# Patient Record
Sex: FEMALE | Race: WHITE | Employment: OTHER | URBAN - METROPOLITAN AREA
[De-identification: names, ages, dates, MRNs, and addresses within clinical notes are randomized per-mention and may not be internally consistent; named-entity substitution may affect disease eponyms.]

---

## 2022-03-22 LAB
INTERNAL QC: NORMAL
SARS-COV-2, NAA: NEGATIVE

## 2022-04-18 ENCOUNTER — TELEPHONE (OUTPATIENT)
Dept: SURGERY | Age: 62
End: 2022-04-18

## 2022-04-18 DIAGNOSIS — R19.01 RUQ ABDOMINAL MASS: Primary | ICD-10-CM

## 2022-04-18 NOTE — TELEPHONE ENCOUNTER
Tell her GBUS looked ok overall. The ultrasound raised question of a lump next to her liver and a CT is needed to further evaluate. I placed this order in Epic. She can get this at [facility] and then follow up in clinic after it has been completed.

## 2022-04-19 DIAGNOSIS — R19.01 RUQ ABDOMINAL MASS: Primary | ICD-10-CM

## 2022-04-19 NOTE — TELEPHONE ENCOUNTER
Authorization is for CT abdomen only. The # is [id number]. It is good until May 18, 2022 at [facility].

## 2022-04-19 NOTE — TELEPHONE ENCOUNTER
I called patient and informed her. I called her insurance company to get prior auth, but they are  [place] time zone and I can not reach them until after 11:00 am, so I will call them back then.

## 2022-04-21 ENCOUNTER — TELEPHONE (OUTPATIENT)
Dept: SURGERY | Age: 62
End: 2022-04-21

## 2022-04-21 DIAGNOSIS — R19.01 RUQ ABDOMINAL MASS: ICD-10-CM

## 2022-04-21 DIAGNOSIS — K63.89 COLONIC MASS: ICD-10-CM

## 2022-04-21 DIAGNOSIS — R10.11 RUQ ABDOMINAL PAIN: Primary | ICD-10-CM

## 2022-04-21 LAB
CREAT SERPL-MCNC: 0.8 MG/DL (ref 0.52–1.04)
GFR CALCULATED: 72

## 2022-04-22 NOTE — TELEPHONE ENCOUNTER
I called patient to explain that CT abdomen shows abnormal colon in RUQ. It was incompletely visualized because her insurance would only approve CT abdomen despite peer to peer that I performed to try and get CT abdomen and pelvis. The patient now needs CT abdomen and pelvis for critical diagnosis that was incompletely visualized. Order placed. I explained the previous insurance denial, our attempt to avoid this very situation and her insurance refusal to approve the appropriate study. Please call her and set up stat CT abdomen and pelvis. Further recommendations once this CT is completed.

## 2022-04-25 ENCOUNTER — TELEPHONE (OUTPATIENT)
Dept: SURGERY | Age: 62
End: 2022-04-25

## 2022-04-25 NOTE — TELEPHONE ENCOUNTER
I spoke with patient. I sent all info to [facility], and still waiting to hear about the prior auth. I will call her as soon as I hear back from [facility].

## 2022-04-25 NOTE — TELEPHONE ENCOUNTER
Pt called in checking the status of her CT scan approval, her cell # [phone], or call her at home. Thanks.

## 2022-04-26 NOTE — TELEPHONE ENCOUNTER
I called [employer] and spoke to Jagruti Chua. She states they denied and closed this request for CT Abd and Pelvis. CPT code 59020. She thinks they might have closed it because they thought it was a duplicate, since she just had one done on 4/19/22. I explained to her that on 4/22/22, I called in to get this new test authorized, and spoke to Jenny Johnston, who told me to fax info to them. I also told her no one called me back or faxed a denial letter. She states it shows that they mailed it to us, so we have not received it yet. She said a Peer to Peer can be done by calling [phone], Ref# [id number]. Patient's [employer] ID # is [id number].

## 2022-04-26 NOTE — TELEPHONE ENCOUNTER
I spoke with peer reviewer. CT abdomen / pelvis is approved. She stated she needed to get new authorization number and would send that this afternoon.

## 2022-04-28 ENCOUNTER — TELEPHONE (OUTPATIENT)
Dept: SURGERY | Age: 62
End: 2022-04-28

## 2022-04-30 NOTE — TELEPHONE ENCOUNTER
I spoke with the patient 4/29/2022 and gave CT results and made referral to Dr. David Ariza. I spoke with him about the case, and his office will contact for her to get colonoscopy ASAP.

## 2022-05-04 ENCOUNTER — TELEPHONE (OUTPATIENT)
Dept: SURGERY | Age: 62
End: 2022-05-04

## 2022-05-04 NOTE — TELEPHONE ENCOUNTER
Ask her to come to [facility] clinic tomorrow to discuss colonoscopy and recent testing and formulate a plan.

## 2022-05-06 ENCOUNTER — ANESTHESIA EVENT (OUTPATIENT)
Dept: OPERATING ROOM | Age: 62
DRG: 326 | End: 2022-05-06
Payer: COMMERCIAL

## 2022-05-06 PROBLEM — C18.9 COLON CANCER (HCC): Status: ACTIVE | Noted: 2022-05-06

## 2022-05-06 RX ORDER — DILTIAZEM HYDROCHLORIDE 180 MG/1
180 CAPSULE, COATED, EXTENDED RELEASE ORAL NIGHTLY
COMMUNITY
End: 2022-09-01

## 2022-05-06 NOTE — PROGRESS NOTES
PRE OP INSTRUCTION SHEET   1. Do not eat or drink anything after 12 midnight  prior to surgery. This includes no water, chewing gum or mints. 2. Take the following pills will a small sip of water (see MAR)                                        3. Aspirin, Ibuprofen, Advil, Naproxen, Vitamin E, fish oil and other Anti-inflammatory products should be stopped for 5 days before surgery or as directed by your physician. 4. Check with your Doctor regarding stopping Plavix, Coumadin, Lovenox, Fragmin or other blood thinners   5. Do not smoke, and do not drink any alcoholic beverages 24 hours prior to surgery. This includes NA Beer. 6. You may brush your teeth and gargle the morning of surgery. DO NOT SWALLOW WATER   7. You MUST make arrangements for a responsible adult to take you home after your surgery. You will not be allowed to leave alone or drive yourself home. It is strongly suggested someone stay with you the first 24 hrs. Your surgery will be cancelled if you do not have a ride home. 8. A parent/legal guardian must accompany a child scheduled for surgery and plan to stay at the hospital until the child is discharged. Please do not bring other children with you. 9. Please wear simple, loose fitting clothing to the hospital.  Emily Yousifs not bring valuables (money, credit cards, checkbooks, etc.) Do not wear any makeup (including no eye makeup) or nail polish on your fingers or toes. 10. DO NOT wear any jewelry or piercings on day of surgery. All body piercing jewelry must be removed. 11. If you have dentures,glasses, or contacts they will be removed before going to the OR; we will provide you a container. 12. Please see your family doctor/and cardiologist for a history & physical and/or concerning medications. Bring any test results/reports from your physician's office. Have history and labs faxed to [phone].  Remember to bring Blood Bank bracelet on the day of surgery. 14. If you have a Living Will and Durable Power of  for Healthcare, please bring in a copy. 13. Notify your Surgeon if you develop any illness between now and surgery  time, cough, cold, fever, sore throat, nausea, vomiting, etc.  Please notify your surgeon if you experience dizziness, shortness of breath or blurred vision between now & the time of your surgery   16. DO NOT shave your operative site 96 hours prior to surgery. For face & neck surgery, men may use an electric razor 48 hours prior to surgery. 17. Shower with _x__Antibacterial soap (x_chlorhexidine for total joint  Pt's) shower two times before surgery.(the morning of and the night before. 18. To provide excellent care visitors will be limited to one in the room at any given time.   Please call pre admission testing if you any further questions [phone] or [phone]

## 2022-05-09 NOTE — ANESTHESIA PRE PROCEDURE
Department of Anesthesiology  Preprocedure Note       Name:  Patty Caraballo   Age:  59 y.o.  :  1960                                          MRN:  [id number]         Date:  2022      Surgeon: Harry Baird):  Veronique Aguirre MD    Procedure: Procedure(s):  RIGHT COLECTOMY    Medications prior to admission:   Prior to Admission medications    Medication Sig Start Date End Date Taking? Authorizing Provider   ESTRADIOL ACETATE PO Take 1 tablet by mouth at bedtime   Yes Historical Provider, MD   dilTIAZem (CARDIZEM CD) 180 MG extended release capsule Take 180 mg by mouth at bedtime   Yes Historical Provider, MD       Current medications:    No current facility-administered medications for this encounter. Current Outpatient Medications   Medication Sig Dispense Refill    ESTRADIOL ACETATE PO Take 1 tablet by mouth at bedtime      dilTIAZem (CARDIZEM CD) 180 MG extended release capsule Take 180 mg by mouth at bedtime         Allergies:     Allergies   Allergen Reactions    Codeine     Penicillins     Sulfa Antibiotics        Problem List:    Patient Active Problem List   Diagnosis Code    Colon cancer ([facility] C18.9       Past Medical History:        Diagnosis Date    [facility]     Hypertension        Past Surgical History:        Procedure Laterality Date    HERNIA REPAIR      TONSILLECTOMY         Social History:    Social History     Tobacco Use    Smoking status: Current Every Day Smoker    Smokeless tobacco: Never Used   Substance Use Topics    Alcohol use: Yes     Comment: seldom                                Ready to quit: Not Answered  Counseling given: Not Answered      Vital Signs (Current):   Vitals:    22 1333   Weight: 97 lb (44 kg)   Height: 5' 3\" (1.6 m)                                              BP Readings from Last 3 Encounters:   No data found for BP       NPO Status:                                                                                 BMI:   Wt Readings from Last 3 Encounters:   05/06/22 97 lb (44 kg)     Body mass index is 17.18 kg/m². CBC: No results found for: WBC, RBC, HGB, HCT, MCV, RDW, PLT    CMP:   Lab Results   Component Value Date    CREATININE 0.8 04/21/2022    LABGLOM 72 04/21/2022       POC Tests: No results for input(s): POCGLU, POCNA, POCK, POCCL, POCBUN, POCHEMO, POCHCT in the last 72 hours. Coags: No results found for: PROTIME, INR, APTT    HCG (If Applicable): No results found for: PREGTESTUR, PREGSERUM, HCG, HCGQUANT     ABGs: No results found for: PHART, PO2ART, RAM7OTJ, VRP0PYO, BEART, I5LPZWLH     Type & Screen (If Applicable):  No results found for: LABABO, LABRH    Drug/Infectious Status (If Applicable):  No results found for: HIV, HEPCAB    COVID-19 Screening (If Applicable):   Lab Results   Component Value Date    COVID19 NEGATIVE 03/22/2022           Anesthesia Evaluation  Patient summary reviewed and Nursing notes reviewed no history of anesthetic complications:   Airway: Mallampati: II     Neck ROM: full   Dental:          Pulmonary:Negative Pulmonary ROS and normal exam                               Cardiovascular:Negative CV ROS    (+) hypertension:,                   Neuro/Psych:   Negative Neuro/Psych ROS              GI/Hepatic/Renal: Neg GI/Hepatic/Renal ROS       (-) hiatal hernia and GERD       Endo/Other: Negative Endo/Other ROS                    Abdominal:             Vascular: Other Findings:             Anesthesia Plan      general     ASA 2     (I discussed with the patient the risks and benefits of PIV, general anesthesia, IV Narcotics, PACU. All questions were answered the patient agrees with the plan and wishes to proceed.  )  Induction: intravenous. Pre-Operative Diagnosis: COLON CANCER    59 y.o.   BMI:  Body mass index is 17.36 kg/m².      Vitals:    05/06/22 1333 05/10/22 0645   BP:  137/70   Pulse:  101   Resp:  16   Temp:  97.3 °F (36.3 °C)   TempSrc:  Temporal   SpO2:  95% Weight: 97 lb (44 kg) 98 lb (44.5 kg)   Height: 5' 3\" (1.6 m) 5' 3\" (1.6 m)       Allergies   Allergen Reactions    Codeine     Penicillins     Sulfa Antibiotics        Social History     Tobacco Use    Smoking status: Current Every Day Smoker     Packs/day: 1.00     Years: 47.00     Pack years: 47.00    Smokeless tobacco: Never Used   Substance Use Topics    Alcohol use: Yes     Comment: seldom       LABS:    CBC  No results found for: WBC, HGB, HCT, PLT  RENAL  Lab Results   Component Value Date/Time    CREATININE 0.8 04/21/2022 06:56 AM     COAGS  No results found for: PROTIME, INR, APTT      Radha Veliz MD   5/9/2022

## 2022-05-10 ENCOUNTER — ANESTHESIA (OUTPATIENT)
Dept: OPERATING ROOM | Age: 62
DRG: 326 | End: 2022-05-10
Payer: COMMERCIAL

## 2022-05-10 ENCOUNTER — HOSPITAL ENCOUNTER (INPATIENT)
Age: 62
LOS: 10 days | Discharge: HOME | DRG: 326 | End: 2022-05-20
Attending: SURGERY | Admitting: SURGERY
Payer: COMMERCIAL

## 2022-05-10 VITALS
SYSTOLIC BLOOD PRESSURE: 128 MMHG | DIASTOLIC BLOOD PRESSURE: 58 MMHG | TEMPERATURE: 97 F | RESPIRATION RATE: 13 BRPM | OXYGEN SATURATION: 100 %

## 2022-05-10 DIAGNOSIS — C18.2 MALIGNANT NEOPLASM OF ASCENDING COLON (HCC): Primary | ICD-10-CM

## 2022-05-10 LAB
ABO/RH: NORMAL
ANION GAP SERPL CALCULATED.3IONS-SCNC: 11 MMOL/L (ref 3–16)
ANTIBODY SCREEN: NORMAL
BUN BLDV-MCNC: 19 MG/DL (ref 7–20)
CALCIUM SERPL-MCNC: 8.7 MG/DL (ref 8.3–10.6)
CHLORIDE BLD-SCNC: 104 MMOL/L (ref 99–110)
CO2: 22 MMOL/L (ref 21–32)
CREAT SERPL-MCNC: 0.7 MG/DL (ref 0.6–1.2)
EKG ATRIAL RATE: 94 BPM
EKG DIAGNOSIS: NORMAL
EKG P AXIS: 85 DEGREES
EKG P-R INTERVAL: 136 MS
EKG Q-T INTERVAL: 370 MS
EKG QRS DURATION: 76 MS
EKG QTC CALCULATION (BAZETT): 462 MS
EKG R AXIS: -62 DEGREES
EKG T AXIS: 74 DEGREES
EKG VENTRICULAR RATE: 94 BPM
GFR AFRICAN AMERICAN: >60
GFR NON-AFRICAN AMERICAN: >60
GLUCOSE BLD-MCNC: 101 MG/DL (ref 70–99)
GLUCOSE BLD-MCNC: 112 MG/DL (ref 70–99)
GLUCOSE BLD-MCNC: 118 MG/DL (ref 70–99)
GLUCOSE BLD-MCNC: 89 MG/DL (ref 70–99)
HCT VFR BLD CALC: 30.3 % (ref 36–48)
HEMOGLOBIN: 9.9 G/DL (ref 12–16)
MCH RBC QN AUTO: 27.6 PG (ref 26–34)
MCHC RBC AUTO-ENTMCNC: 32.6 G/DL (ref 31–36)
MCV RBC AUTO: 84.8 FL (ref 80–100)
PDW BLD-RTO: 15.5 % (ref 12.4–15.4)
PERFORMED ON: ABNORMAL
PLATELET # BLD: 317 K/UL (ref 135–450)
PMV BLD AUTO: 6.9 FL (ref 5–10.5)
POTASSIUM REFLEX MAGNESIUM: 4 MMOL/L (ref 3.5–5.1)
RBC # BLD: 3.57 M/UL (ref 4–5.2)
SODIUM BLD-SCNC: 137 MMOL/L (ref 136–145)
WBC # BLD: 9.7 K/UL (ref 4–11)

## 2022-05-10 PROCEDURE — 6360000002 HC RX W HCPCS

## 2022-05-10 PROCEDURE — 94761 N-INVAS EAR/PLS OXIMETRY MLT: CPT

## 2022-05-10 PROCEDURE — 3700000000 HC ANESTHESIA ATTENDED CARE: Performed by: SURGERY

## 2022-05-10 PROCEDURE — 3600000004 HC SURGERY LEVEL 4 BASE: Performed by: SURGERY

## 2022-05-10 PROCEDURE — 86850 RBC ANTIBODY SCREEN: CPT

## 2022-05-10 PROCEDURE — 2500000003 HC RX 250 WO HCPCS

## 2022-05-10 PROCEDURE — 80048 BASIC METABOLIC PNL TOTAL CA: CPT

## 2022-05-10 PROCEDURE — 6360000002 HC RX W HCPCS: Performed by: SURGERY

## 2022-05-10 PROCEDURE — 3700000001 HC ADD 15 MINUTES (ANESTHESIA): Performed by: SURGERY

## 2022-05-10 PROCEDURE — 44160 REMOVAL OF COLON: CPT | Performed by: SURGERY

## 2022-05-10 PROCEDURE — 7100000000 HC PACU RECOVERY - FIRST 15 MIN: Performed by: SURGERY

## 2022-05-10 PROCEDURE — 88342 IMHCHEM/IMCYTCHM 1ST ANTB: CPT

## 2022-05-10 PROCEDURE — 44015 INSERT NEEDLE CATH BOWEL: CPT | Performed by: SURGERY

## 2022-05-10 PROCEDURE — 86900 BLOOD TYPING SEROLOGIC ABO: CPT

## 2022-05-10 PROCEDURE — 88313 SPECIAL STAINS GROUP 2: CPT

## 2022-05-10 PROCEDURE — 93010 ELECTROCARDIOGRAM REPORT: CPT | Performed by: INTERNAL MEDICINE

## 2022-05-10 PROCEDURE — 44120 REMOVAL OF SMALL INTESTINE: CPT | Performed by: SURGERY

## 2022-05-10 PROCEDURE — 2720000010 HC SURG SUPPLY STERILE: Performed by: SURGERY

## 2022-05-10 PROCEDURE — 7100000001 HC PACU RECOVERY - ADDTL 15 MIN: Performed by: SURGERY

## 2022-05-10 PROCEDURE — 86901 BLOOD TYPING SEROLOGIC RH(D): CPT

## 2022-05-10 PROCEDURE — 43762 RPLC GTUBE NO REVJ TRC: CPT

## 2022-05-10 PROCEDURE — 2500000003 HC RX 250 WO HCPCS: Performed by: SURGERY

## 2022-05-10 PROCEDURE — 47600 CHOLECYSTECTOMY: CPT | Performed by: SURGERY

## 2022-05-10 PROCEDURE — 2580000003 HC RX 258

## 2022-05-10 PROCEDURE — 88309 TISSUE EXAM BY PATHOLOGIST: CPT

## 2022-05-10 PROCEDURE — 94150 VITAL CAPACITY TEST: CPT

## 2022-05-10 PROCEDURE — 2500000003 HC RX 250 WO HCPCS: Performed by: ANESTHESIOLOGY

## 2022-05-10 PROCEDURE — 2709999900 HC NON-CHARGEABLE SUPPLY: Performed by: SURGERY

## 2022-05-10 PROCEDURE — C1892 INTRO/SHEATH,FIXED,PEEL-AWAY: HCPCS | Performed by: SURGERY

## 2022-05-10 PROCEDURE — 2580000003 HC RX 258: Performed by: ANESTHESIOLOGY

## 2022-05-10 PROCEDURE — 36415 COLL VENOUS BLD VENIPUNCTURE: CPT

## 2022-05-10 PROCEDURE — 85027 COMPLETE CBC AUTOMATED: CPT

## 2022-05-10 PROCEDURE — 93005 ELECTROCARDIOGRAM TRACING: CPT | Performed by: ANESTHESIOLOGY

## 2022-05-10 PROCEDURE — 2580000003 HC RX 258: Performed by: SURGERY

## 2022-05-10 PROCEDURE — 88304 TISSUE EXAM BY PATHOLOGIST: CPT

## 2022-05-10 PROCEDURE — 2700000000 HC OXYGEN THERAPY PER DAY

## 2022-05-10 PROCEDURE — 88341 IMHCHEM/IMCYTCHM EA ADD ANTB: CPT

## 2022-05-10 PROCEDURE — 3600000014 HC SURGERY LEVEL 4 ADDTL 15MIN: Performed by: SURGERY

## 2022-05-10 PROCEDURE — C2626 INFUSION PUMP, NON-PROG,TEMP: HCPCS | Performed by: SURGERY

## 2022-05-10 PROCEDURE — A4217 STERILE WATER/SALINE, 500 ML: HCPCS | Performed by: SURGERY

## 2022-05-10 PROCEDURE — 1200000000 HC SEMI PRIVATE

## 2022-05-10 PROCEDURE — 6360000002 HC RX W HCPCS: Performed by: ANESTHESIOLOGY

## 2022-05-10 RX ORDER — SODIUM CHLORIDE 9 MG/ML
INJECTION, SOLUTION INTRAVENOUS PRN
Status: DISCONTINUED | OUTPATIENT
Start: 2022-05-10 | End: 2022-05-10 | Stop reason: HOSPADM

## 2022-05-10 RX ORDER — KETOROLAC TROMETHAMINE 30 MG/ML
INJECTION, SOLUTION INTRAMUSCULAR; INTRAVENOUS PRN
Status: DISCONTINUED | OUTPATIENT
Start: 2022-05-10 | End: 2022-05-10 | Stop reason: SDUPTHER

## 2022-05-10 RX ORDER — ONDANSETRON 2 MG/ML
INJECTION INTRAMUSCULAR; INTRAVENOUS PRN
Status: DISCONTINUED | OUTPATIENT
Start: 2022-05-10 | End: 2022-05-10 | Stop reason: SDUPTHER

## 2022-05-10 RX ORDER — SODIUM CHLORIDE 0.9 % (FLUSH) 0.9 %
5-40 SYRINGE (ML) INJECTION EVERY 12 HOURS SCHEDULED
Status: DISCONTINUED | OUTPATIENT
Start: 2022-05-10 | End: 2022-05-10 | Stop reason: HOSPADM

## 2022-05-10 RX ORDER — FENTANYL CITRATE 50 UG/ML
INJECTION, SOLUTION INTRAMUSCULAR; INTRAVENOUS PRN
Status: DISCONTINUED | OUTPATIENT
Start: 2022-05-10 | End: 2022-05-10 | Stop reason: SDUPTHER

## 2022-05-10 RX ORDER — SODIUM CHLORIDE, SODIUM LACTATE, POTASSIUM CHLORIDE, CALCIUM CHLORIDE 600; 310; 30; 20 MG/100ML; MG/100ML; MG/100ML; MG/100ML
INJECTION, SOLUTION INTRAVENOUS CONTINUOUS PRN
Status: DISCONTINUED | OUTPATIENT
Start: 2022-05-10 | End: 2022-05-10 | Stop reason: SDUPTHER

## 2022-05-10 RX ORDER — LEVOFLOXACIN 5 MG/ML
500 INJECTION, SOLUTION INTRAVENOUS ONCE
Status: COMPLETED | OUTPATIENT
Start: 2022-05-10 | End: 2022-05-10

## 2022-05-10 RX ORDER — ACETAMINOPHEN 650 MG/1
650 SUPPOSITORY RECTAL EVERY 6 HOURS PRN
Status: DISCONTINUED | OUTPATIENT
Start: 2022-05-10 | End: 2022-05-20 | Stop reason: HOSPADM

## 2022-05-10 RX ORDER — SODIUM CHLORIDE, SODIUM LACTATE, POTASSIUM CHLORIDE, CALCIUM CHLORIDE 600; 310; 30; 20 MG/100ML; MG/100ML; MG/100ML; MG/100ML
INJECTION, SOLUTION INTRAVENOUS CONTINUOUS
Status: DISCONTINUED | OUTPATIENT
Start: 2022-05-10 | End: 2022-05-10 | Stop reason: HOSPADM

## 2022-05-10 RX ORDER — SODIUM CHLORIDE 0.9 % (FLUSH) 0.9 %
5-40 SYRINGE (ML) INJECTION PRN
Status: DISCONTINUED | OUTPATIENT
Start: 2022-05-10 | End: 2022-05-10 | Stop reason: HOSPADM

## 2022-05-10 RX ORDER — PROPOFOL 10 MG/ML
INJECTION, EMULSION INTRAVENOUS PRN
Status: DISCONTINUED | OUTPATIENT
Start: 2022-05-10 | End: 2022-05-10 | Stop reason: SDUPTHER

## 2022-05-10 RX ORDER — MAGNESIUM HYDROXIDE 1200 MG/15ML
LIQUID ORAL CONTINUOUS PRN
Status: COMPLETED | OUTPATIENT
Start: 2022-05-10 | End: 2022-05-10

## 2022-05-10 RX ORDER — FAMOTIDINE 10 MG/ML
20 INJECTION, SOLUTION INTRAVENOUS ONCE
Status: COMPLETED | OUTPATIENT
Start: 2022-05-10 | End: 2022-05-10

## 2022-05-10 RX ORDER — LIDOCAINE HYDROCHLORIDE 20 MG/ML
INJECTION, SOLUTION INFILTRATION; PERINEURAL PRN
Status: DISCONTINUED | OUTPATIENT
Start: 2022-05-10 | End: 2022-05-10 | Stop reason: SDUPTHER

## 2022-05-10 RX ORDER — DEXAMETHASONE SODIUM PHOSPHATE 4 MG/ML
INJECTION, SOLUTION INTRA-ARTICULAR; INTRALESIONAL; INTRAMUSCULAR; INTRAVENOUS; SOFT TISSUE PRN
Status: DISCONTINUED | OUTPATIENT
Start: 2022-05-10 | End: 2022-05-10 | Stop reason: SDUPTHER

## 2022-05-10 RX ORDER — ENOXAPARIN SODIUM 100 MG/ML
30 INJECTION SUBCUTANEOUS EVERY 24 HOURS
Status: DISCONTINUED | OUTPATIENT
Start: 2022-05-10 | End: 2022-05-20 | Stop reason: HOSPADM

## 2022-05-10 RX ORDER — ONDANSETRON 2 MG/ML
4 INJECTION INTRAMUSCULAR; INTRAVENOUS
Status: COMPLETED | OUTPATIENT
Start: 2022-05-10 | End: 2022-05-10

## 2022-05-10 RX ORDER — ONDANSETRON 2 MG/ML
INJECTION INTRAMUSCULAR; INTRAVENOUS
Status: COMPLETED
Start: 2022-05-10 | End: 2022-05-10

## 2022-05-10 RX ORDER — ROCURONIUM BROMIDE 10 MG/ML
INJECTION, SOLUTION INTRAVENOUS PRN
Status: DISCONTINUED | OUTPATIENT
Start: 2022-05-10 | End: 2022-05-10 | Stop reason: SDUPTHER

## 2022-05-10 RX ORDER — DIPHENHYDRAMINE HYDROCHLORIDE 50 MG/ML
12.5 INJECTION INTRAMUSCULAR; INTRAVENOUS
Status: DISCONTINUED | OUTPATIENT
Start: 2022-05-10 | End: 2022-05-10 | Stop reason: HOSPADM

## 2022-05-10 RX ORDER — LABETALOL HYDROCHLORIDE 5 MG/ML
5 INJECTION, SOLUTION INTRAVENOUS EVERY 10 MIN PRN
Status: DISCONTINUED | OUTPATIENT
Start: 2022-05-10 | End: 2022-05-10 | Stop reason: HOSPADM

## 2022-05-10 RX ORDER — SODIUM CHLORIDE 9 MG/ML
INJECTION, SOLUTION INTRAVENOUS CONTINUOUS
Status: DISCONTINUED | OUTPATIENT
Start: 2022-05-10 | End: 2022-05-11

## 2022-05-10 RX ORDER — MIDAZOLAM HYDROCHLORIDE 1 MG/ML
INJECTION INTRAMUSCULAR; INTRAVENOUS PRN
Status: DISCONTINUED | OUTPATIENT
Start: 2022-05-10 | End: 2022-05-10 | Stop reason: SDUPTHER

## 2022-05-10 RX ORDER — OXYCODONE HYDROCHLORIDE 5 MG/1
10 TABLET ORAL PRN
Status: DISCONTINUED | OUTPATIENT
Start: 2022-05-10 | End: 2022-05-10 | Stop reason: HOSPADM

## 2022-05-10 RX ORDER — HYDROMORPHONE HCL 110MG/55ML
PATIENT CONTROLLED ANALGESIA SYRINGE INTRAVENOUS PRN
Status: DISCONTINUED | OUTPATIENT
Start: 2022-05-10 | End: 2022-05-10 | Stop reason: SDUPTHER

## 2022-05-10 RX ORDER — BUPIVACAINE HYDROCHLORIDE 5 MG/ML
INJECTION, SOLUTION EPIDURAL; INTRACAUDAL PRN
Status: DISCONTINUED | OUTPATIENT
Start: 2022-05-10 | End: 2022-05-10 | Stop reason: ALTCHOICE

## 2022-05-10 RX ORDER — OXYCODONE HYDROCHLORIDE 5 MG/1
5 TABLET ORAL PRN
Status: DISCONTINUED | OUTPATIENT
Start: 2022-05-10 | End: 2022-05-10 | Stop reason: HOSPADM

## 2022-05-10 RX ADMIN — SUGAMMADEX 200 MG: 100 INJECTION, SOLUTION INTRAVENOUS at 10:44

## 2022-05-10 RX ADMIN — HYDROMORPHONE HYDROCHLORIDE 0.25 MG: 1 INJECTION, SOLUTION INTRAMUSCULAR; INTRAVENOUS; SUBCUTANEOUS at 20:41

## 2022-05-10 RX ADMIN — HYDROMORPHONE HYDROCHLORIDE 0.8 MG: 2 INJECTION, SOLUTION INTRAMUSCULAR; INTRAVENOUS; SUBCUTANEOUS at 10:50

## 2022-05-10 RX ADMIN — BUPIVACAINE HYDROCHLORIDE 270 ML: 5 INJECTION, SOLUTION EPIDURAL; INTRACAUDAL; PERINEURAL at 10:48

## 2022-05-10 RX ADMIN — ENOXAPARIN SODIUM 30 MG: 100 INJECTION SUBCUTANEOUS at 07:26

## 2022-05-10 RX ADMIN — PHENYLEPHRINE HYDROCHLORIDE 100 MCG: 10 INJECTION INTRAVENOUS at 10:23

## 2022-05-10 RX ADMIN — PHENYLEPHRINE HYDROCHLORIDE 50 MCG: 10 INJECTION INTRAVENOUS at 08:00

## 2022-05-10 RX ADMIN — ROCURONIUM BROMIDE 20 MG: 10 INJECTION, SOLUTION INTRAVENOUS at 10:18

## 2022-05-10 RX ADMIN — MIDAZOLAM 2 MG: 1 INJECTION INTRAMUSCULAR; INTRAVENOUS at 07:28

## 2022-05-10 RX ADMIN — HYDROMORPHONE HYDROCHLORIDE 0.5 MG: 1 INJECTION, SOLUTION INTRAMUSCULAR; INTRAVENOUS; SUBCUTANEOUS at 14:22

## 2022-05-10 RX ADMIN — PHENYLEPHRINE HYDROCHLORIDE 50 MCG: 10 INJECTION INTRAVENOUS at 09:23

## 2022-05-10 RX ADMIN — FENTANYL CITRATE 25 MCG: 50 INJECTION INTRAMUSCULAR; INTRAVENOUS at 07:53

## 2022-05-10 RX ADMIN — FENTANYL CITRATE 25 MCG: 50 INJECTION INTRAMUSCULAR; INTRAVENOUS at 08:17

## 2022-05-10 RX ADMIN — ROCURONIUM BROMIDE 30 MG: 10 INJECTION, SOLUTION INTRAVENOUS at 07:36

## 2022-05-10 RX ADMIN — PHENYLEPHRINE HYDROCHLORIDE 50 MCG: 10 INJECTION INTRAVENOUS at 09:42

## 2022-05-10 RX ADMIN — ROCURONIUM BROMIDE 10 MG: 10 INJECTION, SOLUTION INTRAVENOUS at 08:44

## 2022-05-10 RX ADMIN — FENTANYL CITRATE 25 MCG: 50 INJECTION INTRAMUSCULAR; INTRAVENOUS at 07:36

## 2022-05-10 RX ADMIN — FAMOTIDINE 20 MG: 10 INJECTION, SOLUTION INTRAVENOUS at 06:59

## 2022-05-10 RX ADMIN — HYDROMORPHONE HYDROCHLORIDE 0.2 MG: 2 INJECTION, SOLUTION INTRAMUSCULAR; INTRAVENOUS; SUBCUTANEOUS at 09:15

## 2022-05-10 RX ADMIN — HYDROMORPHONE HYDROCHLORIDE 0.5 MG: 1 INJECTION, SOLUTION INTRAMUSCULAR; INTRAVENOUS; SUBCUTANEOUS at 11:51

## 2022-05-10 RX ADMIN — ONDANSETRON HYDROCHLORIDE 4 MG: 2 INJECTION, SOLUTION INTRAMUSCULAR; INTRAVENOUS at 07:53

## 2022-05-10 RX ADMIN — FENTANYL CITRATE 25 MCG: 50 INJECTION INTRAMUSCULAR; INTRAVENOUS at 08:30

## 2022-05-10 RX ADMIN — ROCURONIUM BROMIDE 10 MG: 10 INJECTION, SOLUTION INTRAVENOUS at 09:19

## 2022-05-10 RX ADMIN — ONDANSETRON HYDROCHLORIDE 4 MG: 2 INJECTION, SOLUTION INTRAMUSCULAR; INTRAVENOUS at 11:56

## 2022-05-10 RX ADMIN — SODIUM CHLORIDE, POTASSIUM CHLORIDE, SODIUM LACTATE AND CALCIUM CHLORIDE: 600; 310; 30; 20 INJECTION, SOLUTION INTRAVENOUS at 10:19

## 2022-05-10 RX ADMIN — ROCURONIUM BROMIDE 20 MG: 10 INJECTION, SOLUTION INTRAVENOUS at 07:55

## 2022-05-10 RX ADMIN — HYDROMORPHONE HYDROCHLORIDE 0.25 MG: 1 INJECTION, SOLUTION INTRAMUSCULAR; INTRAVENOUS; SUBCUTANEOUS at 16:29

## 2022-05-10 RX ADMIN — ONDANSETRON HYDROCHLORIDE 4 MG: 2 INJECTION, SOLUTION INTRAMUSCULAR; INTRAVENOUS at 13:19

## 2022-05-10 RX ADMIN — KETOROLAC TROMETHAMINE 30 MG: 30 INJECTION, SOLUTION INTRAMUSCULAR at 10:34

## 2022-05-10 RX ADMIN — DEXAMETHASONE SODIUM PHOSPHATE 4 MG: 4 INJECTION, SOLUTION INTRAMUSCULAR; INTRAVENOUS at 07:53

## 2022-05-10 RX ADMIN — LEVOFLOXACIN 500 MG: 500 INJECTION, SOLUTION INTRAVENOUS at 07:28

## 2022-05-10 RX ADMIN — FAMOTIDINE 20 MG: 10 INJECTION INTRAVENOUS at 20:40

## 2022-05-10 RX ADMIN — SODIUM CHLORIDE, POTASSIUM CHLORIDE, SODIUM LACTATE AND CALCIUM CHLORIDE: 600; 310; 30; 20 INJECTION, SOLUTION INTRAVENOUS at 06:58

## 2022-05-10 RX ADMIN — SODIUM CHLORIDE: 9 INJECTION, SOLUTION INTRAVENOUS at 16:36

## 2022-05-10 RX ADMIN — SODIUM CHLORIDE, POTASSIUM CHLORIDE, SODIUM LACTATE AND CALCIUM CHLORIDE: 600; 310; 30; 20 INJECTION, SOLUTION INTRAVENOUS at 06:29

## 2022-05-10 RX ADMIN — LIDOCAINE HYDROCHLORIDE 60 MG: 20 INJECTION, SOLUTION INFILTRATION; PERINEURAL at 07:35

## 2022-05-10 RX ADMIN — PROPOFOL 100 MG: 10 INJECTION, EMULSION INTRAVENOUS at 07:35

## 2022-05-10 ASSESSMENT — PULMONARY FUNCTION TESTS
PIF_VALUE: 15
PIF_VALUE: 14
PIF_VALUE: 14
PIF_VALUE: 15
PIF_VALUE: 16
PIF_VALUE: 1
PIF_VALUE: 15
PIF_VALUE: 16
PIF_VALUE: 15
PIF_VALUE: 16
PIF_VALUE: 14
PIF_VALUE: 15
PIF_VALUE: 16
PIF_VALUE: 15
PIF_VALUE: 17
PIF_VALUE: 3
PIF_VALUE: 15
PIF_VALUE: 1
PIF_VALUE: 15
PIF_VALUE: 16
PIF_VALUE: 15
PIF_VALUE: 15
PIF_VALUE: 16
PIF_VALUE: 15
PIF_VALUE: 16
PIF_VALUE: 17
PIF_VALUE: 15
PIF_VALUE: 15
PIF_VALUE: 20
PIF_VALUE: 15
PIF_VALUE: 15
PIF_VALUE: 16
PIF_VALUE: 15
PIF_VALUE: 16
PIF_VALUE: 14
PIF_VALUE: 15
PIF_VALUE: 14
PIF_VALUE: 19
PIF_VALUE: 15
PIF_VALUE: 14
PIF_VALUE: 15
PIF_VALUE: 15
PIF_VALUE: 20
PIF_VALUE: 3
PIF_VALUE: 15
PIF_VALUE: 16
PIF_VALUE: 14
PIF_VALUE: 16
PIF_VALUE: 14
PIF_VALUE: 15
PIF_VALUE: 1
PIF_VALUE: 15
PIF_VALUE: 14
PIF_VALUE: 15
PIF_VALUE: 3
PIF_VALUE: 15
PIF_VALUE: 14
PIF_VALUE: 15
PIF_VALUE: 17
PIF_VALUE: 14
PIF_VALUE: 15
PIF_VALUE: 15
PIF_VALUE: 16
PIF_VALUE: 17
PIF_VALUE: 15
PIF_VALUE: 14
PIF_VALUE: 15
PIF_VALUE: 15
PIF_VALUE: 16
PIF_VALUE: 15
PIF_VALUE: 16
PIF_VALUE: 15
PIF_VALUE: 16
PIF_VALUE: 14
PIF_VALUE: 14
PIF_VALUE: 15
PIF_VALUE: 16
PIF_VALUE: 15
PIF_VALUE: 15
PIF_VALUE: 16
PIF_VALUE: 15
PIF_VALUE: 15
PIF_VALUE: 14
PIF_VALUE: 15
PIF_VALUE: 14
PIF_VALUE: 15
PIF_VALUE: 15
PIF_VALUE: 14
PIF_VALUE: 15
PIF_VALUE: 15
PIF_VALUE: 17
PIF_VALUE: 15
PIF_VALUE: 16
PIF_VALUE: 16
PIF_VALUE: 15
PIF_VALUE: 15
PIF_VALUE: 16
PIF_VALUE: 15
PIF_VALUE: 14
PIF_VALUE: 16
PIF_VALUE: 15
PIF_VALUE: 14
PIF_VALUE: 15
PIF_VALUE: 14
PIF_VALUE: 15
PIF_VALUE: 14
PIF_VALUE: 16
PIF_VALUE: 15
PIF_VALUE: 3
PIF_VALUE: 15
PIF_VALUE: 16
PIF_VALUE: 14
PIF_VALUE: 15
PIF_VALUE: 1
PIF_VALUE: 15
PIF_VALUE: 14
PIF_VALUE: 15
PIF_VALUE: 20
PIF_VALUE: 14
PIF_VALUE: 15
PIF_VALUE: 14
PIF_VALUE: 15
PIF_VALUE: 23
PIF_VALUE: 15
PIF_VALUE: 14
PIF_VALUE: 15
PIF_VALUE: 14
PIF_VALUE: 15

## 2022-05-10 ASSESSMENT — PAIN DESCRIPTION - LOCATION
LOCATION: ABDOMEN
LOCATION: ABDOMEN

## 2022-05-10 ASSESSMENT — PAIN DESCRIPTION - DESCRIPTORS
DESCRIPTORS: ACHING

## 2022-05-10 ASSESSMENT — PAIN - FUNCTIONAL ASSESSMENT
PAIN_FUNCTIONAL_ASSESSMENT: PREVENTS OR INTERFERES SOME ACTIVE ACTIVITIES AND ADLS
PAIN_FUNCTIONAL_ASSESSMENT: 0-10

## 2022-05-10 ASSESSMENT — PAIN SCALES - GENERAL
PAINLEVEL_OUTOF10: 7
PAINLEVEL_OUTOF10: 5
PAINLEVEL_OUTOF10: 6
PAINLEVEL_OUTOF10: 7

## 2022-05-10 ASSESSMENT — LIFESTYLE VARIABLES
HOW OFTEN DO YOU HAVE A DRINK CONTAINING ALCOHOL: 2-4 TIMES A MONTH
HOW MANY STANDARD DRINKS CONTAINING ALCOHOL DO YOU HAVE ON A TYPICAL DAY: 1 OR 2

## 2022-05-10 NOTE — PROGRESS NOTES
Patient admitted from OR to PACU. Bedside report received. Patient immediately hooked up to heart monitor. Fernandez King RN

## 2022-05-10 NOTE — PROGRESS NOTES
Patient admitted to room _203_ from Dr. Jimmy Sher. Patient oriented to room, call light, bed rails, phone, lights and bathroom. Patient instructed about the schedule of the day including: vital sign frequency, lab draws, possible tests, frequency of MD and staff rounds, daily weights, I &O's and prescribed diet. Bed alarm deferred patient low fall risk and refuses alarm. Telemetry box in place, patient aware of placement and reason. Bed locked, in lowest position, side rails up 2/4, call light within reach. Recliner Assessment:     Patient is able to demonstrate the ability to move from a reclining position to an upright position within the recliner. 4 Eyes Skin Assessment     The patient is being assess for   Admission    I agree that 2 RN's have performed a thorough Head to Toe Skin Assessment on the patient. ALL assessment sites listed below have been assessed. Areas assessed for pressure by both nurses: Jose Smiles  [x]   Head, Face, and Ears   [x]   Shoulders, Back, and Chest, Abdomen  [x]   Arms, Elbows, and Hands   [x]   Coccyx, Sacrum, and Ischium  [x]   Legs, Feet, and Heels        Skin Assessed Under all Medical Devices by both nurses:  O2 device tubing, russo and NG              All Mepilex Borders were peeled back and area peeked at by both nurses:  No: no mepilex  Please list where Mepilex Borders are located:          patient has a pain ball, GLENN drain on left abdomen sutured on, new J-Tube,  Pain ball connecters indenting under left breast, padding applied. Bilateral feet red with poor circulation with cap refill greater than 3 seconds - not new finding, pt states that her feet have been this way for years. Surgical incision clean dry and intact midline abdomen.     **SHARE this note so that the co-signing nurse is able to place an eSignature**    Co-signer eSignature: Electronically signed by Devi Chicas RN on 5/10/22 at 6:21 PM EDT    Does the Patient have Skin Breakdown related to pressure?   No     (Insert Photo here)         Taqueria Prevention initiated:  No   Wound Care Orders initiated:  No      WOC nurse consulted for Pressure Injury (Stage 3,4, Unstageable, DTI, NWPT, Complex wounds)and New or Established Ostomies:  No      Primary Nurse eSignature: Electronically signed by Abe Wright RN on 5/10/22 at 5:43 PM EDT

## 2022-05-10 NOTE — H&P
Department of General Surgery - Adult  Surgical Service   Attending History and Physical        CHIEF COMPLAINT:  Colon CA      History Obtained From:  patient    HISTORY OF PRESENT ILLNESS:    This patient is a 59 y.o. female who presents with need for colon surgery. Past Medical History:        Diagnosis Date    Cancer ([address]     Hypertension      Past Surgical History:        Procedure Laterality Date    HERNIA REPAIR      TONSILLECTOMY       Current Medications:  Current Facility-Administered Medications   Medication Dose Route Frequency Provider Last Rate Last Admin    lactated ringers infusion   IntraVENous Continuous Jossue Pickett  mL/hr at 05/10/22 0658 New Bag at 05/10/22 0658    sodium chloride flush 0.9 % injection 5-40 mL  5-40 mL IntraVENous 2 times per day Jossue Pikcett MD        sodium chloride flush 0.9 % injection 5-40 mL  5-40 mL IntraVENous PRN Jossue Pickett MD        0.9 % sodium chloride infusion   IntraVENous PRN Jossue Pickett MD        levoFLOXacin (LEVAQUIN) 500 MG/100ML infusion 500 mg  500 mg IntraVENous Once Willian Fleischer, MD        enoxaparin Sodium (LOVENOX) injection 30 mg  30 mg SubCUTAneous Q24H Willian Fleischer, MD        bupivacaine 0.5% (MARCAINE) elastomeric infusion 270 mL  270 mL Infiltration Continuous Willian Fleischer, MD         Home Medications:  Prior to Admission medications    Medication Sig Start Date End Date Taking? Authorizing Provider   ESTRADIOL ACETATE PO Take 1 tablet by mouth at bedtime   Yes Historical Provider, MD   dilTIAZem (CARDIZEM CD) 180 MG extended release capsule Take 180 mg by mouth at bedtime   Yes Historical Provider, MD     Allergies:  Codeine, Penicillins, and Sulfa antibiotics      Social History:   TOBACCO:   reports that she has been smoking. She has a 47.00 pack-year smoking history. She has never used smokeless tobacco.  ETOH:   reports current alcohol use.   Family History:   History reviewed. No pertinent family history. REVIEW OF SYSTEMS:    Patient reports no complaints related to eyes, ears, nose , throat or mouth. No chest pain or SOB. No urinary complaints or musculoskeletal complaints. No skin rashes, bleeding tendencies. Current GI complaints  abdominal pain. PHYSICAL EXAM:    VITALS:  /70   Pulse 101   Temp 97.3 °F (36.3 °C) (Temporal)   Resp 16   Ht 5' 3\" (1.6 m)   Wt 98 lb (44.5 kg)   SpO2 95%   BMI 17.36 kg/m²   Comfortable  Eyes:  No scleral icterus  Mouth:  Mucus membranes moist  Skin:  No rashes  Chest:  CTA  Heart:  Regular  Abdomen:Soft. Extremities:  No edema  Neurologic:  No focal deficits  Psychiatric : AAA O x 3        ASSESSMENT:   Colon CA hepatic flexure    Plan:    The diagnosis and recommended procedure were explained. Questions answered. Prepare for surgery.

## 2022-05-10 NOTE — BRIEF OP NOTE
Brief Postoperative Note      Patient: Patty Caraballo  YOB: 1960  MRN: [id number]    Date of Procedure: 5/10/2022    Pre-Op Diagnosis: COLON CANCER    Post-Op Diagnosis: Same       Procedure(s):  RIGHT COLECTOMY, PARTIAL DUODENECTOMY, SMALL BOWEL RESECTION, CHOLECYSTECTOMY, INSERTION OF FEEDING JEJUNOSTOMY TUBE    Surgeon(s):  Veronique Aguirre MD    Assistant:  Surgical Assistant: Delmon Fothergill Boling    Anesthesia: General    Estimated Blood Loss (mL): 50    Complications: None    Specimens:   ID Type Source Tests Collected by Time Destination   A :  Tissue Colon SURGICAL PATHOLOGY Veronique Aguirre MD 5/10/2022 8172    B :  Tissue Tissue SURGICAL [address] Cornejo MD 5/10/2022 7263        Implants:  * No implants in log *      Drains:   Closed/Suction Drain Right Abdomen Bulb (Active)   Site Description Clean, dry & intact 05/10/22 1027   Dressing Status New dressing applied 05/10/22 1027   Drainage Appearance Bloody 05/10/22 1027   Drain Status To bulb suction 05/10/22 1027       NG/OG/NJ/NE Tube Nasogastric 16 fr Left nostril (Active)   Surrounding Skin Clean, dry & intact 05/10/22 1015   Securement device Tape 05/10/22 1015       Gastrostomy/Enterostomy/Jejunostomy Tube Feeding Jejunostomy LUQ 1 14 fr (Active)   $ Gastrostomy insertion $ Yes 05/10/22 1027   Site Description Clean, dry & intact 05/10/22 1027   J Port Status Other (comment) 05/10/22 1027   Dressing Status New dressing applied 05/10/22 1027   Dressing Type Dry dressing; Other (Comment) 05/10/22 1027       Urinary Catheter Andrade (Active)   $ Urethral catheter insertion Inserted for procedure 05/10/22 0811   Catheter Indications Perioperative use for selected surgical procedures 05/10/22 0811   Urine Color Yellow 05/10/22 0811   Collection Container Standard 05/10/22 0811   Securement Method Securing device (Describe) 05/10/22 0811       Findings: As above    Electronically signed by Dangelo Barrera MD on 5/10/2022 at 11:08 AM

## 2022-05-10 NOTE — ANESTHESIA POSTPROCEDURE EVALUATION
Department of Anesthesiology  Postprocedure Note    Patient: Singh Bond  MRN: [id number]  YOB: 1960  Date of evaluation: 5/10/2022  Time:  2:58 PM     Procedure Summary     Date: 05/10/22 Room / Location: [place] [address] / [facility]    Anesthesia Start: 0730 Anesthesia Stop: 6854    Procedure: RIGHT COLECTOMY, PARTIAL DUODENECTOMY, SMALL BOWEL RESECTION, CHOLECYSTECTOMY, INSERTION OF FEEDING JEJUNOSTOMY TUBE (Right Abdomen) Diagnosis: (COLON CANCER)    Surgeons: Anat Sanchez MD Responsible Provider: Diane Kelley MD    Anesthesia Type: general ASA Status: 2          Anesthesia Type: No value filed. Cruz Phase I: Cruz Score: 9    Cruz Phase II:      Last vitals: Reviewed and per EMR flowsheets.        Anesthesia Post Evaluation    Comments: Postoperative Anesthesia Note    Name:    Singh Bond  MRN:      [id number]    Patient Vitals in the past 12 hrs:  05/10/22 1451, BP:135/75, Temp:96.7 °F (35.9 °C), Temp src:Oral, Pulse:79, Resp:14, SpO2:98 %  05/10/22 1440, BP:(!) 141/60  05/10/22 1435, BP:(!) 141/60, Pulse:80, Resp:14, SpO2:99 %  05/10/22 1430, BP:(!) 142/64, Pulse:79, Resp:13, SpO2:99 %  05/10/22 1425, BP:(!) 142/64, Pulse:80, Resp:12  05/10/22 1420, BP:(!) 142/64, Pulse:79, Resp:18, SpO2:99 %  05/10/22 1415, BP:(!) 151/65, Pulse:81, Resp:19, SpO2:98 %  05/10/22 1410, BP:(!) 151/65, Pulse:80, Resp:23, SpO2:100 %  05/10/22 1405, BP:(!) 151/65, Pulse:77, Resp:21, SpO2:99 %  05/10/22 1400, BP:(!) 141/64, Pulse:77, Resp:17, SpO2:100 %  05/10/22 1355, BP:(!) 141/64, Pulse:75, Resp:18, SpO2:99 %  05/10/22 1350, BP:(!) 141/64, Pulse:77, Resp:16, SpO2:100 %  05/10/22 1345, BP:139/63, Pulse:79, Resp:17, SpO2:98 %  05/10/22 1340, BP:139/63, Pulse:77, Resp:19, SpO2:98 %  05/10/22 1335, BP:139/63, Pulse:74, Resp:16, SpO2:100 %  05/10/22 1330, BP:(!) 141/68, Pulse:75, Resp:16, SpO2:98 %  05/10/22 1325, BP:(!) 141/68, Pulse:78, Resp:14, SpO2:97 %  05/10/22 1320, BP:(!) 141/68, Pulse:80, Resp:13, SpO2:98 %  05/10/22 1315, BP:(!) 141/63, Pulse:75, Resp:19, SpO2:100 %  05/10/22 1310, BP:(!) 141/63, Pulse:74, Resp:16, SpO2:100 %  05/10/22 1305, BP:(!) 141/63, Pulse:74, Resp:17, SpO2:97 %  05/10/22 1300, BP:118/68, Pulse:73, Resp:14, SpO2:99 %  05/10/22 1255, BP:118/68, Pulse:76, Resp:15, SpO2:97 %  05/10/22 1250, BP:118/68, Pulse:74, Resp:15, SpO2:97 %  05/10/22 1245, BP:(!) 136/59, Pulse:73, Resp:12, SpO2:98 %  05/10/22 1240, BP:(!) 136/59, Pulse:73, Resp:17, SpO2:97 %  05/10/22 1235, BP:(!) 136/59, Pulse:79, Resp:12, SpO2:94 %  05/10/22 1225, BP:(!) 132/53, Pulse:78, Resp:12, SpO2:96 %  05/10/22 1220, BP:(!) 132/53, Pulse:83, Resp:13, SpO2:94 %  05/10/22 1215, BP:(!) 122/53, Pulse:77, Resp:18, SpO2:92 %  05/10/22 1200, BP:(!) 134/59, Pulse:79, Resp:20, SpO2:90 %  05/10/22 1145, BP:123/60, Pulse:79, Resp:17, SpO2:95 %  05/10/22 1130, BP:(!) 119/56, Pulse:76, Resp:14, SpO2:93 %  05/10/22 1125, BP:(!) 119/56, Pulse:75, Resp:15, SpO2:92 %  05/10/22 1120, BP:(!) 119/56, Pulse:77, Resp:17, SpO2:90 %  05/10/22 1115, BP:129/61, Pulse:78, Resp:18, SpO2:91 %  05/10/22 1110, BP:(!) 129/58, Pulse:76, Resp:16, SpO2:94 %  05/10/22 1109, Temp:97.3 °F (36.3 °C), Temp src:Temporal  05/10/22 1105, BP:(!) 123/54, Pulse:73, Resp:19, SpO2:99 %  05/10/22 1100, BP:(!) 115/50, Pulse:76, Resp:17, SpO2:99 %  05/10/22 0645, BP:137/70, Temp:97.3 °F (36.3 °C), Temp src:Temporal, Pulse:101, Resp:16, SpO2:95 %, Height:5' 3\" (1.6 m), Weight:98 lb (44.5 kg)     LABS:    CBC  Lab Results       Component                Value               Date/Time                  WBC                      9.7                 05/10/2022 06:55 AM        HGB                      9.9 (L)             05/10/2022 06:55 AM        HCT                      30.3 (L)            05/10/2022 06:55 AM        PLT                      317                 05/10/2022 06:55 AM   RENAL  Lab Results       Component                Value Date/Time                  NA                       137                 05/10/2022 07:20 AM        K                        4.0                 05/10/2022 07:20 AM        CL                       104                 05/10/2022 07:20 AM        CO2                      22                  05/10/2022 07:20 AM        BUN                      19                  05/10/2022 07:20 AM        CREATININE               0.7                 05/10/2022 07:20 AM        GLUCOSE                  89                  05/10/2022 07:20 AM   COAGS  No results found for: PROTIME, INR, APTT    Intake & Output:  @24HRIO@    Nausea & Vomiting:  No    Level of Consciousness:  Awake    Pain Assessment:  Adequate analgesia    Anesthesia Complications:  No apparent anesthetic complications    SUMMARY      Vital signs stable  OK to discharge from Stage I post anesthesia care.   Care transferred from Anesthesiology department on discharge from perioperative area

## 2022-05-11 LAB
ALBUMIN SERPL-MCNC: 2.7 G/DL (ref 3.4–5)
ALP BLD-CCNC: 120 U/L (ref 40–129)
ALT SERPL-CCNC: 15 U/L (ref 10–40)
ANION GAP SERPL CALCULATED.3IONS-SCNC: 10 MMOL/L (ref 3–16)
AST SERPL-CCNC: 27 U/L (ref 15–37)
BASOPHILS ABSOLUTE: 0 K/UL (ref 0–0.2)
BASOPHILS RELATIVE PERCENT: 0.2 %
BILIRUB SERPL-MCNC: 0.3 MG/DL (ref 0–1)
BILIRUBIN DIRECT: <0.2 MG/DL (ref 0–0.3)
BILIRUBIN, INDIRECT: ABNORMAL MG/DL (ref 0–1)
BUN BLDV-MCNC: 17 MG/DL (ref 7–20)
CALCIUM SERPL-MCNC: 8.3 MG/DL (ref 8.3–10.6)
CHLORIDE BLD-SCNC: 105 MMOL/L (ref 99–110)
CO2: 22 MMOL/L (ref 21–32)
CREAT SERPL-MCNC: 0.7 MG/DL (ref 0.6–1.2)
EOSINOPHILS ABSOLUTE: 0 K/UL (ref 0–0.6)
EOSINOPHILS RELATIVE PERCENT: 0 %
GFR AFRICAN AMERICAN: >60
GFR NON-AFRICAN AMERICAN: >60
GLUCOSE BLD-MCNC: 100 MG/DL (ref 70–99)
GLUCOSE BLD-MCNC: 75 MG/DL (ref 70–99)
GLUCOSE BLD-MCNC: 77 MG/DL (ref 70–99)
GLUCOSE BLD-MCNC: 78 MG/DL (ref 70–99)
GLUCOSE BLD-MCNC: 81 MG/DL (ref 70–99)
GLUCOSE BLD-MCNC: 82 MG/DL (ref 70–99)
GLUCOSE BLD-MCNC: 89 MG/DL (ref 70–99)
HCT VFR BLD CALC: 28.6 % (ref 36–48)
HEMOGLOBIN: 9.2 G/DL (ref 12–16)
LYMPHOCYTES ABSOLUTE: 1.2 K/UL (ref 1–5.1)
LYMPHOCYTES RELATIVE PERCENT: 13.9 %
MAGNESIUM: 2 MG/DL (ref 1.8–2.4)
MCH RBC QN AUTO: 27.5 PG (ref 26–34)
MCHC RBC AUTO-ENTMCNC: 32.3 G/DL (ref 31–36)
MCV RBC AUTO: 85.2 FL (ref 80–100)
MONOCYTES ABSOLUTE: 0.5 K/UL (ref 0–1.3)
MONOCYTES RELATIVE PERCENT: 6.4 %
NEUTROPHILS ABSOLUTE: 6.7 K/UL (ref 1.7–7.7)
NEUTROPHILS RELATIVE PERCENT: 79.5 %
PDW BLD-RTO: 15.4 % (ref 12.4–15.4)
PERFORMED ON: ABNORMAL
PERFORMED ON: NORMAL
PHOSPHORUS: 4.2 MG/DL (ref 2.5–4.9)
PLATELET # BLD: 295 K/UL (ref 135–450)
PMV BLD AUTO: 7.2 FL (ref 5–10.5)
POTASSIUM SERPL-SCNC: 4.5 MMOL/L (ref 3.5–5.1)
RBC # BLD: 3.35 M/UL (ref 4–5.2)
SODIUM BLD-SCNC: 137 MMOL/L (ref 136–145)
TOTAL PROTEIN: 5.8 G/DL (ref 6.4–8.2)
WBC # BLD: 8.4 K/UL (ref 4–11)

## 2022-05-11 PROCEDURE — 83735 ASSAY OF MAGNESIUM: CPT

## 2022-05-11 PROCEDURE — 84100 ASSAY OF PHOSPHORUS: CPT

## 2022-05-11 PROCEDURE — 94761 N-INVAS EAR/PLS OXIMETRY MLT: CPT

## 2022-05-11 PROCEDURE — 99024 POSTOP FOLLOW-UP VISIT: CPT | Performed by: NURSE PRACTITIONER

## 2022-05-11 PROCEDURE — 80076 HEPATIC FUNCTION PANEL: CPT

## 2022-05-11 PROCEDURE — 2700000000 HC OXYGEN THERAPY PER DAY

## 2022-05-11 PROCEDURE — 6360000002 HC RX W HCPCS: Performed by: NURSE PRACTITIONER

## 2022-05-11 PROCEDURE — 1200000000 HC SEMI PRIVATE

## 2022-05-11 PROCEDURE — 2580000003 HC RX 258: Performed by: PLASTIC SURGERY

## 2022-05-11 PROCEDURE — 6360000002 HC RX W HCPCS: Performed by: SURGERY

## 2022-05-11 PROCEDURE — 2500000003 HC RX 250 WO HCPCS: Performed by: SURGERY

## 2022-05-11 PROCEDURE — 80048 BASIC METABOLIC PNL TOTAL CA: CPT

## 2022-05-11 PROCEDURE — 2580000003 HC RX 258: Performed by: SURGERY

## 2022-05-11 PROCEDURE — 85025 COMPLETE CBC W/AUTO DIFF WBC: CPT

## 2022-05-11 PROCEDURE — 6360000002 HC RX W HCPCS: Performed by: INTERNAL MEDICINE

## 2022-05-11 PROCEDURE — 36415 COLL VENOUS BLD VENIPUNCTURE: CPT

## 2022-05-11 RX ORDER — DEXTROSE, SODIUM CHLORIDE, SODIUM LACTATE, POTASSIUM CHLORIDE, AND CALCIUM CHLORIDE 5; .6; .31; .03; .02 G/100ML; G/100ML; G/100ML; G/100ML; G/100ML
INJECTION, SOLUTION INTRAVENOUS CONTINUOUS
Status: DISCONTINUED | OUTPATIENT
Start: 2022-05-11 | End: 2022-05-20 | Stop reason: HOSPADM

## 2022-05-11 RX ORDER — DEXTROSE MONOHYDRATE 50 MG/ML
100 INJECTION, SOLUTION INTRAVENOUS PRN
Status: DISCONTINUED | OUTPATIENT
Start: 2022-05-11 | End: 2022-05-20 | Stop reason: HOSPADM

## 2022-05-11 RX ORDER — ONDANSETRON 2 MG/ML
4 INJECTION INTRAMUSCULAR; INTRAVENOUS EVERY 6 HOURS PRN
Status: DISCONTINUED | OUTPATIENT
Start: 2022-05-11 | End: 2022-05-20 | Stop reason: HOSPADM

## 2022-05-11 RX ORDER — DEXTROSE AND SODIUM CHLORIDE 5; .9 G/100ML; G/100ML
INJECTION, SOLUTION INTRAVENOUS CONTINUOUS
Status: DISCONTINUED | OUTPATIENT
Start: 2022-05-11 | End: 2022-05-11

## 2022-05-11 RX ADMIN — HYDROMORPHONE HYDROCHLORIDE 0.5 MG: 1 INJECTION, SOLUTION INTRAMUSCULAR; INTRAVENOUS; SUBCUTANEOUS at 18:40

## 2022-05-11 RX ADMIN — SODIUM CHLORIDE: 9 INJECTION, SOLUTION INTRAVENOUS at 13:55

## 2022-05-11 RX ADMIN — FAMOTIDINE 20 MG: 10 INJECTION INTRAVENOUS at 08:55

## 2022-05-11 RX ADMIN — HYDROMORPHONE HYDROCHLORIDE 0.25 MG: 1 INJECTION, SOLUTION INTRAMUSCULAR; INTRAVENOUS; SUBCUTANEOUS at 22:55

## 2022-05-11 RX ADMIN — ENOXAPARIN SODIUM 30 MG: 100 INJECTION SUBCUTANEOUS at 06:33

## 2022-05-11 RX ADMIN — HYDROMORPHONE HYDROCHLORIDE 0.25 MG: 1 INJECTION, SOLUTION INTRAMUSCULAR; INTRAVENOUS; SUBCUTANEOUS at 08:54

## 2022-05-11 RX ADMIN — HYDROMORPHONE HYDROCHLORIDE 0.25 MG: 1 INJECTION, SOLUTION INTRAMUSCULAR; INTRAVENOUS; SUBCUTANEOUS at 03:44

## 2022-05-11 RX ADMIN — HYDROMORPHONE HYDROCHLORIDE 0.5 MG: 1 INJECTION, SOLUTION INTRAMUSCULAR; INTRAVENOUS; SUBCUTANEOUS at 12:03

## 2022-05-11 RX ADMIN — FAMOTIDINE 20 MG: 10 INJECTION INTRAVENOUS at 20:54

## 2022-05-11 RX ADMIN — SODIUM CHLORIDE, SODIUM LACTATE, POTASSIUM CHLORIDE, CALCIUM CHLORIDE AND DEXTROSE MONOHYDRATE: 5; 600; 310; 30; 20 INJECTION, SOLUTION INTRAVENOUS at 18:58

## 2022-05-11 RX ADMIN — SODIUM CHLORIDE: 9 INJECTION, SOLUTION INTRAVENOUS at 04:08

## 2022-05-11 RX ADMIN — HYDROMORPHONE HYDROCHLORIDE 0.25 MG: 1 INJECTION, SOLUTION INTRAMUSCULAR; INTRAVENOUS; SUBCUTANEOUS at 01:31

## 2022-05-11 ASSESSMENT — PAIN DESCRIPTION - PAIN TYPE: TYPE: SURGICAL PAIN

## 2022-05-11 ASSESSMENT — PAIN DESCRIPTION - ORIENTATION
ORIENTATION: MID

## 2022-05-11 ASSESSMENT — PAIN DESCRIPTION - DESCRIPTORS
DESCRIPTORS: STABBING
DESCRIPTORS: STABBING
DESCRIPTORS: ACHING
DESCRIPTORS: STABBING

## 2022-05-11 ASSESSMENT — PAIN SCALES - GENERAL
PAINLEVEL_OUTOF10: 8
PAINLEVEL_OUTOF10: 5
PAINLEVEL_OUTOF10: 8
PAINLEVEL_OUTOF10: 9
PAINLEVEL_OUTOF10: 5
PAINLEVEL_OUTOF10: 7
PAINLEVEL_OUTOF10: 8
PAINLEVEL_OUTOF10: 10
PAINLEVEL_OUTOF10: 10

## 2022-05-11 ASSESSMENT — PAIN - FUNCTIONAL ASSESSMENT
PAIN_FUNCTIONAL_ASSESSMENT: PREVENTS OR INTERFERES SOME ACTIVE ACTIVITIES AND ADLS

## 2022-05-11 ASSESSMENT — PAIN DESCRIPTION - LOCATION
LOCATION: ABDOMEN

## 2022-05-11 ASSESSMENT — PAIN DESCRIPTION - ONSET: ONSET: GRADUAL

## 2022-05-11 ASSESSMENT — PAIN DESCRIPTION - FREQUENCY: FREQUENCY: CONTINUOUS

## 2022-05-11 NOTE — PLAN OF CARE
Problem: Discharge Planning  Goal: Discharge to home or other facility with appropriate resources  Outcome: Progressing  Flowsheets  Taken 5/10/2022 2057 by Denny Posada RN  Discharge to home or other facility with appropriate resources: Identify barriers to discharge with patient and caregiver  Taken 5/10/2022 1803 by Kathy Brar RN  Discharge to home or other facility with appropriate resources: Refer to discharge planning if patient needs post-hospital services based on physician order or complex needs related to functional status, cognitive ability or social support system     Problem: Pain  Goal: Verbalizes/displays adequate comfort level or baseline comfort level  Outcome: Progressing     Problem: Safety - Adult  Goal: Free from fall injury  Outcome: Progressing     Problem: ABCDS Injury Assessment  Goal: Absence of physical injury  Outcome: Progressing     Problem: Skin/Tissue Integrity  Goal: Absence of new skin breakdown  Description: 1. Monitor for areas of redness and/or skin breakdown  2. Assess vascular access sites hourly  3. Every 4-6 hours minimum:  Change oxygen saturation probe site  4. Every 4-6 hours:  If on nasal continuous positive airway pressure, respiratory therapy assess nares and determine need for appliance change or resting period.   Outcome: Progressing

## 2022-05-11 NOTE — FLOWSHEET NOTE
BP (!) 143/72   Pulse 82   Temp 97.8 °F (36.6 °C) (Oral)   Resp 14   Ht 5' 3\" (1.6 m)   Wt 98 lb (44.5 kg)   SpO2 94%   BMI 17.36 kg/m²     Shift assessment complete; see flowsheets. PM medications administered; see MAR. PRN diluaded given for pain. Pt AOx4 resting in bed. Respirations even and unlabored. Pt denies any further needs or pain at this time. Call light in reach, bed locked in lowest position.

## 2022-05-11 NOTE — CARE COORDINATION
CASE MANAGEMENT INITIAL ASSESSMENT      Reviewed chart and completed assessment with patient: At bedside   Family present:  None   Explained Case Management role/services. Primary contact information:    Health Care Decision Maker :   Primary Decision Maker: Herber Lin - Other - [phone]    Secondary Decision Maker: Melina Silvestre - Other - [phone]        Admit date/status: 05/10/22  Diagnosis:  Colon Cancer  Is this a Readmission?:  No      Insurance: Raffi For 74 required for SNF: Yes       3 night stay required: No    Living arrangements, Adls, care needs, prior to admission: Pt lives in ranch style home with 2 BERNA alone. She states that she is normally independent in all ADLs and active . Pt has extensive support system though friends. She does have [facility] (paperwork scanned into chart) who will transport home at DC. Durable Medical Equipment at home:  None    Services in the home and/or outpatient, prior to admission: None     Current PCP: Aristeo Yanez MD           Prescription coverage? Yes   Will pt require financial assistance with medications No but would like to participate in M2B    Transportation needs:  None      PT/OT recs: Not yet ordered     Hospital Exemption Notification (HEN): Needed for SNF    Barriers to discharge: None Identified     Plan/comments: Pt with goal of returning home upon DC. She does live alone but states that she has a great support system and friends that will stay with her and assist as needed. Pt is agreeable to considering [facility] and understands that CM will assist with TF and home care needs upon DC with no agency preference. Did discuss SNF with pt, she states that she would prefer to not go unless absolutely not possible to go home. CM will follow for Dietary recs on tube feeds and assist as able.       ECOC on chart for MD signature    Lynn Carcamo, RN

## 2022-05-11 NOTE — PROGRESS NOTES
Am Shift assessment completed. Pt is alert and oriented. Vital signs are WNL. Respirations are even & easy. Patient complaint of pain in abdominal area 10/10; patient states current pain medication is giving her no relief; will speak with surgery. Pt denies needs at this time. SR up x 2 and bed in low position. Call light is within reach. Will monitor. .Bedside Mobility Assessment Tool (BMAT):     Assessment Level 1- Sit and Shake    1. From a semi-reclined position, ask patient to sit up and rotate to a seated position at the side of the bed. Can use the bedrail. 2. Ask patient to reach out and grab your hand and shake making sure patient reaches across his/her midline. Pass- Patient is able to come to a seated position, maintain core strength. Maintains seated balance while reaching across midline. Move on to Assessment Level 2. Assessment Level 2- Stretch and Point   1. With patient in seated position at the side of the bed, have patient place both feet on the floor (or stool) with knees no higher than hips. 2. Ask patient to stretch one leg and straighten the knee, then bend the ankle/flex and point the toes. If appropriate, repeat with the other leg. Fail- Patient is unable to complete task. Patient is MOBILITY LEVEL 2. Assessment Level 3- Stand   1. Ask patient to elevate off the bed or chair (seated to standing) using an assistive device (cane, bedrail). 2. Patient should be able to raise buttocks off be and hold for a count of five. May repeat once. Fail- Patient unable to demonstrate standing stability. Patient is MOBILITY LEVEL 3. Assessment Level 4- Walk   1. Ask patient to march in place at bedside. 2. Then ask patient to advance step and return each foot. Some medical conditions may render a patient from stepping backwards, use your best clinical judgement. Fail- Patient not able to complete tasks OR requires use of assistive device. Patient is MOBILITY LEVEL 3. Mobility Level- 1

## 2022-05-11 NOTE — PROGRESS NOTES
I see you are working on this, do you think he can get this done tomorrow or Friday?    Patient report given to Gabino Tinajero. Patient in stable condition. Call light within reach. Family at bedside.

## 2022-05-11 NOTE — PLAN OF CARE
Problem: Discharge Planning  Goal: Discharge to home or other facility with appropriate resources  5/11/2022 1048 by Michela Fischer RN  Outcome: Progressing  5/11/2022 0037 by Subha Hua RN  Outcome: Progressing  Flowsheets  Taken 5/10/2022 2057 by Subha Hua RN  Discharge to home or other facility with appropriate resources: Identify barriers to discharge with patient and caregiver  Taken 5/10/2022 1803 by Tom Loza RN  Discharge to home or other facility with appropriate resources: Refer to discharge planning if patient needs post-hospital services based on physician order or complex needs related to functional status, cognitive ability or social support system     Problem: Pain  Goal: Verbalizes/displays adequate comfort level or baseline comfort level  5/11/2022 1048 by Michela Fischer RN  Outcome: Progressing  5/11/2022 0037 by Subha Hua RN  Outcome: Progressing     Problem: Safety - Adult  Goal: Free from fall injury  5/11/2022 1048 by Michela Fischer RN  Outcome: Progressing  5/11/2022 0037 by Subha Hua RN  Outcome: Progressing     Problem: ABCDS Injury Assessment  Goal: Absence of physical injury  5/11/2022 1048 by Michela Fischer RN  Outcome: Progressing  5/11/2022 0037 by Subha Hua RN  Outcome: Progressing     Problem: Skin/Tissue Integrity  Goal: Absence of new skin breakdown  Description: 1. Monitor for areas of redness and/or skin breakdown  2. Assess vascular access sites hourly  3. Every 4-6 hours minimum:  Change oxygen saturation probe site  4. Every 4-6 hours:  If on nasal continuous positive airway pressure, respiratory therapy assess nares and determine need for appliance change or resting period.   5/11/2022 1048 by Michela Fischer RN  Outcome: Progressing  5/11/2022 0037 by Subha Hua RN  Outcome: Progressing

## 2022-05-11 NOTE — PROGRESS NOTES
Patient moved from bed to chair via 2 asst and walker. Patient tolerated well. Set up in chair for comfort with chair alarm in place. Family at chairside.

## 2022-05-11 NOTE — PROGRESS NOTES
Patient complaint of 8/10 mid abdominal pain; PRN 0.5 dilaudid given per mar. Fresh Ice bag given to patient. Ice chips given per MD. Cori Ingram done at this time; refer to flow sheet. In bed, lowest position, bed alarm in place, rails up x 2.

## 2022-05-11 NOTE — PROGRESS NOTES
General Surgery - Kirsten Osborn, APRN - CNP, CNP  Daily Progress Note    Pt Name: Jackelin Perkins  Medical Record Number: [id number]  Date of Birth 1960   Today's Date: 5/11/2022    ASSESSMENT  1. POD #1 s/p right colectomy, partial duodenectomy, SBO, immanuel, J-tube insertion  2. ABD: soft, + diffuse tenderness, flat, GLENN in place, pain ball in place, a J-tube in place, midline dressing is dry and intact, + N, + frequent belching, no V, no flatus, no BM  3. H&H 9.2/28.6  4. GLENN: 275 serosanguinous  5. VS: HTN, pt is on 2 L NC O2  6. NGT: reconnected, replaced two-way valve: 500 ml out. 7. F/C uo 675 ml  8. Pt states \"I am having so much pain when I breathe. \"     PLAN  1. NPO with NGT to CLWS, may have ice chips  2. Pepcid IVP  3. DVT proph: Lovenox  4. IVF  5. Labs in the am  6. Continue russo for strict I&Os  7. Pain control: increase/pain scale options  8. Up to chair/ambulate as tolerated  9. IS q 1 hour while awake  10. Nutrition consultation for TF recs only  11. Pt is POD #1 and looks good, possible start of trophic TF in the am.     Sara Carroll has slightly improved from yesterday. Pain is not well controlled. She has nausea and no vomiting. She has not passed flatus and has not had a bowel movement. She is somewhat tolerating ice chips. Current activity is up with assistance    OBJECTIVE  VITALS:  height is 5' 3\" (1.6 m) and weight is 98 lb (44.5 kg). Her oral temperature is 97.4 °F (36.3 °C). Her blood pressure is 149/71 (abnormal) and her pulse is 88. Her respiration is 16 and oxygen saturation is 94%.    VITALS:  BP (!) 149/71   Pulse 88   Temp 97.4 °F (36.3 °C) (Oral)   Resp 16   Ht 5' 3\" (1.6 m)   Wt 98 lb (44.5 kg)   SpO2 94%   BMI 17.36 kg/m²   INTAKE/OUTPUT:    Intake/Output Summary (Last 24 hours) at 5/11/2022 1206  Last data filed at 5/11/2022 1120  Gross per 24 hour   Intake 0 ml   Output 820 ml   Net -820 ml     URINARY CATHETER OUTPUT (Russo):     GENERAL: alert, cooperative, no distress  I/O last 3 completed shifts: In: 1500 [I.V.:1500]  Out: 1050 [Urine:675; Drains:275; Blood:100]  I/O this shift:   In: 0   Out: 30 [Drains:30]    LABS  Recent Labs     05/11/22  0541   WBC 8.4   HGB 9.2*   HCT 28.6*         K 4.5      CO2 22   BUN 17   CREATININE 0.7   MG 2.00   PHOS 4.2   CALCIUM 8.3   AST 27   ALT 15   BILITOT 0.3   BILIDIR <0.2     CBC with Differential:    Lab Results   Component Value Date    WBC 8.4 05/11/2022    RBC 3.35 05/11/2022    HGB 9.2 05/11/2022    HCT 28.6 05/11/2022     05/11/2022    MCV 85.2 05/11/2022    MCH 27.5 05/11/2022    MCHC 32.3 05/11/2022    RDW 15.4 05/11/2022    LYMPHOPCT 13.9 05/11/2022    MONOPCT 6.4 05/11/2022    BASOPCT 0.2 05/11/2022    MONOSABS 0.5 05/11/2022    LYMPHSABS 1.2 05/11/2022    EOSABS 0.0 05/11/2022    BASOSABS 0.0 05/11/2022     CMP:    Lab Results   Component Value Date     05/11/2022    K 4.5 05/11/2022    K 4.0 05/10/2022     05/11/2022    CO2 22 05/11/2022    BUN 17 05/11/2022    CREATININE 0.7 05/11/2022    GFRAA >60 05/11/2022    LABGLOM >60 05/11/2022    GLUCOSE 78 05/11/2022    PROT 5.8 05/11/2022    LABALBU 2.7 05/11/2022    CALCIUM 8.3 05/11/2022    BILITOT 0.3 05/11/2022    ALKPHOS 120 05/11/2022    AST 27 05/11/2022    ALT 15 05/11/2022         Kirsten Davalos, APRN - CNP  Electronically signed 5/11/2022 at 11:59 AM

## 2022-05-12 LAB
ANION GAP SERPL CALCULATED.3IONS-SCNC: 8 MMOL/L (ref 3–16)
BASOPHILS ABSOLUTE: 0 K/UL (ref 0–0.2)
BASOPHILS RELATIVE PERCENT: 0.2 %
BUN BLDV-MCNC: 9 MG/DL (ref 7–20)
CALCIUM SERPL-MCNC: 8 MG/DL (ref 8.3–10.6)
CHLORIDE BLD-SCNC: 101 MMOL/L (ref 99–110)
CO2: 26 MMOL/L (ref 21–32)
CREAT SERPL-MCNC: <0.5 MG/DL (ref 0.6–1.2)
EOSINOPHILS ABSOLUTE: 0 K/UL (ref 0–0.6)
EOSINOPHILS RELATIVE PERCENT: 0.4 %
GFR AFRICAN AMERICAN: >60
GFR NON-AFRICAN AMERICAN: >60
GLUCOSE BLD-MCNC: 104 MG/DL (ref 70–99)
GLUCOSE BLD-MCNC: 109 MG/DL (ref 70–99)
GLUCOSE BLD-MCNC: 119 MG/DL (ref 70–99)
GLUCOSE BLD-MCNC: 122 MG/DL (ref 70–99)
GLUCOSE BLD-MCNC: 122 MG/DL (ref 70–99)
GLUCOSE BLD-MCNC: 97 MG/DL (ref 70–99)
HCT VFR BLD CALC: 25.8 % (ref 36–48)
HEMOGLOBIN: 8.3 G/DL (ref 12–16)
LYMPHOCYTES ABSOLUTE: 1 K/UL (ref 1–5.1)
LYMPHOCYTES RELATIVE PERCENT: 8.4 %
MAGNESIUM: 2 MG/DL (ref 1.8–2.4)
MCH RBC QN AUTO: 27 PG (ref 26–34)
MCHC RBC AUTO-ENTMCNC: 32.2 G/DL (ref 31–36)
MCV RBC AUTO: 83.9 FL (ref 80–100)
MONOCYTES ABSOLUTE: 0.6 K/UL (ref 0–1.3)
MONOCYTES RELATIVE PERCENT: 5.2 %
NEUTROPHILS ABSOLUTE: 9.8 K/UL (ref 1.7–7.7)
NEUTROPHILS RELATIVE PERCENT: 85.8 %
PDW BLD-RTO: 15.2 % (ref 12.4–15.4)
PERFORMED ON: ABNORMAL
PERFORMED ON: NORMAL
PHOSPHORUS: 2 MG/DL (ref 2.5–4.9)
PLATELET # BLD: 286 K/UL (ref 135–450)
PMV BLD AUTO: 6.8 FL (ref 5–10.5)
POTASSIUM SERPL-SCNC: 4.3 MMOL/L (ref 3.5–5.1)
RBC # BLD: 3.08 M/UL (ref 4–5.2)
SODIUM BLD-SCNC: 135 MMOL/L (ref 136–145)
WBC # BLD: 11.4 K/UL (ref 4–11)

## 2022-05-12 PROCEDURE — 2700000000 HC OXYGEN THERAPY PER DAY

## 2022-05-12 PROCEDURE — 0DHA3UZ INSERTION OF FEEDING DEVICE INTO JEJUNUM, PERCUTANEOUS APPROACH: ICD-10-PCS | Performed by: UROLOGY

## 2022-05-12 PROCEDURE — 0FT40ZZ RESECTION OF GALLBLADDER, OPEN APPROACH: ICD-10-PCS | Performed by: UROLOGY

## 2022-05-12 PROCEDURE — 2580000003 HC RX 258: Performed by: SURGERY

## 2022-05-12 PROCEDURE — 0DTF0ZZ RESECTION OF RIGHT LARGE INTESTINE, OPEN APPROACH: ICD-10-PCS | Performed by: UROLOGY

## 2022-05-12 PROCEDURE — 80048 BASIC METABOLIC PNL TOTAL CA: CPT

## 2022-05-12 PROCEDURE — 85025 COMPLETE CBC W/AUTO DIFF WBC: CPT

## 2022-05-12 PROCEDURE — 97166 OT EVAL MOD COMPLEX 45 MIN: CPT

## 2022-05-12 PROCEDURE — 2580000003 HC RX 258: Performed by: PLASTIC SURGERY

## 2022-05-12 PROCEDURE — 97530 THERAPEUTIC ACTIVITIES: CPT

## 2022-05-12 PROCEDURE — 1200000000 HC SEMI PRIVATE

## 2022-05-12 PROCEDURE — 2500000003 HC RX 250 WO HCPCS: Performed by: NURSE PRACTITIONER

## 2022-05-12 PROCEDURE — 0DB90ZZ EXCISION OF DUODENUM, OPEN APPROACH: ICD-10-PCS | Performed by: UROLOGY

## 2022-05-12 PROCEDURE — 83735 ASSAY OF MAGNESIUM: CPT

## 2022-05-12 PROCEDURE — 99024 POSTOP FOLLOW-UP VISIT: CPT | Performed by: NURSE PRACTITIONER

## 2022-05-12 PROCEDURE — 6360000002 HC RX W HCPCS: Performed by: SURGERY

## 2022-05-12 PROCEDURE — 84100 ASSAY OF PHOSPHORUS: CPT

## 2022-05-12 PROCEDURE — 2580000003 HC RX 258: Performed by: NURSE PRACTITIONER

## 2022-05-12 PROCEDURE — 0DT80ZZ RESECTION OF SMALL INTESTINE, OPEN APPROACH: ICD-10-PCS | Performed by: UROLOGY

## 2022-05-12 PROCEDURE — 94761 N-INVAS EAR/PLS OXIMETRY MLT: CPT

## 2022-05-12 PROCEDURE — 97535 SELF CARE MNGMENT TRAINING: CPT

## 2022-05-12 PROCEDURE — 6360000002 HC RX W HCPCS: Performed by: NURSE PRACTITIONER

## 2022-05-12 PROCEDURE — 97162 PT EVAL MOD COMPLEX 30 MIN: CPT

## 2022-05-12 PROCEDURE — 36415 COLL VENOUS BLD VENIPUNCTURE: CPT

## 2022-05-12 PROCEDURE — 2500000003 HC RX 250 WO HCPCS: Performed by: SURGERY

## 2022-05-12 PROCEDURE — 97116 GAIT TRAINING THERAPY: CPT

## 2022-05-12 RX ADMIN — HYDROMORPHONE HYDROCHLORIDE 0.5 MG: 1 INJECTION, SOLUTION INTRAMUSCULAR; INTRAVENOUS; SUBCUTANEOUS at 04:45

## 2022-05-12 RX ADMIN — ENOXAPARIN SODIUM 30 MG: 100 INJECTION SUBCUTANEOUS at 05:49

## 2022-05-12 RX ADMIN — HYDROMORPHONE HYDROCHLORIDE 0.5 MG: 1 INJECTION, SOLUTION INTRAMUSCULAR; INTRAVENOUS; SUBCUTANEOUS at 16:56

## 2022-05-12 RX ADMIN — FAMOTIDINE 20 MG: 10 INJECTION INTRAVENOUS at 10:09

## 2022-05-12 RX ADMIN — SODIUM CHLORIDE, SODIUM LACTATE, POTASSIUM CHLORIDE, CALCIUM CHLORIDE AND DEXTROSE MONOHYDRATE: 5; 600; 310; 30; 20 INJECTION, SOLUTION INTRAVENOUS at 04:57

## 2022-05-12 RX ADMIN — FAMOTIDINE 20 MG: 10 INJECTION INTRAVENOUS at 21:15

## 2022-05-12 RX ADMIN — HYDROMORPHONE HYDROCHLORIDE 0.5 MG: 1 INJECTION, SOLUTION INTRAMUSCULAR; INTRAVENOUS; SUBCUTANEOUS at 10:09

## 2022-05-12 RX ADMIN — HYDROMORPHONE HYDROCHLORIDE 0.5 MG: 1 INJECTION, SOLUTION INTRAMUSCULAR; INTRAVENOUS; SUBCUTANEOUS at 22:53

## 2022-05-12 RX ADMIN — SODIUM PHOSPHATE, MONOBASIC, MONOHYDRATE 15 MMOL: 276; 142 INJECTION, SOLUTION INTRAVENOUS at 13:03

## 2022-05-12 ASSESSMENT — PAIN SCALES - GENERAL
PAINLEVEL_OUTOF10: 5
PAINLEVEL_OUTOF10: 8
PAINLEVEL_OUTOF10: 7
PAINLEVEL_OUTOF10: 4
PAINLEVEL_OUTOF10: 6
PAINLEVEL_OUTOF10: 6

## 2022-05-12 NOTE — PROGRESS NOTES
General Surgery - Kirsten Chiu, APRN - CNP, CNP  Daily Progress Note    Pt Name: Miky Salas  Medical Record Number: [id number]  Date of Birth 1960   Today's Date: 5/12/2022    ASSESSMENT  1. POD #2 s/p right colectomy, partial duodenectomy, SBO, immanuel, J-tube insertion  2. ABD: soft, + diffuse tenderness, flat, GLENN in place, pain ball in place, a J-tube in place, midline dressing  Removed: staples look good, no N, no V, no flatus, no BM  3. H&H 9.2/28.6->8.3/25.8  4. Phos 2  5. GLENN: 80 serosanguinous  6. VS: BP and HR normal, pt is on 2 L NC O2  7. NGT: 1.5 L out  8. F/C: 1.1L out   9. Pt states \"I am having so much pain when I breathe. \"     PLAN  1. NPO with NGT to CLWS, may have ice chips  2. Start trophic TF today via J-tube: discussed with dietary   3. Pepcid IVP  4. Na+ phos  5. DVT proph: Lovenox  6. IVF  7. Labs in the am  8. D/c russo but continue strict I&Os  9. Pain control  10. Up to chair/ambulate as tolerated  11. IS q 1 hour while awake  12. Pt is POD #2 and looks good, Start trophic TF via her J-tube today, Pt looks good and was able to get up in a chair: PT/OT, ambulate, d/c russo. Awaiting return of bowel function. Andrea Bishop has improved from yesterday. Pain is better controlled. She has nausea and no vomiting. She has not passed flatus and has not had a bowel movement. She is tolerating ice chips with her NGT in place. Current activity is up with assistance    OBJECTIVE  VITALS:  height is 5' 3\" (1.6 m) and weight is 98 lb (44.5 kg). Her oral temperature is 97 °F (36.1 °C). Her blood pressure is 134/68 and her pulse is 83. Her respiration is 16 and oxygen saturation is 98%.    VITALS:  /68   Pulse 83   Temp 97 °F (36.1 °C) (Oral)   Resp 16   Ht 5' 3\" (1.6 m)   Wt 98 lb (44.5 kg)   SpO2 98%   BMI 17.36 kg/m²   INTAKE/OUTPUT:      Intake/Output Summary (Last 24 hours) at 5/12/2022 1411  Last data filed at 5/12/2022 1200  Gross per 24 hour   Intake 0 ml Output 3025 ml   Net -3025 ml     URINARY CATHETER OUTPUT (Andrade):     GENERAL: alert, cooperative, no distress  I/O last 3 completed shifts: In: 0   Out: 3345 [Urine:1675; Emesis/NG output:1500; Drains:170]  I/O this shift:  In: -   Out: 300 [Urine:300]    LABS  Recent Labs     05/11/22  0541 05/11/22  0541 05/12/22  0519 05/12/22  0520   WBC 8.4   < >  --  11.4*   HGB 9.2*   < >  --  8.3*   HCT 28.6*   < >  --  25.8*      < >  --  286      < > 135*  --    K 4.5   < > 4.3  --       < > 101  --    CO2 22   < > 26  --    BUN 17   < > 9  --    CREATININE 0.7   < > <0.5*  --    MG 2.00   < > 2.00  --    PHOS 4.2   < > 2.0*  --    CALCIUM 8.3   < > 8.0*  --    AST 27  --   --   --    ALT 15  --   --   --    BILITOT 0.3  --   --   --    BILIDIR <0.2  --   --   --     < > = values in this interval not displayed.      CBC with Differential:    Lab Results   Component Value Date    WBC 11.4 05/12/2022    RBC 3.08 05/12/2022    HGB 8.3 05/12/2022    HCT 25.8 05/12/2022     05/12/2022    MCV 83.9 05/12/2022    MCH 27.0 05/12/2022    MCHC 32.2 05/12/2022    RDW 15.2 05/12/2022    LYMPHOPCT 8.4 05/12/2022    MONOPCT 5.2 05/12/2022    BASOPCT 0.2 05/12/2022    MONOSABS 0.6 05/12/2022    LYMPHSABS 1.0 05/12/2022    EOSABS 0.0 05/12/2022    BASOSABS 0.0 05/12/2022     CMP:    Lab Results   Component Value Date     05/12/2022    K 4.3 05/12/2022    K 4.0 05/10/2022     05/12/2022    CO2 26 05/12/2022    BUN 9 05/12/2022    CREATININE <0.5 05/12/2022    GFRAA >60 05/12/2022    LABGLOM >60 05/12/2022    GLUCOSE 109 05/12/2022    PROT 5.8 05/11/2022    LABALBU 2.7 05/11/2022    CALCIUM 8.0 05/12/2022    BILITOT 0.3 05/11/2022    ALKPHOS 120 05/11/2022    AST 27 05/11/2022    ALT 15 05/11/2022         Kirsten Chiu, APRN - CNP  Electronically signed 5/12/2022 at 2:11 PM

## 2022-05-12 NOTE — FLOWSHEET NOTE
05/12/22 1200   Peripheral IV 05/10/22 Right;Posterior Hand   Placement Date/Time: 05/10/22 0658   Size: 20 g  Orientation: Right;Posterior  Location: Hand  Hand hygiene completed: Yes  Inserted by: Kenny rn  Insertion attempts: 1   Site Assessment Leaking   Phlebitis Assessment Pain at access site with erythema and/or edema   Infiltration Assessment 1       Patient called out with complaints of swelling and pain in hand. Writer found that IV was infiltrated. Therefore it was removed.

## 2022-05-12 NOTE — FLOWSHEET NOTE
BP (!) 151/79   Pulse 86   Temp 97.2 °F (36.2 °C) (Oral)   Resp 16   Ht 5' 3\" (1.6 m)   Wt 98 lb (44.5 kg)   SpO2 95%   BMI 17.36 kg/m²     Shift assessment complete; see flowsheets. PM medications administered; see MAR. Pt AOx4 resting in bed. Respirations even and unlabored. Midline dressing clean, dry, and intact. Pt denies any further needs or pain at this time. Call light in reach, bed locked in lowest position.

## 2022-05-12 NOTE — PROGRESS NOTES
supine in bed with visitor present. Pt agreeable to this OT eval & tx. Upper Extremity ROM:    WFL    Upper Extremity Strength:    WFL    Upper Extremity Sensation    WFL    Upper Extremity Proprioception:  WFL    Coordination and Tone  WFL    Balance  Functional Sitting Balance:  WFL  Functional Standing Balance:Diminished    Bed mobility:    Supine to sit:   SBA  Sit to supine:   Not Tested  Rolling:    Not Tested  Scooting in sitting:  SBA  Scooting to head of bed:   Not Tested    Bridging:   Not Tested    Transfers:    Sit to stand:  CGA  Stand to sit:  CGA  Bed to chair:   CGA with no AD   Standard toilet: Not Tested  Bed to [facility]:  Not Tested    Dressing:      UE:   Not Tested  LE:    Supervision-IND to don and doff socks     Bathing:    UE:  Not Tested  LE:  Not Tested    Eating:   Not Tested- Pt on ice chips only     Toileting:  Not Tested    Activity Tolerance   Pt completed therapy session decreased balance   Pt completed therapy session with No adverse symptoms noted w/activity   SpO2: 97% on 2L at rest in Supine, dropped to 89% after taking O2 off to blow nose.     HR: 105bpm  SpO2: 97%  Positioning Needs:   Pt up in chair, alarm set, positioned in proper neutral alignment and pressure relief provided. Exercise / Activities Initiated:   N/A    Patient/Family Education:   Role of OT    Assessment of Deficits: Pt seen for Occupational therapy evaluation in acute care setting. Pt demonstrated decreased Activity tolerance, ADLs, Balance , Bed mobility and Transfers. Pt functioning below baseline and will likely benefit from skilled occupational therapy services to maximize safety and independence. Goal(s) : To be met in 3 Visits:  1). Bed to toilet/BSC: Supervision    To be met in 5 Visits:  1). Supine to/from Sit:  Independent  2). Upper Body Bathing:   Independent  3). Lower Body Bathing:   Supervision  4). Upper Body Dressing:  Independent  5). Lower Body Dressing:  Independent  6).  Pt to demonstrate UE exs x 15 reps with minimal cues    Rehabilitation Potential:  Fair for goals listed above. Strengths for achieving goals include: Pt cooperative  Barriers to achieving goals include:  Complexity of condition     Plan: To be seen 3-5 x/wk while in acute care setting for therapeutic exercises, bed mobility, transfers, dressing, bathing, family/patient education, ADL/IADL retraining, energy conservation training.      Ivory Pollock OTR/L [id number]          If patient discharges from this facility prior to next visit, this note will serve as the Discharge Summary

## 2022-05-12 NOTE — CONSULTS
Pharmacy to dose Sodium Phosphate. Na+ = 135;  PO4 = 2. Give Sodium Phosphate 15 mMol IVPB over 4 hours x 1.   LINDA RichardsPh.5/12/202211:18 AM

## 2022-05-12 NOTE — PROGRESS NOTES
Comprehensive Nutrition Assessment    Type and Reason for Visit:  Initial,Consult (consult for TF recommendations only - trophic TF)    Nutrition Recommendations/Plan:   1. Initiate EN today - ADULT TUBE FEEDING; J-tube; Standard with Fiber formula - Jevity 1.5 with a trophic rate of 15 ml/hr x 20 hours + water flushes of 30 ml every 4 hours for tube patency + administer one proteinex P2Go once daily via feeding tube. 2. Monitor TF start, rate, intake, and tolerance + water flushes + administration of one proteinex P2Go once daily. 3. Monitor GI status/function and plan of care. 4. Please obtain an actual, current weight for this patient - only a stated weight was obtained upon admission (this RD zeroed out patient's bed this afternoon so that weight could be obtained when she gets back into bed). 5. Monitor nutrition-related labs and weight trends. Malnutrition Assessment:  Malnutrition Status:   At risk for malnutrition (05/12/22 1432)    Context:  Acute Illness     Findings of the 6 clinical characteristics of malnutrition:  Energy Intake:  Mild decrease in energy intake (x 2 days admission)  Weight Loss:  Unable to assess (admission weight is a stated weight)     Body Fat Loss:  No significant body fat loss     Muscle Mass Loss:  No significant muscle mass loss    Fluid Accumulation:  No significant fluid accumulation     Strength:  Not Performed    Nutrition Assessment:    patient is nutritionally compromised AEB hx of colon cancer with need for colon surgery on 5/9/22 and NPO status and she is at risk for further compromise d/t NPO status, need for j-tube feedings, patient is s/p R colectomy/partial duodenectomy/small bowel resection/cholecystectomy/insertion of j-tube, and altered nutrition-related labs; will start trophic TF today, per gely from Kirsten (surgery CNP)    Nutrition Related Findings:    patient is alert and oriented; abdomen is soft, flat, tender, and bowel sounds are hypoactive; + closed drain in R abdomen; patient is s/p R colectomy/partial duodenectomy/small bowel resection/cholecystectomy/insertion of j-tube on 5/10/22; BLE + 1 pitting edema noted; mandy hong (surgery CNP), to start trophic TF today - started this afternoon; patient has a cough and reports pain in her abdomen when coughing; patient reported that she caught a cold PTA and she is on the tail-end of it at this time; patient is from home alone and she is able to do her grocery shopping + prepare meals at home on her own; patient has her own teeth intact and no difficulties chewing and/or swallowing; patient reported that her bowel regimen was \"like clockwork\" until Feb. 2022 (around Nichole's Day) when she started having diarrhea for ~ 12 days; patient reported that she was eating normally PTA but most things \"went right through\" ~ 3 hours after consuming them; UBW is 120#, per patient; TF had not been started as of yet when this RD performed initial assessment this afternoon Wound Type: Surgical Incision (+ abdominal incision)       Current Nutrition Intake & Therapies:    Average Meal Intake: NPO  Average Supplements Intake: NPO  Diet NPO  ADULT TUBE FEEDING; Jejunostomy; Standard with Fiber; Continuous; 15; No; 30; Q 4 hours; Protein; one proteinex P2Go once daily via feeding tube  Current Tube Feeding (TF) Orders:  · Feeding Route: Jejunostomy  · Formula: Standard with Fiber  · Schedule: Continuous  · Feeding Regimen: Jevity 1.5 with a trophic rate of 15 ml/hr x 20 hours  · Additives/Modulars: Protein (one proteinex P2Go once daily via feeding tube)  · Water Flushes: water flushes of 30 ml every 4 hours for tube patency  · Current TF & Flush Orders Provides: TF to possibly be started today  · Goal TF & Flush Orders Provides: Jevity 1.5 with a goal rate of 40 ml/hr x 20 hours = 800 ml TV, 1200 kcals, 51 g protein, and 608 ml free water + 26 g protein and 104 kcals from one proteinex P2Go once daily via feeding tube (77 g protein and 1304 kcals total) + 30 ml water flushes every 4 hours for tube patency      Anthropometric Measures:  Height: 5' 3\" (160 cm)  Ideal Body Weight (IBW): 115 lbs (52 kg)    Admission Body Weight: 98 lb (44.5 kg) (obtained on 5/10/22; stated weight)  Current Body Weight: 98 lb (44.5 kg) (obtained on 5/10/22; stated weight), 85.2 % IBW.  Weight Source: Stated  Current BMI (kg/m2): 17.4  Usual Body Weight: 97 lb (44 kg) (obtained on 5/6/22; unknown weight method)  % Weight Change (Calculated): 1  Weight Adjustment For: No Adjustment                 BMI Categories: Underweight (BMI less than 18.5)    Estimated Daily Nutrient Needs:  Energy Requirements Based On: Kcal/kg  Weight Used for Energy Requirements: Current (based on a stated weight of 98#)  Energy (kcal/day): 1350 - 1440 kcals based on 30-32 kcals/kg/CBW  Weight Used for Protein Requirements: Current  Protein (g/day): 68 - 77 g protein based on 1.5-1.7 g/kg/CBW  Method Used for Fluid Requirements: 1 ml/kcal  Fluid (ml/day): 1350 - 1440 ml    Nutrition Diagnosis:   · Inadequate oral intake related to inadequate protein-energy intake,altered GI function,altered GI structure,increase demand for energy/nutrients as evidenced by NPO or clear liquid status due to medical condition,poor intake prior to admission,BMI,lab values,GI abnormality,other (comment) (hx of colon cancer with surgery on 5/10/22)      Nutrition Interventions:   Food and/or Nutrient Delivery: Continue NPO,Start Tube Feeding  Nutrition Education/Counseling: No recommendation at this time  Coordination of Nutrition Care: Continue to monitor while inpatient,Coordination of Care  Plan of Care discussed with: Kirsten (surgery CNP)    Goals:  Previous Goal Met:  (N/A)  Goals: Initiate nutrition support       Nutrition Monitoring and Evaluation:   Behavioral-Environmental Outcomes: None Identified  Food/Nutrient Intake Outcomes: Enteral Nutrition Intake/Tolerance,IVF Intake  Physical Signs/Symptoms Outcomes: Biochemical Data,GI Status,Hemodynamic Status,Nutrition Focused Physical Findings,Skin,Weight    Discharge Planning:     Too soon to determine     Chrissy March, [address], [place]  Contact: [phone]

## 2022-05-12 NOTE — CARE COORDINATION
Received referral from [facility] to assist with arranging new TF at discharge. Pt lives outside of [facility] service area. Liaison to assist with benefit verification when TF recs available. Noted RD TF recommendation:TF recommendations - ADULT TUBE FEEDING; J-tube; Standard with Fiber formula - Jevity 1.5 with a trophic rate of 15 ml/hr x 20 hours + 30 ml water flushes every 4 hours for tube patency. Please administer one proteinex P2Go once daily via feeding tube. Telephone call to Rigoberto Delatorre with [facility] [phone]. Request for benefit verification. Received coverage information from [facility]. Pt is covered at 100% for TF. Bkstock is able to come to hospital prior to discharge for some teaching. Please notify him to schedule. Unable to arrange [facility] agency due to no plan for dc. LM for CM with above coverage information.       Electronically signed by Eveline Padilla RN on 5/12/2022 at 12:01 PM

## 2022-05-12 NOTE — PLAN OF CARE
Problem: Discharge Planning  Goal: Discharge to home or other facility with appropriate resources  5/12/2022 0008 by Barbara Pak RN  Outcome: Progressing  Flowsheets (Taken 5/11/2022 2101)  Discharge to home or other facility with appropriate resources: Identify barriers to discharge with patient and caregiver  5/11/2022 1048 by Manuela Liu RN  Outcome: Progressing     Problem: Pain  Goal: Verbalizes/displays adequate comfort level or baseline comfort level  5/12/2022 0008 by Barbara Pak RN  Outcome: Progressing  5/11/2022 1048 by Manuela Liu RN  Outcome: Progressing     Problem: Safety - Adult  Goal: Free from fall injury  5/12/2022 0008 by Barbara Pak RN  Outcome: Progressing  Flowsheets (Taken 5/12/2022 0007)  Free From Fall Injury: Instruct family/caregiver on patient safety  5/11/2022 1048 by Manuela Liu RN  Outcome: Progressing     Problem: ABCDS Injury Assessment  Goal: Absence of physical injury  5/12/2022 0008 by Barbara Pak RN  Outcome: Progressing  5/11/2022 1048 by Manuela Liu RN  Outcome: Progressing     Problem: Skin/Tissue Integrity  Goal: Absence of new skin breakdown  Description: 1. Monitor for areas of redness and/or skin breakdown  2. Assess vascular access sites hourly  3. Every 4-6 hours minimum:  Change oxygen saturation probe site  4. Every 4-6 hours:  If on nasal continuous positive airway pressure, respiratory therapy assess nares and determine need for appliance change or resting period.   5/12/2022 0008 by Barbara Pak RN  Outcome: Progressing  5/11/2022 1048 by Manuela Liu RN  Outcome: Progressing

## 2022-05-12 NOTE — CARE COORDINATION
INTERDISCIPLINARY PLAN OF CARE CONFERENCE    Date/Time: 5/12/2022 11:53 AM  Completed by: Phyllis Batista RN, Case Management      Patient Name:  Dannie Stubbs  YOB: 1960  Admitting Diagnosis: Colon cancer [facility]) [C18.9]     Admit Date/Time:  5/10/2022  6:10 AM    Chart reviewed. Interdisciplinary team contacted or reviewed plan related to patient progress and discharge plans. Disciplines included Case Management, Nursing, and Dietitian. Current Status: Stable  PT/OT recommendation for discharge plan of care: N/A    Expected D/C Disposition:  Home  Confirmed plan with patient  Yes   Met with: patient  Discharge Plan Comments:  Reviewed chart and met with pt at bedside. Plan remains for home. Attempted to discuss [facility] and home T. F. but pt states awaiting Biopsy. Spoke with Jeffy Dozier at [facility] who will follow and check benefits for home T. F. when appropriate.  Pt is currently NPO     Home O2 in place on admit: No  Pt informed of need to bring portable home O2 tank on day of discharge for nursing to connect prior to leaving:  Not Indicated  Verbalized agreement/Understanding:  Not Indicated

## 2022-05-12 NOTE — OP NOTE
[address]                 [address] [facility], [place]                                OPERATIVE REPORT    PATIENT NAME: Deena Long                     :        1960  MED REC NO:   [id number]                          ROOM:       0203  ACCOUNT NO:   [de-identified]                           ADMIT DATE: 05/10/2022  PROVIDER:     Margarito Craft MD    DATE OF PROCEDURE:  05/10/2022    LOCATION:  [facility]. OPERATION PERFORMED:  1. Right colectomy. 2.  Small bowel resection. 3.  Partial duodenectomy. 4.  Cholecystectomy. 5.  Insertion of a 14-Pashto red rubber catheter feeding jejunostomy  tube. PREOPERATIVE DIAGNOSIS:  Locally advanced colon cancer. POSTOPERATIVE DIAGNOSIS:  Locally advanced colon cancer. SURGEON:  Margarito Craft MD    ANESTHESIA:  General.    COMPLICATIONS:  None. ESTIMATED BLOOD LOSS:  100 mL. INDICATIONS FOR THE OPERATION:  The patient is a 49-year-old female who  had vague right upper quadrant pain with associated weight loss. Her  workup which included imaging and a colonoscopy diagnosed a large tumor  in the hepatic flexure area. The risks and benefits of operative  intervention were explained. The patient understood them, accepted  them, and elected to proceed. DESCRIPTION OF OPERATION:  The patient was brought to the operating  room. General anesthesia was induced. She was prepped and draped in  the usual surgical sterile fashion. Midline incision was made. The  peritoneal cavity was entered. The liver had no visible or palpable  abnormalities. There was a bulky mass as expected in the right upper  quadrant. I began to take down some of the lateral attachments of the  right colon. I then had to separate the mass from the inferior edge of  the liver and gallbladder. There was local extension into the  gallbladder.   The gallbladder was subsequently removed by  it  from the liver bed and isolating the usha structures where we secured  the cystic duct and cystic artery with silk sutures. There was an  implant on the gallbladder which was a local extension from the mass in  the right upper quadrant. This was labeled with a suture. I then  continued to dissect into the retroperitoneum. The right ureter was  identified and protected. I then isolated the first portion of the  duodenum. I was able to visualize the more distal portions of the  duodenum. There was local extension into the duodenum. I used a  contour stapler to do a partial duodenectomy. This portion of the  duodenum that was resected was removed as part of an en bloc specimen. The anastomosis in the duodenum was oversewn with silk sutures. NG tube  had been placed and was in good position in the stomach. There was a  loop of small bowel that was also adherent to this tumor mass. A WILMA-75  was used to divide the bowel on either side of this. This loop of small  bowel also was sent as part of the subsequent en bloc specimen. The  WILMA-75 was also used to divide the terminal ileum as well as the  transverse colon. I then took down all the mesenteric attachments with  a combination of silk suture ligatures and the LigaSure device. Specimen was passed off as an en bloc specimen. Hemostasis was  confirmed. The small bowel was reapproximated in a side-to-side fashion  with a reload of the WILMA-75 stapler. A TX stapler was used to close the  end hole. Staple lines were oversewn with silk sutures. The terminal  ileum was aligned to the transverse colon and a side-to-side, but  functional end-to-end anastomosis was created there with a reload of the  WILMA-75 stapler. The end-hole there was also closed with a TX stapler  and staple lines oversewn with silk sutures including mesenteric defect. Hemostasis was overall obtained.   Because of the duodenal resection, I  elected to place a feeding jejunostomy tube downstream.  A place on the  proximal small bowel was identified and 2-0 silk pursestring was placed. An enterotomy was made. A 14-Indonesian red rubber catheter was advanced in  an antegrade fashion. The end had been cut off the catheter. Some  extra side holes had been created. The pursestring was secured. A  Witzel tunnel was then created with 3-0 silk sutures. The tube was  brought out in the left mid abdomen. The bowel was secured up to the  anterior abdominal wall in four quadrants around the catheter. At this  point, we had completed two areas of small bowel resection which  included an adherent loop of small bowel as well as local extension into  the duodenum. The gallbladder had been removed and the en bloc specimen  was passed off. A drain was placed in the right upper quadrant. A  dual-lumen On-Q PainBuster system was placed on both sides of the  incision. The midline fascia was closed with #1 Prolene starting  superiorly and inferiorly and tying in the middle. Skin clips were used  to reapproximate the skin. A dressing was placed. DISPOSITION:  The patient tolerated the procedure without any acute  complication.         Corinne Cruz MD    D: 05/12/2022 7:22:43       T: 05/12/2022 7:27:42     [email or web address]  Job#: [id number]     Doc#: [id number]    CC:  Rob Rand MD

## 2022-05-12 NOTE — PROGRESS NOTES
Inpatient Physical Therapy Evaluation and Treatment    Unit: [address] [facility]  Date:  5/12/2022  Patient Name:    Carlos Mitchell  Admitting diagnosis:  Colon cancer [facility]) [C18.9]  Admit Date:  5/10/2022  Precautions/Restrictions/WB Status/ Lines/ Wounds/ Oxygen: Fall risk, Bed/chair alarm, Lines -IV, Supplemental O2 (2L), NG tube, Drains (GLENN) and PEG and pain ball    Treatment Time:  14:05 -14:45  Treatment Number:  1   Timed Code Treatment Minutes: 30 minutes  Total Treatment Minutes:  40 minutes    Patient Goals for Therapy: To go home         Discharge Recommendations: Home 24 hr assist initially followed by assist prn  DME needs for discharge: Needs Met       Therapy recommendation for EMS Transport: can transport by wheelchair    Therapy recommendations for staff:   Stand by assist with use of gait belt for all transfers and ambulation to/from chair  to/from bathroom  within room  within halls    History of Present Illness:   Per Dr Fam Fus:  S/p RIGHT COLECTOMY, PARTIAL DUODENECTOMY, SMALL BOWEL RESECTION, CHOLECYSTECTOMY, INSERTION OF FEEDING JEJUNOSTOMY TUBE on 5/10/22    Home Health S4 Level Recommendation:  NA  AM-PAC Mobility Score       AM-PAC Inpatient Mobility without Stair Climbing Raw Score : 18    Preadmission Environment    Pt. Lives Alone  Home environment:  one story home  Steps to enter first floor: 2 steps to enter  Steps to second floor: N/A   Bathroom: tub/shower unit and standard height commode  Equipment owned: none   Pets: 2 outdoor dogs and 1 indoor dog    Preadmission Status:  Pt. Able to drive: Yes  Pt Fully independent with ADLs: Yes  Pt. Required assistance from family for: Independent PTA, occasional assistance with feeding outdoor dogs prn. Pt. independent for transfers and gait and walked with No Device  History of falls No   Has family and friends that can provide 24/7 assist prn.     Pain   Yes - when coughing or va abdominals  Location: abdominal region 2/2 surgical incision Ratin /10 /c coughing/va abdominals, 3/10 at rest  Pain Medicine Status: Received pain med prior to tx     Cognition    A&O x4   Able to follow 2 step commands    Subjective  Patient lying supine in bed with visitor present. Pt agreeable to this PT eval & tx. Upper Extremity ROM/Strength  Please see OT evaluation. Lower Extremity ROM / Strength   AROM WFL: Yes  ROM limitations: NA    Strength Assessment (measured on a 0-5 scale):  R LE   Quad   4+   Ant Tib  5   Hamstring 4+   Iliopsoas Deferred 2/2 abdominal surgery  L LE  Quad   4+   Ant Tib  5   Hamstring 4+   Iliopsoas Deferred  2/2 abdominal surgery    Lower Extremity Sensation    WFL    Lower Extremity Proprioception:   NT    Coordination and Tone  NT    Balance  Sitting:  Good ; Supervision  Comments: did not require use of UE for incr MILAN    Standing: Good - ; SBA  Comments: no overt LOB, narrow MILAN    Bed Mobility   Supine to Sit:    Supervision  Sit to Supine:   Not Tested  Rolling:   Not Tested  Scooting in sitting: Supervision  Scooting in supine:  Not Tested    Transfer Training     Sit to stand:   SBA  Stand to sit:   SBA  Bed to Chair:   SBA with use of gait belt    Gait gait completed as indicated below  Distance:      25 ft + 80 ft + 80 ft + 25 ft  Deviations (firm surface/linoleum):  decreased kath, narrow MILAN and step through pattern  Assistive Device Used:    No AD and gait belt  Level of Assist:    CGA  Comment: minimal SOB experienced, ~2min standing rest break in middle of gait training, no overt LOB. Stair Training deferred, pt does not have stairs in home environment  # of Steps:   N/A  Level of Assist:  N/A  UE Support:  NA  Assistive Device:  N/A  Pattern:   N/A  Comments: NA    Activity Tolerance   Pt completed therapy session with No adverse symptoms noted w/activity   SpO2: 97% on 2L at rest in Supine, dropped to 89% after taking O2 off to blow nose.     HR: 105bpm  SpO2: 97%    Positioning Needs   Pt up in chair, alarm set, positioned in proper neutral alignment and pressure relief provided. Call light provided and all needs within reach   RN aware of pt requesting ice pack. Exercises Initiated  Teri deferred secondary to treatment focus on functional mobility  NA    Other  None. Patient/Family Education   Pt educated on role of inpatient PT, POC, importance of continued activity, DC recommendations, safety awareness and calling for assist with mobility. Assessment  Pt seen for Physical Therapy evaluation in acute care setting. Pt demonstrated decreased Activity tolerance and Balance as well as decreased independence with Ambulation and Transfers. Recommending Home 24 hr assist upon discharge as patient functioning close to baseline level    Goals : To be met in 3 visits:  1). Independent with LE Ex x 10 reps    To be met in 6 visits:  1). Supine to/from sit: Independent  2). Sit to/from stand: Supervision  3). Bed to chair: Supervision  4). Gait: Ambulate 150 ft.  with  Supervision and use of No AD  5). Tolerate B LE exercises 3 sets of 10-15 reps  6). Ascend/descend 2 steps /s handrail /c CGA     Rehabilitation Potential: Good  Strengths for achieving goals include:   Pt motivated, PLOF, Family Support and Pt cooperative   Barriers to achieving goals include:    No Barriers    Plan    To be seen 3-5 x / week  while in acute care setting for therapeutic exercises, bed mobility, transfers, progressive gait training, balance training, and family/patient education. Signature: KARISHMA Burger, PT, DPT, OMT-C  #[id number]      If patient discharges from this facility prior to next visit, this note will serve as the Discharge Summary.

## 2022-05-12 NOTE — FLOWSHEET NOTE
05/12/22 1715   Gastrostomy/Enterostomy/Jejunostomy Tube Feeding Jejunostomy LUQ 1 14 fr   Placement Date/Time: 05/10/22 1013   Present on Admission/Arrival: No  Inserted by: DR. Imelda Martinez. JUVE  Type: Feeding Jejunostomy  Location: LUQ  Tube Number: 1  Tube Size (fr): 14 fr   Tube Feeding Standard with Fiber   Tube feeding/verify rate (mL/hr) 15 mL/hr   Tube Feeding Supplement (Product) Protein Modular   Free Water/Flush (mL) 30 mL       Feeds started.  Patient tolerated well

## 2022-05-12 NOTE — FLOWSHEET NOTE
05/12/22 1000   Vital Signs   Temp 96.7 °F (35.9 °C)   Temp Source Oral   Pulse 82   Heart Rate Source Monitor   Resp 16   /67   BP Location Right upper arm   MAP (Calculated) 90.33   Patient Position Semi fowlers   Level of Consciousness Alert (0)   MEWS Score 1   Pain Assessment   Pain Assessment 0-10   Pain Level 6   Oxygen Therapy   SpO2 96 %   O2 Device Nasal cannula   O2 Flow Rate (L/min) 2 L/min       Shift assessment complete. See flow sheet. Scheduled meds given. See MAR. Patients head-toe complete, VS are logged, and active bowel sound noted in all four quadrants. Patient rating pain 7/10 PRN pain medication given. Midline incision covered. However minial drainage noted. NG hooked to CWS with dark output. J tube is clamped at this time. GLENN drain emptied with a total of 120 mL bloody output. Andrade draining clear urine. Patient continues to be on 2L O2. No further needs  noted at this time. Call light and bedside table are within reach. The bed is locked and is in the lowest position. Jaki Us RN

## 2022-05-13 LAB
ANION GAP SERPL CALCULATED.3IONS-SCNC: 9 MMOL/L (ref 3–16)
BASOPHILS ABSOLUTE: 0 K/UL (ref 0–0.2)
BASOPHILS RELATIVE PERCENT: 0.4 %
BUN BLDV-MCNC: 9 MG/DL (ref 7–20)
CALCIUM SERPL-MCNC: 8.3 MG/DL (ref 8.3–10.6)
CHLORIDE BLD-SCNC: 100 MMOL/L (ref 99–110)
CO2: 27 MMOL/L (ref 21–32)
CREAT SERPL-MCNC: <0.5 MG/DL (ref 0.6–1.2)
EOSINOPHILS ABSOLUTE: 0.1 K/UL (ref 0–0.6)
EOSINOPHILS RELATIVE PERCENT: 0.9 %
GFR AFRICAN AMERICAN: >60
GFR NON-AFRICAN AMERICAN: >60
GLUCOSE BLD-MCNC: 100 MG/DL (ref 70–99)
GLUCOSE BLD-MCNC: 101 MG/DL (ref 70–99)
GLUCOSE BLD-MCNC: 104 MG/DL (ref 70–99)
GLUCOSE BLD-MCNC: 110 MG/DL (ref 70–99)
GLUCOSE BLD-MCNC: 110 MG/DL (ref 70–99)
GLUCOSE BLD-MCNC: 113 MG/DL (ref 70–99)
GLUCOSE BLD-MCNC: 117 MG/DL (ref 70–99)
HCT VFR BLD CALC: 26.7 % (ref 36–48)
HEMOGLOBIN: 8.7 G/DL (ref 12–16)
LYMPHOCYTES ABSOLUTE: 1 K/UL (ref 1–5.1)
LYMPHOCYTES RELATIVE PERCENT: 10.6 %
MAGNESIUM: 2.2 MG/DL (ref 1.8–2.4)
MCH RBC QN AUTO: 27.5 PG (ref 26–34)
MCHC RBC AUTO-ENTMCNC: 32.5 G/DL (ref 31–36)
MCV RBC AUTO: 84.5 FL (ref 80–100)
MONOCYTES ABSOLUTE: 0.5 K/UL (ref 0–1.3)
MONOCYTES RELATIVE PERCENT: 4.9 %
NEUTROPHILS ABSOLUTE: 8.2 K/UL (ref 1.7–7.7)
NEUTROPHILS RELATIVE PERCENT: 83.2 %
PDW BLD-RTO: 15.4 % (ref 12.4–15.4)
PERFORMED ON: ABNORMAL
PHOSPHORUS: 2.9 MG/DL (ref 2.5–4.9)
PLATELET # BLD: 265 K/UL (ref 135–450)
PMV BLD AUTO: 6.6 FL (ref 5–10.5)
POTASSIUM SERPL-SCNC: 3.8 MMOL/L (ref 3.5–5.1)
RBC # BLD: 3.16 M/UL (ref 4–5.2)
SODIUM BLD-SCNC: 136 MMOL/L (ref 136–145)
WBC # BLD: 9.9 K/UL (ref 4–11)

## 2022-05-13 PROCEDURE — 83735 ASSAY OF MAGNESIUM: CPT

## 2022-05-13 PROCEDURE — 2500000003 HC RX 250 WO HCPCS: Performed by: SURGERY

## 2022-05-13 PROCEDURE — 51798 US URINE CAPACITY MEASURE: CPT | Performed by: NURSE PRACTITIONER

## 2022-05-13 PROCEDURE — 80048 BASIC METABOLIC PNL TOTAL CA: CPT

## 2022-05-13 PROCEDURE — 85025 COMPLETE CBC W/AUTO DIFF WBC: CPT

## 2022-05-13 PROCEDURE — 84100 ASSAY OF PHOSPHORUS: CPT

## 2022-05-13 PROCEDURE — 6360000002 HC RX W HCPCS: Performed by: SURGERY

## 2022-05-13 PROCEDURE — 94761 N-INVAS EAR/PLS OXIMETRY MLT: CPT

## 2022-05-13 PROCEDURE — 97530 THERAPEUTIC ACTIVITIES: CPT

## 2022-05-13 PROCEDURE — 97535 SELF CARE MNGMENT TRAINING: CPT

## 2022-05-13 PROCEDURE — 2580000003 HC RX 258: Performed by: SURGERY

## 2022-05-13 PROCEDURE — 2700000000 HC OXYGEN THERAPY PER DAY

## 2022-05-13 PROCEDURE — 36415 COLL VENOUS BLD VENIPUNCTURE: CPT

## 2022-05-13 PROCEDURE — 99024 POSTOP FOLLOW-UP VISIT: CPT | Performed by: NURSE PRACTITIONER

## 2022-05-13 PROCEDURE — 1200000000 HC SEMI PRIVATE

## 2022-05-13 PROCEDURE — 6360000002 HC RX W HCPCS: Performed by: NURSE PRACTITIONER

## 2022-05-13 RX ADMIN — FAMOTIDINE 20 MG: 10 INJECTION INTRAVENOUS at 20:00

## 2022-05-13 RX ADMIN — HYDROMORPHONE HYDROCHLORIDE 0.5 MG: 1 INJECTION, SOLUTION INTRAMUSCULAR; INTRAVENOUS; SUBCUTANEOUS at 22:15

## 2022-05-13 RX ADMIN — HYDROMORPHONE HYDROCHLORIDE 0.5 MG: 1 INJECTION, SOLUTION INTRAMUSCULAR; INTRAVENOUS; SUBCUTANEOUS at 05:55

## 2022-05-13 RX ADMIN — HYDROMORPHONE HYDROCHLORIDE 0.5 MG: 1 INJECTION, SOLUTION INTRAMUSCULAR; INTRAVENOUS; SUBCUTANEOUS at 18:37

## 2022-05-13 RX ADMIN — FAMOTIDINE 20 MG: 10 INJECTION INTRAVENOUS at 09:07

## 2022-05-13 RX ADMIN — ENOXAPARIN SODIUM 30 MG: 100 INJECTION SUBCUTANEOUS at 05:32

## 2022-05-13 RX ADMIN — HYDROMORPHONE HYDROCHLORIDE 0.5 MG: 1 INJECTION, SOLUTION INTRAMUSCULAR; INTRAVENOUS; SUBCUTANEOUS at 09:07

## 2022-05-13 RX ADMIN — HYDROMORPHONE HYDROCHLORIDE 0.5 MG: 1 INJECTION, SOLUTION INTRAMUSCULAR; INTRAVENOUS; SUBCUTANEOUS at 14:14

## 2022-05-13 ASSESSMENT — PAIN SCALES - GENERAL
PAINLEVEL_OUTOF10: 9
PAINLEVEL_OUTOF10: 9
PAINLEVEL_OUTOF10: 8
PAINLEVEL_OUTOF10: 7
PAINLEVEL_OUTOF10: 9
PAINLEVEL_OUTOF10: 7
PAINLEVEL_OUTOF10: 8
PAINLEVEL_OUTOF10: 8

## 2022-05-13 ASSESSMENT — PAIN DESCRIPTION - LOCATION
LOCATION: ABDOMEN

## 2022-05-13 NOTE — PROGRESS NOTES
Occupational Therapy Daily Treatment Note    Unit: [facility]  Date:  2022  Patient Name:    Miky Salas  Admitting diagnosis:  Colon cancer [facility]) [C18.9]  Admit Date:  5/10/2022  Precautions/Restrictions:  fall risk, IV, bed/chair alarm, supplemental O2 (1L) and abdominal incision, drain, NG tube, PEG        Discharge Recommendations: Home with 24/7 assist and home therapy  DME needs for discharge: Needs Met       Therapy recommendations for staff:   Assist of 1 (stand by assist) with use of No AD for all ambulation within halls    AM-PAC Score: AM-PAC Inpatient Daily Activity Raw Score: 21  Home Health S4 Level: Level 1- Standard       Treatment Time:  [id number]  Treatment number:  2    Total Treatment Time:   40 minutes    History of Present Illness: Pt admitted for colon surgery for cancer    Subjective:  Pt agreeable to transfer, toileting,  and mobility in hallway    Pain   Yes  Ratin  Location:surgery site  Pain Medicine Status: No request made      Bed Mobility:   Supine to Sit:  SBA  Sit to Supine:  Not Tested  Rolling:           Not Tested  Scooting:        SBA    Transfer Training:   Sit to stand:   SBA  Stand to sit:  SBA  Bed to Chair:  SBA  Bed to BSC:   SBA  Standard toilet:   Not Tested    Activity Tolerance   Pt completed therapy session with SOB noted with mobility  SpO2: 90% on 1L o2 after mobility in hallway  HR:   BP:     Balance  Sitting Balance :     Independent  Standing Balance   SBA    ADL Training:   Upper body dressing:  Not Tested  Upper body bathing:  Not Tested  Lower body dressing:  SBA pants   Lower body bathing:  Not Tested  Toileting:   SBA  Grooming/Hygiene:  SBA  Feeding :   Not Tested    Therapeutic Exercise:   N/A    Patient Education:   Role of OT  Recommendations for DC  Energy conservation techniques    Positioning Needs:   Up in chair, call light and needs in reach.       Family Present:  No,  Friend present for session    Assessment: Pt agreeable to therapy for toileting, dressing, transfers, and mobility. Pt educated on transfer techniques to reduce pain at abdominal incision. Pt performed functional transfer with SBA without AD. Pt will benefit from continued acute OT and therapy at home    GOALS  To be met in 3 Visits:  1). Bed to toilet/BSC: Supervision     To be met in 5 Visits:  1). Supine to/from Sit:             Independent  2). Upper Body Bathing:         Independent  3). Lower Body Bathing:         Supervision  4). Upper Body Dressing:       Independent  5). Lower Body Dressing:       Independent  6).  Pt to demonstrate UE exs x 15 reps with minimal cues      Plan: cont with POC      Hope Steel, OTR/L [id number]        If patient discharges from this facility prior to next visit, this note will serve as the Discharge Summary

## 2022-05-13 NOTE — PROGRESS NOTES
General Surgery - Kirsten Laguna, APRN - CNP, CNP  Daily Progress Note    Pt Name: Deirdre Davies  Medical Record Number: [id number]  Date of Birth 1960   Today's Date: 5/13/2022    ASSESSMENT  1. POD #3 s/p right colectomy, partial duodenectomy, SBO, immanuel, J-tube insertion  2. ABD: soft, + diffuse tenderness, flat, GLENN in place, pain ball: leaking and removed, a J-tube in place, midline staples look good, no N, no V, c/o \"some abdominal cramping,\" no flatus, no BM  3. H&H stable 9.2/28.6->8.3/25.8->8.7/26.7  4. Phos 2->2.9  5. GLENN: 80 serosanguinous  6. VS: BP and HR normal, pt is on 2 L NC O2  7. NGT: 1 L out  8. F/C removed yesterday: UO good  9. Pt states \"I am so sore today but, I walked the halls, got up in the chair and am doing my breathing thing. \"     PLAN  1. NPO with NGT to CLWS, may have ice chips  2. TF: rate at 20 ml/hr, getting water flush and protein shot (Goal Jevity 1.5 with a goal rate of 40 ml/hr x 20 hours, with 1 proteinex per day and 30 ml water flushes every 4 hours)  3. Pepcid IVP  4. DVT proph: Lovenox  5. PT/OT  6. IVF decrease rate  7. Labs in the am  8. Ccontinue strict I&Os  9. Pain control  10. Up to chair/ambulate as tolerated  11. IS q 1 hour while awake  12. Pt is POD #3 and looks good, TF via her J-tube. Awaiting return of bowel function. Will discuss with Dr. [place] timing of UGI ? Monday given right colectomy and SBR if pt having bowel function. Katja Wright has improved from yesterday. Pain is better controlled. She has nausea and no vomiting. She has not passed flatus and has not had a bowel movement. She is tolerating TF via her J-tube. Current activity is up with assistance    OBJECTIVE  VITALS:  height is 5' 3\" (1.6 m) and weight is 98 lb (44.5 kg). Her oral temperature is 97 °F (36.1 °C). Her blood pressure is 140/67 (abnormal) and her pulse is 79. Her respiration is 16 and oxygen saturation is 99%.    VITALS:  BP (!) 140/67   Pulse 79   Temp 97 °F (36.1 °C) (Oral)   Resp 16   Ht 5' 3\" (1.6 m)   Wt 98 lb (44.5 kg)   SpO2 99%   BMI 17.36 kg/m²   INTAKE/OUTPUT:      Intake/Output Summary (Last 24 hours) at 5/13/2022 1116  Last data filed at 5/13/2022 6500  Gross per 24 hour   Intake 301 ml   Output 2205 ml   Net -1904 ml     URINARY CATHETER OUTPUT (Andrade):     GENERAL: alert, cooperative, no distress  I/O last 3 completed shifts: In: 301 [NG/GT:301]  Out: 3790 [Urine:1650; Emesis/NG output:1900; Drains:240]  I/O this shift:  In: -   Out: 140 [Urine:100; Drains:40]    LABS  Recent Labs     05/11/22  0541 05/12/22  0519 05/13/22  0943   WBC 8.4   < > 9.9   HGB 9.2*   < > 8.7*   HCT 28.6*   < > 26.7*      < > 265      < > 136   K 4.5   < > 3.8      < > 100   CO2 22   < > 27   BUN 17   < > 9   CREATININE 0.7   < > <0.5*   MG 2.00   < > 2.20   PHOS 4.2   < > 2.9   CALCIUM 8.3   < > 8.3   AST 27  --   --    ALT 15  --   --    BILITOT 0.3  --   --    BILIDIR <0.2  --   --     < > = values in this interval not displayed.      CBC with Differential:    Lab Results   Component Value Date    WBC 9.9 05/13/2022    RBC 3.16 05/13/2022    HGB 8.7 05/13/2022    HCT 26.7 05/13/2022     05/13/2022    MCV 84.5 05/13/2022    MCH 27.5 05/13/2022    MCHC 32.5 05/13/2022    RDW 15.4 05/13/2022    LYMPHOPCT 10.6 05/13/2022    MONOPCT 4.9 05/13/2022    BASOPCT 0.4 05/13/2022    MONOSABS 0.5 05/13/2022    LYMPHSABS 1.0 05/13/2022    EOSABS 0.1 05/13/2022    BASOSABS 0.0 05/13/2022     CMP:    Lab Results   Component Value Date     05/13/2022    K 3.8 05/13/2022    K 4.0 05/10/2022     05/13/2022    CO2 27 05/13/2022    BUN 9 05/13/2022    CREATININE <0.5 05/13/2022    GFRAA >60 05/13/2022    LABGLOM >60 05/13/2022    GLUCOSE 104 05/13/2022    PROT 5.8 05/11/2022    LABALBU 2.7 05/11/2022    CALCIUM 8.3 05/13/2022    BILITOT 0.3 05/11/2022    ALKPHOS 120 05/11/2022    AST 27 05/11/2022    ALT 15 05/11/2022         Kirsten Arenas, APRN - CNP  Electronically signed 5/13/2022 at 11:16 AM

## 2022-05-13 NOTE — PLAN OF CARE
Problem: Discharge Planning  Goal: Discharge to home or other facility with appropriate resources  5/13/2022 0859 by Nanette Kidd RN  Outcome: Progressing  5/13/2022 0025 by Payton Miller RN  Outcome: Progressing     Problem: Pain  Goal: Verbalizes/displays adequate comfort level or baseline comfort level  5/13/2022 0859 by Nanette Kidd RN  Outcome: Progressing  5/13/2022 0025 by Payton Miller RN  Outcome: Progressing     Problem: Safety - Adult  Goal: Free from fall injury  5/13/2022 0859 by Nanette Kidd RN  Outcome: Progressing  5/13/2022 0025 by Payton Miller RN  Outcome: Progressing  Flowsheets (Taken 4/44/1248 8253 by Trina Lim, RN)  Free From Fall Injury:   Instruct family/caregiver on patient safety   Based on caregiver fall risk screen, instruct family/caregiver to ask for assistance with transferring infant if caregiver noted to have fall risk factors     Problem: ABCDS Injury Assessment  Goal: Absence of physical injury  5/13/2022 0859 by Nanette Kidd RN  Outcome: Progressing  5/13/2022 0025 by Payton Miller RN  Outcome: Progressing  Flowsheets (Taken 0/10/7998 7486 by Trina Lim, LAMIN)  Absence of Physical Injury: Implement safety measures based on patient assessment     Problem: Skin/Tissue Integrity  Goal: Absence of new skin breakdown  Description: 1. Monitor for areas of redness and/or skin breakdown  2. Assess vascular access sites hourly  3. Every 4-6 hours minimum:  Change oxygen saturation probe site  4. Every 4-6 hours:  If on nasal continuous positive airway pressure, respiratory therapy assess nares and determine need for appliance change or resting period.   5/13/2022 0859 by Nanette Kidd RN  Outcome: Progressing  5/13/2022 0025 by Payton Miller RN  Outcome: Progressing     Problem: Nutrition Deficit:  Goal: Optimize nutritional status  5/13/2022 0859 by Nanette Kidd RN  Outcome: Progressing  5/13/2022 0025 by Payton Miller RN  Outcome: Progressing

## 2022-05-13 NOTE — PROGRESS NOTES
Tube feed rate increased to 20ml/hr per bedside verbal order from ABY Martinez Patient tolerating well.

## 2022-05-13 NOTE — PROGRESS NOTES
Pt a/o. Am assessment completed see flow sheet. Patient ambulated to [facility] with stand by assist, no c/o nausea or SOB. Plan to try and wean O2 today to RA. Pt denies any pain/ needs at this time. Call light within reach. Will continue to monitor. Bedside Mobility Assessment Tool (BMAT):     Assessment Level 1- Sit and Shake    1. From a semi-reclined position, ask patient to sit up and rotate to a seated position at the side of the bed. Can use the bedrail. 2. Ask patient to reach out and grab your hand and shake making sure patient reaches across his/her midline. Pass- Patient is able to come to a seated position, maintain core strength. Maintains seated balance while reaching across midline. Move on to Assessment Level 2. Assessment Level 2- Stretch and Point   1. With patient in seated position at the side of the bed, have patient place both feet on the floor (or stool) with knees no higher than hips. 2. Ask patient to stretch one leg and straighten the knee, then bend the ankle/flex and point the toes. If appropriate, repeat with the other leg. Pass- Patient is able to demonstrate appropriate quad strength on intended weight bearing limb(s). Move onto Assessment Level 3. Assessment Level 3- Stand   1. Ask patient to elevate off the bed or chair (seated to standing) using an assistive device (cane, bedrail). 2. Patient should be able to raise buttocks off be and hold for a count of five. May repeat once. Pass- Patient maintains standing stability for at least 5 seconds, proceed to assessment level 4. Assessment Level 4- Walk   1. Ask patient to march in place at bedside. 2. Then ask patient to advance step and return each foot. Some medical conditions may render a patient from stepping backwards, use your best clinical judgement. Fail- Patient not able to complete tasks OR requires use of assistive device. Patient is MOBILITY LEVEL 3.        Mobility Level- 3     Patient is not able to demonstrated the ability to move from a reclining position to an upright position within the recliner.  however patient is alert, oriented and able to provide informed consent

## 2022-05-13 NOTE — CARE COORDINATION
INTERDISCIPLINARY PLAN OF CARE CONFERENCE    Date/Time: 5/13/2022 5:51 PM  Completed by: Adele Reyes RN, Case Management      Patient Name:  Stephanie Witt  YOB: 1960  Admitting Diagnosis: Colon cancer [facility]) [C18.9]     Admit Date/Time:  5/10/2022  6:10 AM    Chart reviewed. Interdisciplinary team contacted or reviewed plan related to patient progress and discharge plans. Disciplines included Case Management, Nursing, and Dietitian. Current Status: Stable  PT/OT recommendation for discharge plan of care: Home with 24/7 assist and home therapy    Expected D/C Disposition:  Home  Confirmed plan with patient and/or family Yes confirmed   Discharge Plan Comments:  Noted per NP Pt request that CM not see her until closer to [place]. Plan is for home. Will follow for poss home TF at [place].      Home O2 in place on admit: No  Pt informed of need to bring portable home O2 tank on day of discharge for nursing to connect prior to leaving:  Not Indicated  Verbalized agreement/Understanding:  Not Indicated

## 2022-05-13 NOTE — PROGRESS NOTES
Bedside report given and pt care transferred to [facility]. Pt denies needs at this time. Call light within reach.

## 2022-05-14 LAB
ANION GAP SERPL CALCULATED.3IONS-SCNC: 10 MMOL/L (ref 3–16)
BUN BLDV-MCNC: 14 MG/DL (ref 7–20)
CALCIUM SERPL-MCNC: 8.5 MG/DL (ref 8.3–10.6)
CHLORIDE BLD-SCNC: 98 MMOL/L (ref 99–110)
CO2: 27 MMOL/L (ref 21–32)
CREAT SERPL-MCNC: <0.5 MG/DL (ref 0.6–1.2)
GFR AFRICAN AMERICAN: >60
GFR NON-AFRICAN AMERICAN: >60
GLUCOSE BLD-MCNC: 105 MG/DL (ref 70–99)
GLUCOSE BLD-MCNC: 112 MG/DL (ref 70–99)
GLUCOSE BLD-MCNC: 117 MG/DL (ref 70–99)
GLUCOSE BLD-MCNC: 123 MG/DL (ref 70–99)
GLUCOSE BLD-MCNC: 127 MG/DL (ref 70–99)
GLUCOSE BLD-MCNC: 127 MG/DL (ref 70–99)
GLUCOSE BLD-MCNC: 96 MG/DL (ref 70–99)
GLUCOSE BLD-MCNC: 99 MG/DL (ref 70–99)
MAGNESIUM: 2.1 MG/DL (ref 1.8–2.4)
PERFORMED ON: ABNORMAL
PERFORMED ON: NORMAL
PERFORMED ON: NORMAL
PHOSPHORUS: 2.9 MG/DL (ref 2.5–4.9)
POTASSIUM SERPL-SCNC: 3.5 MMOL/L (ref 3.5–5.1)
SODIUM BLD-SCNC: 135 MMOL/L (ref 136–145)

## 2022-05-14 PROCEDURE — 83735 ASSAY OF MAGNESIUM: CPT

## 2022-05-14 PROCEDURE — 1200000000 HC SEMI PRIVATE

## 2022-05-14 PROCEDURE — 84100 ASSAY OF PHOSPHORUS: CPT

## 2022-05-14 PROCEDURE — 94761 N-INVAS EAR/PLS OXIMETRY MLT: CPT

## 2022-05-14 PROCEDURE — 97116 GAIT TRAINING THERAPY: CPT

## 2022-05-14 PROCEDURE — 2700000000 HC OXYGEN THERAPY PER DAY

## 2022-05-14 PROCEDURE — 6360000002 HC RX W HCPCS: Performed by: NURSE PRACTITIONER

## 2022-05-14 PROCEDURE — 80048 BASIC METABOLIC PNL TOTAL CA: CPT

## 2022-05-14 PROCEDURE — 2500000003 HC RX 250 WO HCPCS: Performed by: SURGERY

## 2022-05-14 PROCEDURE — 99024 POSTOP FOLLOW-UP VISIT: CPT | Performed by: SURGERY

## 2022-05-14 PROCEDURE — 2580000003 HC RX 258: Performed by: NURSE PRACTITIONER

## 2022-05-14 PROCEDURE — 36415 COLL VENOUS BLD VENIPUNCTURE: CPT

## 2022-05-14 PROCEDURE — 6360000002 HC RX W HCPCS: Performed by: SURGERY

## 2022-05-14 PROCEDURE — 2580000003 HC RX 258: Performed by: SURGERY

## 2022-05-14 RX ADMIN — FAMOTIDINE 20 MG: 10 INJECTION INTRAVENOUS at 08:36

## 2022-05-14 RX ADMIN — HYDROMORPHONE HYDROCHLORIDE 0.5 MG: 1 INJECTION, SOLUTION INTRAMUSCULAR; INTRAVENOUS; SUBCUTANEOUS at 03:04

## 2022-05-14 RX ADMIN — SODIUM CHLORIDE, SODIUM LACTATE, POTASSIUM CHLORIDE, CALCIUM CHLORIDE AND DEXTROSE MONOHYDRATE: 5; 600; 310; 30; 20 INJECTION, SOLUTION INTRAVENOUS at 08:34

## 2022-05-14 RX ADMIN — HYDROMORPHONE HYDROCHLORIDE 0.5 MG: 1 INJECTION, SOLUTION INTRAMUSCULAR; INTRAVENOUS; SUBCUTANEOUS at 06:15

## 2022-05-14 RX ADMIN — HYDROMORPHONE HYDROCHLORIDE 0.5 MG: 1 INJECTION, SOLUTION INTRAMUSCULAR; INTRAVENOUS; SUBCUTANEOUS at 19:39

## 2022-05-14 RX ADMIN — HYDROMORPHONE HYDROCHLORIDE 0.5 MG: 1 INJECTION, SOLUTION INTRAMUSCULAR; INTRAVENOUS; SUBCUTANEOUS at 14:25

## 2022-05-14 RX ADMIN — FAMOTIDINE 20 MG: 10 INJECTION INTRAVENOUS at 21:33

## 2022-05-14 RX ADMIN — ENOXAPARIN SODIUM 30 MG: 100 INJECTION SUBCUTANEOUS at 05:32

## 2022-05-14 RX ADMIN — HYDROMORPHONE HYDROCHLORIDE 0.5 MG: 1 INJECTION, SOLUTION INTRAMUSCULAR; INTRAVENOUS; SUBCUTANEOUS at 09:48

## 2022-05-14 ASSESSMENT — PAIN SCALES - GENERAL
PAINLEVEL_OUTOF10: 9
PAINLEVEL_OUTOF10: 8
PAINLEVEL_OUTOF10: 8
PAINLEVEL_OUTOF10: 9
PAINLEVEL_OUTOF10: 8
PAINLEVEL_OUTOF10: 7
PAINLEVEL_OUTOF10: 8
PAINLEVEL_OUTOF10: 8

## 2022-05-14 ASSESSMENT — PAIN - FUNCTIONAL ASSESSMENT: PAIN_FUNCTIONAL_ASSESSMENT: PREVENTS OR INTERFERES SOME ACTIVE ACTIVITIES AND ADLS

## 2022-05-14 ASSESSMENT — PAIN DESCRIPTION - LOCATION: LOCATION: ABDOMEN

## 2022-05-14 ASSESSMENT — PAIN DESCRIPTION - FREQUENCY: FREQUENCY: CONTINUOUS

## 2022-05-14 ASSESSMENT — PAIN DESCRIPTION - ORIENTATION: ORIENTATION: MID

## 2022-05-14 ASSESSMENT — PAIN DESCRIPTION - DESCRIPTORS: DESCRIPTORS: ACHING

## 2022-05-14 NOTE — PROGRESS NOTES
Inpatient Physical Therapy Daily Treatment Note    Unit: [address]  Date:  2022  Patient Name:    Gregory Brewster  Admitting diagnosis:  Colon cancer [facility]) [C18.9]  Admit Date:  5/10/2022  Precautions/Restrictions:  Fall risk and Lines -IV, Supplemental O2 (1L), NG tube, Drains (GLENN) and J tube, abdominal incision      Discharge Recommendations: Home PRN assist   DME needs for discharge: Needs Met       Therapy recommendation for EMS Transport: can transport by wheelchair    Therapy recommendations for staff:   Supervision with use of No AD for all transfers and ambulation to/from [facility]  to/from chair  to/from bathroom  within room  within halls    History of Present Illness: Per Dr Karis Alcazar:  S/p RIGHT COLECTOMY, PARTIAL DUODENECTOMY, SMALL BOWEL RESECTION, CHOLECYSTECTOMY, INSERTION OF FEEDING JEJUNOSTOMY TUBE on 5/10/22    Home Health S4 Level Recommendation: NA  AM-PAC Mobility Score   AM-PAC Inpatient Mobility Raw Score : 23  AM-PAC Inpatient Mobility without Stair Climbing Raw Score : 18    Treatment Time:  8552-7196  Treatment number: 2  Timed Code Treatment Minutes: 25 minutes  Total Treatment Minutes:  25  minutes    Cognition    A&O x4   Able to follow 2 step commands    Subjective  Patient lying supine in bed with no family present   Pt agreeable to this PT tx. Pain   Yes  Location: Abdomen  Ratin and 9/10  Pain Medicine Status: Pain med requested and RN notified (pt requested to have pain medication following therapy session)    Bed Mobility   Supine to Sit:    Independent  Sit to Supine:   Independent  Rolling:   Not Tested  Scooting:   Independent    Transfer Training     Sit to stand:   Independent  Stand to sit:    Independent  Bed to Chair:   Not Tested with use of N/A    Gait Training gait completed as indicated below  Distance:      584 ft  Deviations (firm surface/linoleum):  decreased kath  Assistive Device Used:    No AD  Level of Assist:    Supervision  Comment: Assist for line management    Stair Training deferred, pt does not have stairs in the home environment  # of Steps:   N/A  Level of Assist:  Not Tested  UE Support:  NA  Assistive Device:  N/A  Pattern:   N/A  Comments:     Therapeutic Exercise Teri deferred secondary to treatment focus on functional mobility  NA    Balance  Sitting:  Normal; Independent  Comments:     Standing: Normal; Independent  Comments:     Patient Education      Role of PT, POC, Discharge recommendations, safety awareness, pacing activity and calling for assist with mobility. Importance of ambulation multiple times/day    Positioning Needs       Pt in bed, no alarm needed, positioned in proper neutral alignment and pressure relief provided. Call light provided and all needs within reach    Activity Tolerance   Pt completed therapy session with No adverse symptoms noted w/activity. SpO2: 93-95% on 1L O2 throughout    Other  Pt reported flatulence with standing at EOB    Assessment :  Patient demonstrates improved tolerance for ambulation demonstrated by increased distance. Pt with good balance with turning. Recommending Home PRN assist upon discharge as patient functioning close to baseline level    Goals (all goals ongoing unless otherwise indicated)  To be met in 3 visits:  1). Independent with LE Ex x 10 reps     To be met in 6 visits:  1). Supine to/from sit: Independent - MET  2). Sit to/from stand: Supervision - MET  3). Bed to chair: Supervision  4). Gait: Ambulate 150 ft.  with  Supervision and use of No AD - MET, Upgrade 5/14: IND x 150 ft with no AD  5). Tolerate B LE exercises 3 sets of 10-15 reps  6). Ascend/descend 2 steps /s handrail /c CGA     Plan   Continue with plan of care. Try steps next visit. Signature: Carmella Vides, PT, DPT #[id number]    If patient discharges from this facility prior to next visit, this note will serve as the Discharge Summary.

## 2022-05-14 NOTE — PROGRESS NOTES
[facility] GENERAL SURGERY    Surgery Progress Note           POD # 4    PATIENT NAME: Sydney Akins     TODAY'S DATE: 5/14/2022    INTERVAL HISTORY:    Pt without flatus, pain stable. OBJECTIVE:   VITALS:  BP (!) 147/70   Pulse 96   Temp 97 °F (36.1 °C) (Oral)   Resp 16   Ht 5' 3\" (1.6 m)   Wt 98 lb (44.5 kg)   SpO2 94%   BMI 17.36 kg/m²     INTAKE/OUTPUT:    I/O last 3 completed shifts: In: 2252 [P.O.:150; I.V.:1353; NG/GT:749]  Out: 4840 [Urine:1570; Emesis/NG output:2200; Drains:190]  I/O this shift: In: 0   Out: 125 [Urine:100; Drains:25]             ABDOMEN:   hypoactive bowel sounds, soft, non-distended, tender, jtube secure, jennifer serosanguinous   INCISION: dry    Data:  CBC: Recent Labs     05/12/22  0520 05/13/22  0943   WBC 11.4* 9.9   HGB 8.3* 8.7*   HCT 25.8* 26.7*    265     BMP:    Recent Labs     05/12/22  0519 05/13/22  0943 05/14/22  0554   * 136 135*   K 4.3 3.8 3.5    100 98*   CO2 26 27 27   BUN 9 9 14   CREATININE <0.5* <0.5* <0.5*   GLUCOSE 109* 104* 117*     Hepatic: No results for input(s): AST, ALT, ALB, BILITOT, ALKPHOS in the last 72 hours. Mag:      Recent Labs     05/12/22  0519 05/13/22  0943 05/14/22  0554   MG 2.00 2.20 2.10      Phos:     Recent Labs     05/12/22  0519 05/13/22  0943 05/14/22  0554   PHOS 2.0* 2.9 2.9      INR: No results for input(s): INR in the last 72 hours.       Radiology Review:  NA    ASSESSMENT AND PLAN:  1. 59 y.o. female status post right colectomy, partial duodenectomy, SBO, immanuel, J-tube insertion  - increase tube feeds to 30ml/hr  -continue ng to lcws          Electronically signed by Donaldo Morse MD

## 2022-05-14 NOTE — FLOWSHEET NOTE
05/14/22 0830   Vital Signs   Temp 97 °F (36.1 °C)   Temp Source Oral   Pulse 96   Heart Rate Source Monitor   Resp 16   BP (!) 147/70   BP Location Left upper arm   MAP (Calculated) 95.67   Patient Position Sitting   Level of Consciousness Alert (0)   MEWS Score 1   Pain Assessment   Pain Assessment 0-10   Pain Level 8   Patient's Stated Pain Goal 4   Pain Location Abdomen   Pain Orientation Mid   Pain Descriptors Aching   Functional Pain Assessment Prevents or interferes some active activities and ADLs   Pain Frequency Continuous   Non-Pharmaceutical Pain Intervention(s) Ice;Repositioned; Rest   Response to Pain Intervention None (pain unchanged or no improvement)   Oxygen Therapy   SpO2 94 %   O2 Device Nasal cannula   Skin Assessment Clean, dry, & intact   O2 Flow Rate (L/min) 1 L/min   AM assessment completed, see flow sheet. Pt is alert and oriented. Respirations are even & easy, diminished lung sounds bilaterally but clear. Pt has not yet been able to pass gas or have a BM. No complaints voiced. Pt denies needs at this time. SR up x 2, and bed in low position. Call light is within reach.

## 2022-05-14 NOTE — PLAN OF CARE
Problem: Discharge Planning  Goal: Discharge to home or other facility with appropriate resources  5/13/2022 2242 by Cherelle Martinez RN  Outcome: Progressing  5/13/2022 0859 by Isabella Ferreira RN  Outcome: Progressing     Problem: Pain  Goal: Verbalizes/displays adequate comfort level or baseline comfort level  5/13/2022 2242 by Cherelle Martinez RN  Outcome: Progressing  5/13/2022 0859 by Isabella Ferreira RN  Outcome: Progressing     Problem: Safety - Adult  Goal: Free from fall injury  5/13/2022 2242 by Cherelle Martinez RN  Outcome: Progressing  5/13/2022 0859 by Isabella Ferreira RN  Outcome: Progressing     Problem: ABCDS Injury Assessment  Goal: Absence of physical injury  5/13/2022 2242 by Cherelle Martinez RN  Outcome: Progressing  5/13/2022 0859 by Isabella Ferreira RN  Outcome: Progressing     Problem: Skin/Tissue Integrity  Goal: Absence of new skin breakdown  Description: 1. Monitor for areas of redness and/or skin breakdown  2. Assess vascular access sites hourly  3. Every 4-6 hours minimum:  Change oxygen saturation probe site  4. Every 4-6 hours:  If on nasal continuous positive airway pressure, respiratory therapy assess nares and determine need for appliance change or resting period.   5/13/2022 2242 by Cherelle Martinez RN  Outcome: Progressing  5/13/2022 0859 by Isabella Ferreira RN  Outcome: Progressing     Problem: Nutrition Deficit:  Goal: Optimize nutritional status  5/13/2022 2242 by Cherelle Martinez RN  Outcome: Progressing  5/13/2022 0859 by Isabella Ferreira RN  Outcome: Progressing

## 2022-05-14 NOTE — PLAN OF CARE
Problem: Discharge Planning  Goal: Discharge to home or other facility with appropriate resources  5/14/2022 1113 by Cezar Cedeño RN  Outcome: Progressing  5/13/2022 2242 by Liz Hdez RN  Outcome: Progressing     Problem: Pain  Goal: Verbalizes/displays adequate comfort level or baseline comfort level  5/14/2022 1113 by Cezar Cedeño RN  Outcome: Progressing  5/13/2022 2242 by Liz Hdez RN  Outcome: Progressing     Problem: Safety - Adult  Goal: Free from fall injury  5/14/2022 1113 by Cezar Cedeño RN  Outcome: Progressing  5/13/2022 2242 by Liz Hdez RN  Outcome: Progressing     Problem: ABCDS Injury Assessment  Goal: Absence of physical injury  5/14/2022 1113 by Cezar Cedeño RN  Outcome: Progressing  5/13/2022 2242 by Liz Hdez RN  Outcome: Progressing     Problem: Skin/Tissue Integrity  Goal: Absence of new skin breakdown  Description: 1. Monitor for areas of redness and/or skin breakdown  2. Assess vascular access sites hourly  3. Every 4-6 hours minimum:  Change oxygen saturation probe site  4. Every 4-6 hours:  If on nasal continuous positive airway pressure, respiratory therapy assess nares and determine need for appliance change or resting period.   5/14/2022 1113 by Cezar Cedeño RN  Outcome: Progressing  5/13/2022 2242 by Liz Hdez RN  Outcome: Progressing     Problem: Nutrition Deficit:  Goal: Optimize nutritional status  5/14/2022 1113 by Cezar Cedeño RN  Outcome: Progressing  5/13/2022 2242 by Liz Hdez RN  Outcome: Progressing

## 2022-05-15 LAB
ANION GAP SERPL CALCULATED.3IONS-SCNC: 8 MMOL/L (ref 3–16)
BASOPHILS ABSOLUTE: 0 K/UL (ref 0–0.2)
BASOPHILS RELATIVE PERCENT: 0.3 %
BUN BLDV-MCNC: 15 MG/DL (ref 7–20)
CALCIUM SERPL-MCNC: 8.3 MG/DL (ref 8.3–10.6)
CHLORIDE BLD-SCNC: 100 MMOL/L (ref 99–110)
CO2: 30 MMOL/L (ref 21–32)
CREAT SERPL-MCNC: <0.5 MG/DL (ref 0.6–1.2)
EOSINOPHILS ABSOLUTE: 0.1 K/UL (ref 0–0.6)
EOSINOPHILS RELATIVE PERCENT: 1 %
GFR AFRICAN AMERICAN: >60
GFR NON-AFRICAN AMERICAN: >60
GLUCOSE BLD-MCNC: 116 MG/DL (ref 70–99)
GLUCOSE BLD-MCNC: 118 MG/DL (ref 70–99)
GLUCOSE BLD-MCNC: 119 MG/DL (ref 70–99)
GLUCOSE BLD-MCNC: 119 MG/DL (ref 70–99)
GLUCOSE BLD-MCNC: 129 MG/DL (ref 70–99)
GLUCOSE BLD-MCNC: 129 MG/DL (ref 70–99)
HCT VFR BLD CALC: 26.4 % (ref 36–48)
HEMOGLOBIN: 8.5 G/DL (ref 12–16)
LYMPHOCYTES ABSOLUTE: 1.3 K/UL (ref 1–5.1)
LYMPHOCYTES RELATIVE PERCENT: 13 %
MCH RBC QN AUTO: 27.4 PG (ref 26–34)
MCHC RBC AUTO-ENTMCNC: 32.3 G/DL (ref 31–36)
MCV RBC AUTO: 84.8 FL (ref 80–100)
MONOCYTES ABSOLUTE: 0.8 K/UL (ref 0–1.3)
MONOCYTES RELATIVE PERCENT: 8.3 %
NEUTROPHILS ABSOLUTE: 7.8 K/UL (ref 1.7–7.7)
NEUTROPHILS RELATIVE PERCENT: 77.4 %
PDW BLD-RTO: 15.2 % (ref 12.4–15.4)
PERFORMED ON: ABNORMAL
PLATELET # BLD: 272 K/UL (ref 135–450)
PMV BLD AUTO: 6.8 FL (ref 5–10.5)
POTASSIUM SERPL-SCNC: 3.6 MMOL/L (ref 3.5–5.1)
RBC # BLD: 3.12 M/UL (ref 4–5.2)
SODIUM BLD-SCNC: 138 MMOL/L (ref 136–145)
WBC # BLD: 10.1 K/UL (ref 4–11)

## 2022-05-15 PROCEDURE — 85025 COMPLETE CBC W/AUTO DIFF WBC: CPT

## 2022-05-15 PROCEDURE — 2700000000 HC OXYGEN THERAPY PER DAY

## 2022-05-15 PROCEDURE — 2500000003 HC RX 250 WO HCPCS: Performed by: SURGERY

## 2022-05-15 PROCEDURE — 6360000002 HC RX W HCPCS: Performed by: NURSE PRACTITIONER

## 2022-05-15 PROCEDURE — 6360000002 HC RX W HCPCS: Performed by: SURGERY

## 2022-05-15 PROCEDURE — 36415 COLL VENOUS BLD VENIPUNCTURE: CPT

## 2022-05-15 PROCEDURE — 1200000000 HC SEMI PRIVATE

## 2022-05-15 PROCEDURE — 94761 N-INVAS EAR/PLS OXIMETRY MLT: CPT

## 2022-05-15 PROCEDURE — 99024 POSTOP FOLLOW-UP VISIT: CPT | Performed by: SURGERY

## 2022-05-15 PROCEDURE — 2580000003 HC RX 258: Performed by: SURGERY

## 2022-05-15 PROCEDURE — 80048 BASIC METABOLIC PNL TOTAL CA: CPT

## 2022-05-15 RX ADMIN — HYDROMORPHONE HYDROCHLORIDE 0.5 MG: 1 INJECTION, SOLUTION INTRAMUSCULAR; INTRAVENOUS; SUBCUTANEOUS at 07:22

## 2022-05-15 RX ADMIN — FAMOTIDINE 20 MG: 10 INJECTION INTRAVENOUS at 10:12

## 2022-05-15 RX ADMIN — HYDROMORPHONE HYDROCHLORIDE 0.5 MG: 1 INJECTION, SOLUTION INTRAMUSCULAR; INTRAVENOUS; SUBCUTANEOUS at 00:44

## 2022-05-15 RX ADMIN — HYDROMORPHONE HYDROCHLORIDE 0.5 MG: 1 INJECTION, SOLUTION INTRAMUSCULAR; INTRAVENOUS; SUBCUTANEOUS at 12:47

## 2022-05-15 RX ADMIN — HYDROMORPHONE HYDROCHLORIDE 0.5 MG: 1 INJECTION, SOLUTION INTRAMUSCULAR; INTRAVENOUS; SUBCUTANEOUS at 23:58

## 2022-05-15 RX ADMIN — HYDROMORPHONE HYDROCHLORIDE 0.5 MG: 1 INJECTION, SOLUTION INTRAMUSCULAR; INTRAVENOUS; SUBCUTANEOUS at 20:10

## 2022-05-15 RX ADMIN — FAMOTIDINE 20 MG: 10 INJECTION INTRAVENOUS at 20:10

## 2022-05-15 RX ADMIN — ENOXAPARIN SODIUM 30 MG: 100 INJECTION SUBCUTANEOUS at 05:30

## 2022-05-15 RX ADMIN — HYDROMORPHONE HYDROCHLORIDE 0.5 MG: 1 INJECTION, SOLUTION INTRAMUSCULAR; INTRAVENOUS; SUBCUTANEOUS at 04:12

## 2022-05-15 ASSESSMENT — PAIN DESCRIPTION - FREQUENCY: FREQUENCY: CONTINUOUS

## 2022-05-15 ASSESSMENT — PAIN - FUNCTIONAL ASSESSMENT
PAIN_FUNCTIONAL_ASSESSMENT: PREVENTS OR INTERFERES SOME ACTIVE ACTIVITIES AND ADLS
PAIN_FUNCTIONAL_ASSESSMENT: PREVENTS OR INTERFERES SOME ACTIVE ACTIVITIES AND ADLS

## 2022-05-15 ASSESSMENT — PAIN SCALES - GENERAL
PAINLEVEL_OUTOF10: 8
PAINLEVEL_OUTOF10: 7
PAINLEVEL_OUTOF10: 8

## 2022-05-15 ASSESSMENT — PAIN DESCRIPTION - LOCATION
LOCATION: ABDOMEN
LOCATION: ABDOMEN

## 2022-05-15 ASSESSMENT — PAIN DESCRIPTION - PAIN TYPE
TYPE: ACUTE PAIN
TYPE: SURGICAL PAIN

## 2022-05-15 ASSESSMENT — PAIN DESCRIPTION - DESCRIPTORS
DESCRIPTORS: ACHING
DESCRIPTORS: ACHING

## 2022-05-15 ASSESSMENT — PAIN DESCRIPTION - ORIENTATION
ORIENTATION: MID
ORIENTATION: MID

## 2022-05-15 ASSESSMENT — PAIN DESCRIPTION - ONSET: ONSET: GRADUAL

## 2022-05-15 NOTE — FLOWSHEET NOTE
05/15/22 0850   Vital Signs   Temp 97.2 °F (36.2 °C)   Temp Source Oral   Pulse 91   Heart Rate Source Monitor   Resp 18   BP (!) 145/74   BP Location Right upper arm   MAP (Calculated) 97.67   Patient Position Semi fowlers   Level of Consciousness Alert (0)   MEWS Score 1   Pain Assessment   Pain Assessment 0-10   Pain Level 8   Patient's Stated Pain Goal 4   Pain Location Abdomen   Pain Orientation Mid   Pain Descriptors Aching   Functional Pain Assessment Prevents or interferes some active activities and ADLs   Pain Type Acute pain   Non-Pharmaceutical Pain Intervention(s) Ice   Oxygen Therapy   SpO2 94 %   O2 Device None (Room air)   AM assessment completed, see flow sheet. Pt is alert and oriented. VSS. NG clamped at this time. Tube feeds increased to 40 ml/hr. Respirations are even & easy. No complaints voiced. Pt denies needs at this time. SR up x 2, and bed in low position. Call light is within reach.

## 2022-05-15 NOTE — PLAN OF CARE
Problem: Discharge Planning  Goal: Discharge to home or other facility with appropriate resources  5/15/2022 1320 by Cyril Cochran RN  Outcome: Progressing  5/14/2022 2352 by Kyra Carter RN  Outcome: Progressing     Problem: Pain  Goal: Verbalizes/displays adequate comfort level or baseline comfort level  5/15/2022 1320 by Cyril Cochran RN  Outcome: Progressing  5/14/2022 2352 by Kyra Carter RN  Outcome: Progressing     Problem: Safety - Adult  Goal: Free from fall injury  5/15/2022 1320 by Cyril Cochran RN  Outcome: Progressing  5/14/2022 2352 by Kyra Carter RN  Outcome: Karon Marion (Taken 5/14/2022 1115 by Cyril Cochran RN)  Free From Fall Injury: Instruct family/caregiver on patient safety     Problem: ABCDS Injury Assessment  Goal: Absence of physical injury  5/15/2022 1320 by Cyril Cochran RN  Outcome: Progressing  5/14/2022 2352 by Kyra Carter RN  Outcome: Progressing  Flowsheets (Taken 5/14/2022 1115 by Cyril Cochran RN)  Absence of Physical Injury: Implement safety measures based on patient assessment     Problem: Skin/Tissue Integrity  Goal: Absence of new skin breakdown  Description: 1. Monitor for areas of redness and/or skin breakdown  2. Assess vascular access sites hourly  3. Every 4-6 hours minimum:  Change oxygen saturation probe site  4. Every 4-6 hours:  If on nasal continuous positive airway pressure, respiratory therapy assess nares and determine need for appliance change or resting period.   5/15/2022 1320 by Cyril Cochran RN  Outcome: Progressing  5/14/2022 2352 by Kyra Carter RN  Outcome: Progressing     Problem: Nutrition Deficit:  Goal: Optimize nutritional status  5/15/2022 1320 by Cyril Cochran RN  Outcome: Progressing  5/14/2022 2352 by Kyra Carter RN  Outcome: Progressing

## 2022-05-15 NOTE — PLAN OF CARE
Problem: Discharge Planning  Goal: Discharge to home or other facility with appropriate resources  5/14/2022 2352 by Felicitas Bamberger, RN  Outcome: Progressing  5/14/2022 1113 by Samantha Hedrick RN  Outcome: Progressing     Problem: Pain  Goal: Verbalizes/displays adequate comfort level or baseline comfort level  5/14/2022 2352 by Felicitas Bamberger, RN  Outcome: Progressing  5/14/2022 1113 by Samantha Hedrick RN  Outcome: Progressing     Problem: Safety - Adult  Goal: Free from fall injury  5/14/2022 2352 by Felicitas Bamberger, RN  Outcome: Olivia May (Taken 5/14/2022 1115 by Samantha Hedrick, RN)  Free From Fall Injury: Instruct family/caregiver on patient safety  5/14/2022 1113 by Samantha Hedrick RN  Outcome: Progressing     Problem: ABCDS Injury Assessment  Goal: Absence of physical injury  5/14/2022 2352 by Felicitas Bamberger, RN  Outcome: Progressing  Flowsheets (Taken 5/14/2022 1115 by Samantha Hedrick, RN)  Absence of Physical Injury: Implement safety measures based on patient assessment  5/14/2022 1113 by Samantha Hedrick RN  Outcome: Progressing     Problem: Skin/Tissue Integrity  Goal: Absence of new skin breakdown  Description: 1. Monitor for areas of redness and/or skin breakdown  2. Assess vascular access sites hourly  3. Every 4-6 hours minimum:  Change oxygen saturation probe site  4. Every 4-6 hours:  If on nasal continuous positive airway pressure, respiratory therapy assess nares and determine need for appliance change or resting period.   5/14/2022 2352 by Felicitas Bamberger, RN  Outcome: Progressing  5/14/2022 1113 by Samantha Hedrick RN  Outcome: Progressing     Problem: Nutrition Deficit:  Goal: Optimize nutritional status  5/14/2022 2352 by Felicitas Bamberger, RN  Outcome: Progressing  5/14/2022 1113 by Samantha Hedrick RN  Outcome: Progressing

## 2022-05-15 NOTE — PROGRESS NOTES
[facility] GENERAL SURGERY    Surgery Progress Note           POD # 5    PATIENT NAME: Gregory Brewster     TODAY'S DATE: 5/15/2022    INTERVAL HISTORY:    Pt having bms, pain stable     OBJECTIVE:   VITALS:  BP (!) 145/74   Pulse 91   Temp 97.2 °F (36.2 °C) (Oral)   Resp 18   Ht 5' 3\" (1.6 m)   Wt 98 lb (44.5 kg)   SpO2 94%   BMI 17.36 kg/m²     INTAKE/OUTPUT:    I/O last 3 completed shifts: In: 0   Out: 4190 [GDYBN:9988; Emesis/NG output:2650; Drains:165]  No intake/output data recorded. ABDOMEN:   hypoactive bowel sounds, soft, non-distended, tender, jtube secure, jennifer serosanguinous   INCISION: dry    Data:  CBC:   Recent Labs     05/13/22  0943 05/15/22  0616   WBC 9.9 10.1   HGB 8.7* 8.5*   HCT 26.7* 26.4*    272     BMP:    Recent Labs     05/13/22  0943 05/14/22  0554 05/15/22  0616    135* 138   K 3.8 3.5 3.6    98* 100   CO2 27 27 30   BUN 9 14 15   CREATININE <0.5* <0.5* <0.5*   GLUCOSE 104* 117* 129*     Hepatic: No results for input(s): AST, ALT, ALB, BILITOT, ALKPHOS in the last 72 hours. Mag:      Recent Labs     05/13/22  0943 05/14/22  0554   MG 2.20 2.10      Phos:     Recent Labs     05/13/22  0943 05/14/22  0554   PHOS 2.9 2.9      INR: No results for input(s): INR in the last 72 hours. Radiology Review:  NA    ASSESSMENT AND PLAN:  1. 59 y.o. female status post right colectomy, partial duodenectomy, SBO, immanuel, J-tube insertion  - increase tube feeds to goal rate  -clamp ng 3/4 hours. Probably keep in until tomorrow given output and nature of operation.     Electronically signed by Charu Pina MD

## 2022-05-15 NOTE — PROGRESS NOTES
The patient's dressing covering her GLENN drain was seeping serosanguineous drainage on to her gown. Nurse changed the dressing covering the GLENN drain, patient tolerated procedure well.

## 2022-05-16 PROBLEM — E43 SEVERE PROTEIN-CALORIE MALNUTRITION (HCC): Status: ACTIVE | Noted: 2022-05-16

## 2022-05-16 LAB
GLUCOSE BLD-MCNC: 110 MG/DL (ref 70–99)
GLUCOSE BLD-MCNC: 117 MG/DL (ref 70–99)
GLUCOSE BLD-MCNC: 126 MG/DL (ref 70–99)
GLUCOSE BLD-MCNC: 129 MG/DL (ref 70–99)
GLUCOSE BLD-MCNC: 131 MG/DL (ref 70–99)
GLUCOSE BLD-MCNC: 142 MG/DL (ref 70–99)
PERFORMED ON: ABNORMAL

## 2022-05-16 PROCEDURE — 2580000003 HC RX 258: Performed by: SURGERY

## 2022-05-16 PROCEDURE — 6360000002 HC RX W HCPCS: Performed by: SURGERY

## 2022-05-16 PROCEDURE — 2500000003 HC RX 250 WO HCPCS: Performed by: SURGERY

## 2022-05-16 PROCEDURE — 99024 POSTOP FOLLOW-UP VISIT: CPT | Performed by: NURSE PRACTITIONER

## 2022-05-16 PROCEDURE — 97112 NEUROMUSCULAR REEDUCATION: CPT

## 2022-05-16 PROCEDURE — 1200000000 HC SEMI PRIVATE

## 2022-05-16 PROCEDURE — 97530 THERAPEUTIC ACTIVITIES: CPT

## 2022-05-16 PROCEDURE — 2580000003 HC RX 258: Performed by: NURSE PRACTITIONER

## 2022-05-16 PROCEDURE — 6360000002 HC RX W HCPCS: Performed by: NURSE PRACTITIONER

## 2022-05-16 RX ADMIN — HYDROMORPHONE HYDROCHLORIDE 0.5 MG: 1 INJECTION, SOLUTION INTRAMUSCULAR; INTRAVENOUS; SUBCUTANEOUS at 21:04

## 2022-05-16 RX ADMIN — FAMOTIDINE 20 MG: 10 INJECTION INTRAVENOUS at 09:03

## 2022-05-16 RX ADMIN — HYDROMORPHONE HYDROCHLORIDE 0.5 MG: 1 INJECTION, SOLUTION INTRAMUSCULAR; INTRAVENOUS; SUBCUTANEOUS at 09:03

## 2022-05-16 RX ADMIN — ENOXAPARIN SODIUM 30 MG: 100 INJECTION SUBCUTANEOUS at 05:56

## 2022-05-16 RX ADMIN — SODIUM CHLORIDE, SODIUM LACTATE, POTASSIUM CHLORIDE, CALCIUM CHLORIDE AND DEXTROSE MONOHYDRATE: 5; 600; 310; 30; 20 INJECTION, SOLUTION INTRAVENOUS at 18:33

## 2022-05-16 RX ADMIN — HYDROMORPHONE HYDROCHLORIDE 0.5 MG: 1 INJECTION, SOLUTION INTRAMUSCULAR; INTRAVENOUS; SUBCUTANEOUS at 15:08

## 2022-05-16 RX ADMIN — FAMOTIDINE 20 MG: 10 INJECTION INTRAVENOUS at 21:04

## 2022-05-16 RX ADMIN — HYDROMORPHONE HYDROCHLORIDE 0.5 MG: 1 INJECTION, SOLUTION INTRAMUSCULAR; INTRAVENOUS; SUBCUTANEOUS at 04:40

## 2022-05-16 ASSESSMENT — PAIN SCALES - GENERAL
PAINLEVEL_OUTOF10: 8

## 2022-05-16 ASSESSMENT — PAIN DESCRIPTION - LOCATION
LOCATION: ABDOMEN
LOCATION: ABDOMEN

## 2022-05-16 ASSESSMENT — PAIN DESCRIPTION - DESCRIPTORS: DESCRIPTORS: SHARP;BURNING

## 2022-05-16 NOTE — PROGRESS NOTES
General Surgery - Kirsten Juarez, GUY - CNP, CNP  Daily Progress Note    Pt Name: Karthikeyan Scott  Medical Record Number: [id number]  Date of Birth 1960   Today's Date: 5/16/2022    ASSESSMENT  1. POD #6 s/p right colectomy, partial duodenectomy, SBO, immanuel, J-tube insertion  2. ABD: soft, + incisional tenderness, flat, GLENN in place, a J-tube in place, midline staples look good, no N, no V, NGT has been clamped since Saturday: pt has taken 5-6 cups of ice per day, + flatus, + BM  3. H&H stable 8.5/26.4  4. GLENN: 50 serous   5. VSS  6. NGT: clamped since satuday  7. UO good  8. Pt states \"I am still sore but, doing so much better. \" Pt was able to ambulate with PT today     PLAN  1. Pt tolerated clear liquids with NGT clamped: will trial full liquids this afternoon  2. TF: rate at goal  3. Pepcid IVP  4. DVT proph: Lovenox  5. PT/OT  6. IVF at 50 ml/hr  7. Labs in the am  8. Ccontinue strict I&Os  9. Pain control  10. Up to chair/ambulate as tolerated  11. IS q 1 hour while awake  12. Pt is POD #6 and looks good, TF via her J-tube at goal. Trial full liquids this afternoon. Shabbir Magaña has improved from yesterday. Pain is better controlled. She has no  nausea and no vomiting. She has passed flatus and has had a bowel movement. She is tolerating clear liquids and TF via her J-tube. Current activity is up with assistance    OBJECTIVE  VITALS:  height is 5' 3\" (1.6 m) and weight is 98 lb (44.5 kg). Her oral temperature is 97.8 °F (36.6 °C). Her blood pressure is 151/70 (abnormal) and her pulse is 93. Her respiration is 18 and oxygen saturation is 94%.    VITALS:  BP (!) 151/70   Pulse 93   Temp 97.8 °F (36.6 °C) (Oral)   Resp 18   Ht 5' 3\" (1.6 m)   Wt 98 lb (44.5 kg)   SpO2 94%   BMI 17.36 kg/m²   INTAKE/OUTPUT:      Intake/Output Summary (Last 24 hours) at 5/16/2022 1430  Last data filed at 5/16/2022 0708  Gross per 24 hour   Intake --   Output 600 ml   Net -600 ml     URINARY CATHETER OUTPUT (Andrade):     GENERAL: alert, cooperative, no distress  I/O last 3 completed shifts: In: 30 [NG/GT:30]  Out: 1870 [Urine:400; Emesis/NG output:1350; Drains:120]  I/O this shift:  In: -   Out: 50 [Drains:50]    LABS  Recent Labs     05/14/22  0554 05/14/22  0554 05/15/22  0616   WBC  --   --  10.1   HGB  --   --  8.5*   HCT  --   --  26.4*   PLT  --   --  272   *   < > 138   K 3.5   < > 3.6   CL 98*   < > 100   CO2 27   < > 30   BUN 14   < > 15   CREATININE <0.5*   < > <0.5*   MG 2.10  --   --    PHOS 2.9  --   --    CALCIUM 8.5   < > 8.3    < > = values in this interval not displayed.      CBC with Differential:    Lab Results   Component Value Date    WBC 10.1 05/15/2022    RBC 3.12 05/15/2022    HGB 8.5 05/15/2022    HCT 26.4 05/15/2022     05/15/2022    MCV 84.8 05/15/2022    MCH 27.4 05/15/2022    MCHC 32.3 05/15/2022    RDW 15.2 05/15/2022    LYMPHOPCT 13.0 05/15/2022    MONOPCT 8.3 05/15/2022    BASOPCT 0.3 05/15/2022    MONOSABS 0.8 05/15/2022    LYMPHSABS 1.3 05/15/2022    EOSABS 0.1 05/15/2022    BASOSABS 0.0 05/15/2022     CMP:    Lab Results   Component Value Date     05/15/2022    K 3.6 05/15/2022    K 4.0 05/10/2022     05/15/2022    CO2 30 05/15/2022    BUN 15 05/15/2022    CREATININE <0.5 05/15/2022    GFRAA >60 05/15/2022    LABGLOM >60 05/15/2022    GLUCOSE 129 05/15/2022    PROT 5.8 05/11/2022    LABALBU 2.7 05/11/2022    CALCIUM 8.3 05/15/2022    BILITOT 0.3 05/11/2022    ALKPHOS 120 05/11/2022    AST 27 05/11/2022    ALT 15 05/11/2022         GUY Cohen - CNP  Electronically signed 5/16/2022 at 2:30 PM

## 2022-05-16 NOTE — FLOWSHEET NOTE
05/16/22 0903   Vital Signs   Temp 98.8 °F (37.1 °C)   Temp Source Oral   Pulse 94   Heart Rate Source Monitor   Resp 18   BP (!) 147/70   BP Location Left upper arm   MAP (Calculated) 95.67   Patient Position Sitting   Level of Consciousness Alert (0)   MEWS Score 1   Pain Assessment   Pain Assessment 0-10   Pain Level 8   Opioid-Induced Sedation   POSS Score 1   Oxygen Therapy   SpO2 94 %   O2 Device None (Room air)   AM assessment completed, see flow sheet. Pt is alert and oriented. Vital signs are stable. Respirations are even & easy. Lung sounds diminished but clear. Persistent and productive moist cough continues. No complaints voiced. Pt denies needs at this time. SR up x 2, and bed in low position. Call light is within reach.

## 2022-05-16 NOTE — PLAN OF CARE
Problem: Discharge Planning  Goal: Discharge to home or other facility with appropriate resources  5/15/2022 2206 by Sara Alvares RN  Outcome: Progressing  5/15/2022 1320 by Kameron Ibarra RN  Outcome: Progressing     Problem: Pain  Goal: Verbalizes/displays adequate comfort level or baseline comfort level  5/15/2022 2206 by Sara Alvares RN  Outcome: Progressing  5/15/2022 1320 by Kameron Ibarra RN  Outcome: Progressing     Problem: Safety - Adult  Goal: Free from fall injury  5/15/2022 2206 by Sara Alvares RN  Outcome: Progressing  5/15/2022 1320 by Kameron Ibarra RN  Outcome: Progressing     Problem: ABCDS Injury Assessment  Goal: Absence of physical injury  5/15/2022 2206 by Sara Alvares RN  Outcome: Progressing  Flowsheets (Taken 5/15/2022 1322 by Kameron Ibarra RN)  Absence of Physical Injury: Implement safety measures based on patient assessment  5/15/2022 1320 by Kameron Ibarra RN  Outcome: Progressing     Problem: Skin/Tissue Integrity  Goal: Absence of new skin breakdown  Description: 1. Monitor for areas of redness and/or skin breakdown  2. Assess vascular access sites hourly  3. Every 4-6 hours minimum:  Change oxygen saturation probe site  4. Every 4-6 hours:  If on nasal continuous positive airway pressure, respiratory therapy assess nares and determine need for appliance change or resting period.   5/15/2022 2206 by Sara Alvares RN  Outcome: Progressing  5/15/2022 1320 by Kameron Ibarra RN  Outcome: Progressing     Problem: Nutrition Deficit:  Goal: Optimize nutritional status  5/15/2022 2206 by Sara Alvares RN  Outcome: Progressing  5/15/2022 1320 by Kameron Ibarra RN  Outcome: Progressing

## 2022-05-16 NOTE — CARE COORDINATION
INTERDISCIPLINARY PLAN OF CARE CONFERENCE    Date/Time: 5/16/2022 12:13 PM  Completed by: Nannette Pena RN, Case Management      Patient Name:  Carlos Mitchell  YOB: 1960  Admitting Diagnosis: Colon cancer [facility]) [C18.9]     Admit Date/Time:  5/10/2022  6:10 AM    Chart reviewed. Interdisciplinary team contacted or reviewed plan related to patient progress and discharge plans. Disciplines included Case Management, Nursing, and Dietitian. Current Status:ongoing; needs to tolerate diet    PT/OT recommendation for discharge plan of care: Home PRN assist     Expected D/C Disposition:  Home  Discharge Plan Comments: Chart reviewed. Pt from home alone but has a great support system with friends that will assist as needed. Will follow for [place] closer to [place]. Per notes [facility] for home TF:    Received coverage information from Graciela Rey with [employer] [phone]). Pt is covered at 100% for TF. Graciela Rey is able to come to hospital prior to discharge for some teaching. Please notify him to schedule.      Home O2 in place on admit: No

## 2022-05-16 NOTE — PROGRESS NOTES
Physician Progress Note      PATIENT:               Jeff Eaton  CSN #:                  [id number]  :                       1960  ADMIT DATE:       5/10/2022 6:10 AM  DISCH DATE:  RESPONDING  PROVIDER #:        KEN Bronson CNP          QUERY TEXT:    Pt admitted with colon cancer. Noted documentation of severe malnutrition per   Dietary consultant. If possible, please document in progress notes and   discharge summary:    The medical record reflects the following:  Risk Factors: colon cancer, poor PO intake, right colectomy, partial   duodenectomy  Clinical Indicators: mild fat loss, moderate muscle loss, BMI 17, severe PCM   per Dietary, greater than 7.5% weight loss over 3 months  Treatment: Dietary consult, nutritional supplements, tube feeds per J tube,   serial labs, daily weights, supportive care    Thank you,  Misha Torres RN, ROSA [email or web address]  Options provided:  -- Severe malnutrition  confirmed present on admission  -- Severe malnutrition  ruled out  -- Other - I will add my own diagnosis  -- Disagree - Not applicable / Not valid  -- Disagree - Clinically unable to determine / Unknown  -- Refer to Clinical Documentation Reviewer    PROVIDER RESPONSE TEXT:    The diagnosis of severe malnutrition was confirmed as present on admission.     Query created by: Manish Pleitez on 2022 4:21 PM      Electronically signed by:  Nawaf Bronson CNP 2022 5:34 PM

## 2022-05-16 NOTE — PROGRESS NOTES
Comprehensive Nutrition Assessment    Type and Reason for Visit:  Reassess    Nutrition Recommendations/Plan:   1. Continue Enteral feeding Jevity 1.5 @ 40,l/hr x 20 hr + 1 [id number]; 30 mls water q 4 hr or per MD guidance     Malnutrition Assessment:  Malnutrition Status:  Severe malnutrition (05/16/22 1113)    Context:  Acute Illness     Findings of the 6 clinical characteristics of malnutrition:  Energy Intake:  Mild decrease in energy intake (Comment)  Weight Loss:  Greater than 7.5% over 3 months     Body Fat Loss:  Mild body fat loss Orbital   Muscle Mass Loss: Moderate muscle mass loss Clavicles (pectoralis & deltoids),Temples (temporalis)  Fluid Accumulation:  No significant fluid accumulation     Strength:  Not Performed    Nutrition Assessment:    patient remains utritionally compromised AEB hx of colon cancer with physical s/s of sever PCM and currently POD #6 right colectomy, partial duodenectomy, SBO, immanuel, J-tube insertion. She is improving from nutrition stand point as her TF is currenlty at goal which she is tolerating w/o N/V/D and has had 2 formed BMS. At risk for further compromise d/t NPO status &TF dependent; will continue enetral feeding as ordered and monitor tolerance    Nutrition Related Findings:    A & O x 4 ; up an OOD; POD x 6NGT clamped since yesterday; TF infsuing at goal and pt tolerating; formed BM 5/15 & 5/16;  Wound Type: Surgical Incision (+ abdominal incision)       Current Nutrition Intake & Therapies:    Average Meal Intake: NPO  Average Supplements Intake: NPO  ADULT TUBE FEEDING; Jejunostomy; Standard with Fiber; Continuous; 15; Yes; 15; Other (specify); once; 40; 30; Q 4 hours; Protein; one proteinex P2Go once daily via feeding tube  ADULT DIET;  Clear Liquid  Current Tube Feeding (TF) Orders:  · Feeding Route: Jejunostomy  · Formula: Standard with Fiber  · Schedule: Continuous  · Feeding Regimen: Jevity 1.5 with a trophic rate of 15 ml/hr x 20 hours  · Additives/Modulars: Protein (one proteinex P2Go once daily via feeding tube)  · Water Flushes: water flushes of 30 ml every 4 hours for tube patency  · Current TF & Flush Orders Provides: TF to possibly be started today  · Goal TF & Flush Orders Provides: Jevity 1.5 with a goal rate of 40 ml/hr x 20 hours = 800 ml TV, 1200 kcals, 51 g protein, and 608 ml free water + 26 g protein and 104 kcals from one proteinex P2Go once daily via feeding tube (77 g protein and 1304 kcals total) + 30 ml water flushes every 4 hours for tube patency      Anthropometric Measures:  Height: 5' 3\" (160 cm)  Ideal Body Weight (IBW): 115 lbs (52 kg)    Admission Body Weight: 98 lb (44.5 kg) (obtained on 5/10/22; stated weight)  Current Body Weight: 96 lb (43.5 kg), 85.2 % IBW.  Weight Source: Standing Scale  Current BMI (kg/m2): 17  Usual Body Weight: 120 lb (54.4 kg) (Pt reports that was her UBW until March 2022)  % Weight Change (Calculated): -20  Weight Adjustment For: No Adjustment                 BMI Categories: Underweight (BMI less than 18.5)    Estimated Daily Nutrient Needs:  Energy Requirements Based On: Kcal/kg  Weight Used for Energy Requirements: Current (based on a stated weight of 98#)  Energy (kcal/day): 1350 - 1440 kcals based on 30-32 kcals/kg/CBW  Weight Used for Protein Requirements: Current  Protein (g/day): 68 - 77 g protein based on 1.5-1.7 g/kg/CBW  Method Used for Fluid Requirements: 1 ml/kcal  Fluid (ml/day): 1350 - 1440 ml    Nutrition Diagnosis:   · Inadequate oral intake related to inadequate protein-energy intake,altered GI function,altered GI structure,increase demand for energy/nutrients as evidenced by NPO or clear liquid status due to medical condition,poor intake prior to admission,BMI,lab values,GI abnormality,other (comment) (hx of colon cancer with surgery on 5/10/22)      Nutrition Interventions:   Food and/or Nutrient Delivery: Continue NPO,Continue Current Tube Feeding,IV Fluid Delivery  Nutrition Education/Counseling: No recommendation at this time  Coordination of Nutrition Care: Continue to monitor while inpatient  Plan of Care discussed with: Kirsten (surgery CNP)    Goals:  Previous Goal Met: Goal(s) Achieved  Goals: Tolerate nutrition support at goal rate,within 7 days       Nutrition Monitoring and Evaluation:   Behavioral-Environmental Outcomes: None Identified  Food/Nutrient Intake Outcomes: Enteral Nutrition Intake/Tolerance,IVF Intake  Physical Signs/Symptoms Outcomes: Biochemical Data    Discharge Planning:     Too soon to determine     Margie Laughlin, LD  Contact: [phone]

## 2022-05-16 NOTE — PROGRESS NOTES
Occupational Therapy Daily Treatment Note    Unit: 2 [place]  Date:  5/16/2022  Patient Name:    Gregory Pérez  Admitting diagnosis:  Colon cancer [facility]) [C18.9]  Admit Date:  5/10/2022  Precautions/Restrictions:  IV and abdominal incision, drain, NG tube, PEG        Discharge Recommendations: Home with 24/7 assist and home therapy  DME needs for discharge: Needs Met       Therapy recommendations for staff:   Stand By Assist with use of No AD for all transfers and ambulation within room  within halls    AM-PAC Score: AM-PAC Inpatient Daily Activity Raw Score: 21  Home Health S4 Level: Level 1- Standard       Treatment Time:  13:00-13:28  Treatment number:  2    Total Treatment Time:   28 minutes    History of Present Illness: Pt admitted for colon surgery for cancer    Subjective:  Pt agreeable to walking in hallway     Pain   Yes  Rating: moderate  Location:abdomen   Pain Medicine Status: No request made      Bed Mobility:   Supine to Sit:  Independent  Sit to Supine:  Independent  Rolling:           Independent  Scooting:        Independent    Transfer Training:   Sit to stand:   Supervision  Stand to sit:  Supervision  Bed to Chair:  SBA  Bed to BSC:   N/A  Standard toilet:   SBA   Functional mobility:     SBA with assistance for IV/PEG tube pole, pt walked around hallway 2x's with 1 rest break in between walks       Activity Tolerance   Pt completed therapy session with Pain noted with activity    Balance  Sitting Balance :     SBA  Standing Balance   SBA    ADL Training:   Upper body dressing:  N/A  Upper body bathing:  N/A  Lower body dressing:  Supervision  Lower body bathing:  N/A  Toileting:   N/A  Grooming/Hygiene:  N/A  Feeding :   Independent    Therapeutic Exercise:   N/A    Patient Education:   Role of OT  Energy conservation techniques    Positioning Needs: In bed, call light and needs in reach. Family Present:  Yes friend     Assessment:     GOALS  1).  Bed to toilet/BSC: Supervision     To be met in 5 Visits:  1). Supine to/from Sit:             Independent  2). Upper Body Bathing:         Independent  3). Lower Body Bathing:         Supervision  4). Upper Body Dressing:       Independent  5). Lower Body Dressing:       Independent  6).  Pt to demonstrate UE exs x 15 reps with minimal cues      Plan: cont with POC      Allen Emmanuel OTR/L #[id number]        If patient discharges from this facility prior to next visit, this note will serve as the Discharge Summary

## 2022-05-17 PROCEDURE — 1200000000 HC SEMI PRIVATE

## 2022-05-17 PROCEDURE — 99024 POSTOP FOLLOW-UP VISIT: CPT | Performed by: NURSE PRACTITIONER

## 2022-05-17 PROCEDURE — 2580000003 HC RX 258: Performed by: NURSE PRACTITIONER

## 2022-05-17 PROCEDURE — 6360000002 HC RX W HCPCS: Performed by: SURGERY

## 2022-05-17 PROCEDURE — 97110 THERAPEUTIC EXERCISES: CPT

## 2022-05-17 PROCEDURE — 2580000003 HC RX 258: Performed by: SURGERY

## 2022-05-17 PROCEDURE — 97535 SELF CARE MNGMENT TRAINING: CPT

## 2022-05-17 PROCEDURE — 6370000000 HC RX 637 (ALT 250 FOR IP): Performed by: NURSE PRACTITIONER

## 2022-05-17 PROCEDURE — 2500000003 HC RX 250 WO HCPCS: Performed by: SURGERY

## 2022-05-17 PROCEDURE — 6360000002 HC RX W HCPCS: Performed by: NURSE PRACTITIONER

## 2022-05-17 RX ORDER — OXYCODONE HYDROCHLORIDE 5 MG/1
10 TABLET ORAL EVERY 4 HOURS PRN
Status: DISCONTINUED | OUTPATIENT
Start: 2022-05-17 | End: 2022-05-20 | Stop reason: HOSPADM

## 2022-05-17 RX ORDER — OXYCODONE HYDROCHLORIDE 5 MG/1
5 TABLET ORAL EVERY 4 HOURS PRN
Status: DISCONTINUED | OUTPATIENT
Start: 2022-05-17 | End: 2022-05-20 | Stop reason: HOSPADM

## 2022-05-17 RX ADMIN — FAMOTIDINE 20 MG: 10 INJECTION INTRAVENOUS at 20:14

## 2022-05-17 RX ADMIN — HYDROMORPHONE HYDROCHLORIDE 0.5 MG: 1 INJECTION, SOLUTION INTRAMUSCULAR; INTRAVENOUS; SUBCUTANEOUS at 00:14

## 2022-05-17 RX ADMIN — OXYCODONE 5 MG: 5 TABLET ORAL at 15:58

## 2022-05-17 RX ADMIN — SODIUM CHLORIDE, SODIUM LACTATE, POTASSIUM CHLORIDE, CALCIUM CHLORIDE AND DEXTROSE MONOHYDRATE: 5; 600; 310; 30; 20 INJECTION, SOLUTION INTRAVENOUS at 12:51

## 2022-05-17 RX ADMIN — HYDROMORPHONE HYDROCHLORIDE 0.5 MG: 1 INJECTION, SOLUTION INTRAMUSCULAR; INTRAVENOUS; SUBCUTANEOUS at 04:15

## 2022-05-17 RX ADMIN — HYDROMORPHONE HYDROCHLORIDE 0.5 MG: 1 INJECTION, SOLUTION INTRAMUSCULAR; INTRAVENOUS; SUBCUTANEOUS at 07:29

## 2022-05-17 RX ADMIN — FAMOTIDINE 20 MG: 10 INJECTION INTRAVENOUS at 09:36

## 2022-05-17 RX ADMIN — ENOXAPARIN SODIUM 30 MG: 100 INJECTION SUBCUTANEOUS at 05:52

## 2022-05-17 RX ADMIN — OXYCODONE 10 MG: 5 TABLET ORAL at 20:15

## 2022-05-17 RX ADMIN — HYDROMORPHONE HYDROCHLORIDE 0.5 MG: 1 INJECTION, SOLUTION INTRAMUSCULAR; INTRAVENOUS; SUBCUTANEOUS at 11:42

## 2022-05-17 ASSESSMENT — PAIN SCALES - GENERAL
PAINLEVEL_OUTOF10: 8
PAINLEVEL_OUTOF10: 10
PAINLEVEL_OUTOF10: 9
PAINLEVEL_OUTOF10: 8

## 2022-05-17 NOTE — PROGRESS NOTES
1528  Last data filed at 5/17/2022 0915  Gross per 24 hour   Intake 3749.97 ml   Output 90 ml   Net 3659.97 ml     URINARY CATHETER OUTPUT (Andrade):     GENERAL: alert, cooperative, no distress  I/O last 3 completed shifts: In: 6156 [P.O.:240;  I.V.:3480]  Out: 690 [Emesis/NG output:500; Drains:190]  I/O this shift:  In: 30 [NG/GT:30]  Out: -     LABS  Recent Labs     05/15/22  0616   WBC 10.1   HGB 8.5*   HCT 26.4*         K 3.6      CO2 30   BUN 15   CREATININE <0.5*   CALCIUM 8.3     CBC with Differential:    Lab Results   Component Value Date    WBC 10.1 05/15/2022    RBC 3.12 05/15/2022    HGB 8.5 05/15/2022    HCT 26.4 05/15/2022     05/15/2022    MCV 84.8 05/15/2022    MCH 27.4 05/15/2022    MCHC 32.3 05/15/2022    RDW 15.2 05/15/2022    LYMPHOPCT 13.0 05/15/2022    MONOPCT 8.3 05/15/2022    BASOPCT 0.3 05/15/2022    MONOSABS 0.8 05/15/2022    LYMPHSABS 1.3 05/15/2022    EOSABS 0.1 05/15/2022    BASOSABS 0.0 05/15/2022     CMP:    Lab Results   Component Value Date     05/15/2022    K 3.6 05/15/2022    K 4.0 05/10/2022     05/15/2022    CO2 30 05/15/2022    BUN 15 05/15/2022    CREATININE <0.5 05/15/2022    GFRAA >60 05/15/2022    LABGLOM >60 05/15/2022    GLUCOSE 129 05/15/2022    PROT 5.8 05/11/2022    LABALBU 2.7 05/11/2022    CALCIUM 8.3 05/15/2022    BILITOT 0.3 05/11/2022    ALKPHOS 120 05/11/2022    AST 27 05/11/2022    ALT 15 05/11/2022         GUY Mckinney - CNP  Electronically signed 5/17/2022 at 3:28 PM

## 2022-05-17 NOTE — PROGRESS NOTES
prn dilaudid given this shift. pts j tube was clogged had to use the clot buster on her j tube, it was effective. jennifer drain has been functioning properly but has not had much drainage. Pt is finally resting comfortably. Pt has not had bowel movements this shift but is passing gas.

## 2022-05-17 NOTE — PLAN OF CARE
Problem: Discharge Planning  Goal: Discharge to home or other facility with appropriate resources  Outcome: Progressing     Problem: Pain  Goal: Verbalizes/displays adequate comfort level or baseline comfort level  Outcome: Progressing     Problem: Safety - Adult  Goal: Free from fall injury  Outcome: Progressing     Problem: ABCDS Injury Assessment  Goal: Absence of physical injury  Outcome: Progressing     Problem: Skin/Tissue Integrity  Goal: Absence of new skin breakdown  Description: 1. Monitor for areas of redness and/or skin breakdown  2. Assess vascular access sites hourly  3. Every 4-6 hours minimum:  Change oxygen saturation probe site  4. Every 4-6 hours:  If on nasal continuous positive airway pressure, respiratory therapy assess nares and determine need for appliance change or resting period.   Outcome: Progressing     Problem: Nutrition Deficit:  Goal: Optimize nutritional status  Outcome: Progressing  Flowsheets (Taken 5/16/2022 1106 by Winfield Gosselin, ERNIE, LD)  Nutrient intake appropriate for improving, restoring, or maintaining nutritional needs: Recommend, monitor, and adjust tube feedings and TPN/PPN based on assessed needs

## 2022-05-17 NOTE — PLAN OF CARE
Problem: Pain  Goal: Verbalizes/displays adequate comfort level or baseline comfort level  5/17/2022 1136 by Madisyn Tom RN  Outcome: Progressing  5/16/2022 2304 by Yocasta Fernandez RN  Outcome: Progressing     Problem: Skin/Tissue Integrity  Goal: Absence of new skin breakdown  Description: 1. Monitor for areas of redness and/or skin breakdown  2. Assess vascular access sites hourly  3. Every 4-6 hours minimum:  Change oxygen saturation probe site  4. Every 4-6 hours:  If on nasal continuous positive airway pressure, respiratory therapy assess nares and determine need for appliance change or resting period.   5/17/2022 1136 by Madisyn Tom RN  Outcome: Progressing  5/16/2022 2304 by Yocasta Fernandez RN  Outcome: Progressing     Problem: Nutrition Deficit:  Goal: Optimize nutritional status  5/17/2022 1136 by Madisyn Tom RN  Outcome: Progressing  5/16/2022 2304 by Yocasta Fernandez RN  Outcome: Progressing

## 2022-05-17 NOTE — PROGRESS NOTES
Occupational Therapy Daily Treatment Note    Unit: 2 [place]  Date:  5/17/2022  Patient Name:    Azul Salcedo  Admitting diagnosis:  Colon cancer [facility]) [C18.9]  Admit Date:  5/10/2022  Precautions/Restrictions:  IV and abdominal incision, drain,  PEG        Discharge Recommendations: Home with 24/7 assist and home therapy  DME needs for discharge: Needs Met       Therapy recommendations for staff:   Stand By Assist with use of No AD for all transfers and ambulation within room  within halls    AM-PAC Score: AM-PAC Inpatient Daily Activity Raw Score: 21  Home Health S4 Level: Level 1- Standard       Treatment Time:  9628-9655  Treatment number:  3   Total Treatment Time:  16  minutes    History of Present Illness: Pt admitted for colon surgery for cancer. Subjective:  Pt agreeable to OT treatment today. Was found supine in bed and asked for treatment to end in bed. Reports chair is not comfortable.      Pain   Yes  Rating: moderate  Location:abdomen   Pain Medicine Status: No request made      Bed Mobility:   Supine to Sit:  Independent with HOB raised  Sit to Supine:  Independent  Rolling:           Independent  Scooting:        Independent    Transfer Training:   Sit to stand:   Supervision  Stand to sit:  Supervision  Bed to Chair:  NT  Bed to [facility]:   N/A  Standard toilet:   IND  Functional mobility:     SBA with pt holding onto IV/PEG tube pole, with pt ambulating to/from bathroom    Activity Tolerance   Pt completed therapy session with Pain noted with activity    Balance  Sitting Balance :     IND static  Standing Balance   SBA    ADL Training: declined-already washed up today  Upper body dressing:  N/A  Upper body bathing:  N/A  Lower body dressing:  N/A  Lower body bathing:  N/A  Toileting:   N/A  Grooming/Hygiene:  N/A  Feeding :   N/A    Therapeutic Exercise:   Shoulder flex/ext:  x15  Bench press: x15  Shoulder abd/add: x15  Bicep curls: x15  Horizontal abd/add: x15     Patient Education:   Role of OT  Energy conservation techniques    Positioning Needs: In bed, call light and needs in reach. Family Present:  Yes- friend present for beginning of session. Assessment: Pt tolerated treatment session well. Pt should continue to benefit from skilled OT tx to maximize independence so that she may eventually return home with the least amount of assistance. GOALS  1). Bed to toilet/BSC: Supervision     To be met in 5 Visits:  1). Supine to/from Sit:             Independent  2). Upper Body Bathing:         Independent  3). Lower Body Bathing:         Supervision  4). Upper Body Dressing:       Independent  5). Lower Body Dressing:       Independent  6).  Pt to demonstrate UE exs x 15 reps with minimal cues      Plan: cont with [facility] MS, OTR/L  #[id number]          If patient discharges from this facility prior to next visit, this note will serve as the Discharge Summary

## 2022-05-17 NOTE — FLOWSHEET NOTE
05/16/22 2104   Vital Signs   Temp 97.5 °F (36.4 °C)   Temp Source Oral   Pulse 95   Heart Rate Source Monitor   Resp 18   BP (!) 153/77   BP Location Right upper arm   MAP (Calculated) 102.33   Patient Position Semi fowlers   Level of Consciousness Alert (0)   MEWS Score 1   Pain Assessment   Pain Assessment 0-10   Pain Level 8   Pain Location Abdomen   Opioid-Induced Sedation   POSS Score 1   Oxygen Therapy   SpO2 90 %   O2 Device None (Room air)   HS assessment completed, see flow sheet. Pt is alert and oriented. BP slightly elevated, no s/s of distress noted or voiced. Respirations are even & easy. complaints of pain voiced, PRN pain medication given and effective. pts abdominal incision is clean, dry , intact, and well approximated. GLENN drain in place, and fuctioning properly. Pt denies needs at this time. SR up x 2, and bed in low position. Call light is within reach. Bedside Mobility Assessment Tool (BMAT):     Assessment Level 1- Sit and Shake    1. From a semi-reclined position, ask patient to sit up and rotate to a seated position at the side of the bed. Can use the bedrail. 2. Ask patient to reach out and grab your hand and shake making sure patient reaches across his/her midline. Pass- Patient is able to come to a seated position, maintain core strength. Maintains seated balance while reaching across midline. Move on to Assessment Level 2. Assessment Level 2- Stretch and Point   1. With patient in seated position at the side of the bed, have patient place both feet on the floor (or stool) with knees no higher than hips. 2. Ask patient to stretch one leg and straighten the knee, then bend the ankle/flex and point the toes. If appropriate, repeat with the other leg. Pass- Patient is able to demonstrate appropriate quad strength on intended weight bearing limb(s). Move onto Assessment Level 3. Assessment Level 3- Stand   1.  Ask patient to elevate off the bed or chair (seated to standing) using an assistive device (cane, bedrail). 2. Patient should be able to raise buttocks off be and hold for a count of five. May repeat once. Pass- Patient maintains standing stability for at least 5 seconds, proceed to assessment level 4. Assessment Level 4- Walk   1. Ask patient to march in place at bedside. 2. Then ask patient to advance step and return each foot. Some medical conditions may render a patient from stepping backwards, use your best clinical judgement. Fail- Patient not able to complete tasks OR requires use of assistive device. Patient is MOBILITY LEVEL 3. Mobility Level- 3    Patient is not able to demonstrate the ability to move from a reclining position to an upright position within the recliner due to wekaness.

## 2022-05-17 NOTE — CARE COORDINATION
Spoke with Chaitanya BILL. Per CM, plan to dc home 5/18 with new TF. Per CM, pt declines HC. Telephone call to Deandre White with [facility]. [facility] aware of pt plan to dc home tomorrow 5/18. Deandre White will contact pt to set up for bedside TF teaching at [facility] prior to discharge.       Electronically signed by Eloy Bourgeois RN on 5/17/2022 at 3:43 PM

## 2022-05-17 NOTE — PLAN OF CARE
Problem: Discharge Planning  Goal: Discharge to home or other facility with appropriate resources  5/16/2022 2304 by Darvin Kim RN  Outcome: Progressing  5/16/2022 2025 by Melba Martin RN  Outcome: Progressing     Problem: Pain  Goal: Verbalizes/displays adequate comfort level or baseline comfort level  5/16/2022 2304 by Darvin Kim RN  Outcome: Progressing  5/16/2022 2025 by Melba Martin RN  Outcome: Progressing     Problem: Safety - Adult  Goal: Free from fall injury  5/16/2022 2304 by Darvin Kim RN  Outcome: Progressing  5/16/2022 2025 by Melba Martin RN  Outcome: Progressing     Problem: ABCDS Injury Assessment  Goal: Absence of physical injury  5/16/2022 2304 by Darvin Kim RN  Outcome: Progressing  5/16/2022 2025 by Melba Martin RN  Outcome: Progressing     Problem: Skin/Tissue Integrity  Goal: Absence of new skin breakdown  Description: 1. Monitor for areas of redness and/or skin breakdown  2. Assess vascular access sites hourly  3. Every 4-6 hours minimum:  Change oxygen saturation probe site  4. Every 4-6 hours:  If on nasal continuous positive airway pressure, respiratory therapy assess nares and determine need for appliance change or resting period.   5/16/2022 2304 by Darvin Kim RN  Outcome: Progressing  5/16/2022 2025 by Melba Martin RN  Outcome: Progressing     Problem: Nutrition Deficit:  Goal: Optimize nutritional status  5/16/2022 2304 by Darvin Kim RN  Outcome: Progressing  5/16/2022 2025 by Melba Martin RN  Outcome: Progressing  Flowsheets (Taken 5/16/2022 1106 by Armando Ashby RD, LD)  Nutrient intake appropriate for improving, restoring, or maintaining nutritional needs: Recommend, monitor, and adjust tube feedings and TPN/PPN based on assessed needs

## 2022-05-18 ENCOUNTER — APPOINTMENT (OUTPATIENT)
Dept: CT IMAGING | Age: 62
DRG: 326 | End: 2022-05-18
Attending: SURGERY
Payer: COMMERCIAL

## 2022-05-18 LAB
ANION GAP SERPL CALCULATED.3IONS-SCNC: 4 MMOL/L (ref 3–16)
BASOPHILS ABSOLUTE: 0 K/UL (ref 0–0.2)
BASOPHILS RELATIVE PERCENT: 0.3 %
BUN BLDV-MCNC: 10 MG/DL (ref 7–20)
CALCIUM SERPL-MCNC: 8.1 MG/DL (ref 8.3–10.6)
CHLORIDE BLD-SCNC: 96 MMOL/L (ref 99–110)
CO2: 29 MMOL/L (ref 21–32)
CREAT SERPL-MCNC: <0.5 MG/DL (ref 0.6–1.2)
EOSINOPHILS ABSOLUTE: 0.1 K/UL (ref 0–0.6)
EOSINOPHILS RELATIVE PERCENT: 0.7 %
GFR AFRICAN AMERICAN: >60
GFR NON-AFRICAN AMERICAN: >60
GLUCOSE BLD-MCNC: 100 MG/DL (ref 70–99)
HCT VFR BLD CALC: 25 % (ref 36–48)
HEMOGLOBIN: 8.1 G/DL (ref 12–16)
LYMPHOCYTES ABSOLUTE: 1.6 K/UL (ref 1–5.1)
LYMPHOCYTES RELATIVE PERCENT: 11.8 %
MCH RBC QN AUTO: 27 PG (ref 26–34)
MCHC RBC AUTO-ENTMCNC: 32.3 G/DL (ref 31–36)
MCV RBC AUTO: 83.8 FL (ref 80–100)
MONOCYTES ABSOLUTE: 1.2 K/UL (ref 0–1.3)
MONOCYTES RELATIVE PERCENT: 8.4 %
NEUTROPHILS ABSOLUTE: 11 K/UL (ref 1.7–7.7)
NEUTROPHILS RELATIVE PERCENT: 78.8 %
PDW BLD-RTO: 15.4 % (ref 12.4–15.4)
PLATELET # BLD: 327 K/UL (ref 135–450)
PMV BLD AUTO: 6.5 FL (ref 5–10.5)
POTASSIUM REFLEX MAGNESIUM: 4.8 MMOL/L (ref 3.5–5.1)
RBC # BLD: 2.98 M/UL (ref 4–5.2)
SODIUM BLD-SCNC: 129 MMOL/L (ref 136–145)
WBC # BLD: 14 K/UL (ref 4–11)

## 2022-05-18 PROCEDURE — 6370000000 HC RX 637 (ALT 250 FOR IP): Performed by: NURSE PRACTITIONER

## 2022-05-18 PROCEDURE — 99024 POSTOP FOLLOW-UP VISIT: CPT | Performed by: NURSE PRACTITIONER

## 2022-05-18 PROCEDURE — 2580000003 HC RX 258: Performed by: NURSE PRACTITIONER

## 2022-05-18 PROCEDURE — 1200000000 HC SEMI PRIVATE

## 2022-05-18 PROCEDURE — 6360000004 HC RX CONTRAST MEDICATION: Performed by: NURSE PRACTITIONER

## 2022-05-18 PROCEDURE — 80048 BASIC METABOLIC PNL TOTAL CA: CPT

## 2022-05-18 PROCEDURE — 6360000004 HC RX CONTRAST MEDICATION: Performed by: SURGERY

## 2022-05-18 PROCEDURE — 36415 COLL VENOUS BLD VENIPUNCTURE: CPT

## 2022-05-18 PROCEDURE — 6370000000 HC RX 637 (ALT 250 FOR IP): Performed by: SURGERY

## 2022-05-18 PROCEDURE — 2500000003 HC RX 250 WO HCPCS: Performed by: SURGERY

## 2022-05-18 PROCEDURE — 74177 CT ABD & PELVIS W/CONTRAST: CPT

## 2022-05-18 PROCEDURE — 2580000003 HC RX 258: Performed by: SURGERY

## 2022-05-18 PROCEDURE — 85025 COMPLETE CBC W/AUTO DIFF WBC: CPT

## 2022-05-18 PROCEDURE — 6360000002 HC RX W HCPCS: Performed by: SURGERY

## 2022-05-18 RX ADMIN — IOHEXOL 25 ML: 240 INJECTION, SOLUTION INTRATHECAL; INTRAVASCULAR; INTRAVENOUS; ORAL at 15:08

## 2022-05-18 RX ADMIN — FAMOTIDINE 20 MG: 10 INJECTION INTRAVENOUS at 09:28

## 2022-05-18 RX ADMIN — SODIUM CHLORIDE, SODIUM LACTATE, POTASSIUM CHLORIDE, CALCIUM CHLORIDE AND DEXTROSE MONOHYDRATE: 5; 600; 310; 30; 20 INJECTION, SOLUTION INTRAVENOUS at 05:16

## 2022-05-18 RX ADMIN — OXYCODONE 10 MG: 5 TABLET ORAL at 15:39

## 2022-05-18 RX ADMIN — OXYCODONE 10 MG: 5 TABLET ORAL at 07:25

## 2022-05-18 RX ADMIN — OXYCODONE 10 MG: 5 TABLET ORAL at 03:25

## 2022-05-18 RX ADMIN — FAMOTIDINE 20 MG: 10 INJECTION INTRAVENOUS at 21:35

## 2022-05-18 RX ADMIN — ACETAMINOPHEN 650 MG: 650 SUPPOSITORY RECTAL at 21:35

## 2022-05-18 RX ADMIN — MEROPENEM 1000 MG: 1 INJECTION, POWDER, FOR SOLUTION INTRAVENOUS at 22:40

## 2022-05-18 RX ADMIN — ENOXAPARIN SODIUM 30 MG: 100 INJECTION SUBCUTANEOUS at 05:30

## 2022-05-18 RX ADMIN — OXYCODONE 10 MG: 5 TABLET ORAL at 11:32

## 2022-05-18 RX ADMIN — IOPAMIDOL 75 ML: 755 INJECTION, SOLUTION INTRAVENOUS at 16:26

## 2022-05-18 ASSESSMENT — PAIN SCALES - GENERAL
PAINLEVEL_OUTOF10: 0
PAINLEVEL_OUTOF10: 9
PAINLEVEL_OUTOF10: 10
PAINLEVEL_OUTOF10: 7
PAINLEVEL_OUTOF10: 5
PAINLEVEL_OUTOF10: 10
PAINLEVEL_OUTOF10: 0

## 2022-05-18 ASSESSMENT — PAIN DESCRIPTION - LOCATION
LOCATION: ABDOMEN
LOCATION: ABDOMEN

## 2022-05-18 ASSESSMENT — PAIN DESCRIPTION - DESCRIPTORS
DESCRIPTORS: SORE;ACHING
DESCRIPTORS: SORE

## 2022-05-18 ASSESSMENT — PAIN - FUNCTIONAL ASSESSMENT: PAIN_FUNCTIONAL_ASSESSMENT: ACTIVITIES ARE NOT PREVENTED

## 2022-05-18 ASSESSMENT — PAIN DESCRIPTION - ORIENTATION
ORIENTATION: RIGHT;LOWER
ORIENTATION: RIGHT;LOWER

## 2022-05-18 NOTE — PLAN OF CARE
Problem: Discharge Planning  Goal: Discharge to home or other facility with appropriate resources  5/17/2022 2233 by Zonia Cyr RN  Outcome: Progressing  5/17/2022 1136 by Tony Kim RN  Outcome: Progressing     Problem: Pain  Goal: Verbalizes/displays adequate comfort level or baseline comfort level  5/17/2022 2233 by Zonia Cyr RN  Outcome: Progressing  5/17/2022 1136 by Tony Kim RN  Outcome: Progressing     Problem: Safety - Adult  Goal: Free from fall injury  5/17/2022 2233 by Zonia Cyr RN  Outcome: Progressing  5/17/2022 1136 by Tony Kim RN  Outcome: Progressing  Flowsheets (Taken 5/17/2022 1134)  Free From Fall Injury: Based on caregiver fall risk screen, instruct family/caregiver to ask for assistance with transferring infant if caregiver noted to have fall risk factors     Problem: ABCDS Injury Assessment  Goal: Absence of physical injury  5/17/2022 2233 by Zonia Cyr RN  Outcome: Progressing  5/17/2022 1136 by Tony Kim RN  Outcome: Progressing  Flowsheets (Taken 5/17/2022 1134)  Absence of Physical Injury: Implement safety measures based on patient assessment     Problem: Skin/Tissue Integrity  Goal: Absence of new skin breakdown  Description: 1. Monitor for areas of redness and/or skin breakdown  2. Assess vascular access sites hourly  3. Every 4-6 hours minimum:  Change oxygen saturation probe site  4. Every 4-6 hours:  If on nasal continuous positive airway pressure, respiratory therapy assess nares and determine need for appliance change or resting period.   5/17/2022 2233 by Zonia Cyr RN  Outcome: Progressing  5/17/2022 1136 by Tony Kim RN  Outcome: Progressing     Problem: Nutrition Deficit:  Goal: Optimize nutritional status  5/17/2022 2233 by Zonia Cyr RN  Outcome: Progressing  5/17/2022 1136 by Tony Kim RN  Outcome: Progressing

## 2022-05-18 NOTE — CARE COORDINATION
INTERDISCIPLINARY PLAN OF CARE CONFERENCE    Date/Time: 5/18/2022 3:41 PM  Completed by: Aruna Morse RN, Case Management      Patient Name:  Polina Salcido  YOB: 1960  Admitting Diagnosis: Colon cancer [facility]) [C18.9]     Admit Date/Time:  5/10/2022  6:10 AM    Chart reviewed. Interdisciplinary team contacted or reviewed plan related to patient progress and discharge plans. Disciplines included Case Management, Nursing, and Dietitian. Current Status: Stable  PT/OT recommendation for discharge plan of care:  Home PRN assist     Expected D/C Disposition:  Home  Confirmed plan with patient  Yes   Met with: patient  Discharge Plan Comments:  Order for dc noted. Spoke with pt who cont plan to return home at dc. Remains agreeable to  Rico  and chooses [facility]. Joey Hodgson from [facility] care in to see pt and do home J tube feed teaching. Noted pt is 100% covered for home Tube feeds. Will follow for additional dc needs.       Home O2 in place on admit: No  Pt informed of need to bring portable home O2 tank on day of discharge for nursing to connect prior to leaving:  Not Indicated  Verbalized agreement/Understanding:  Not Indicated

## 2022-05-18 NOTE — PLAN OF CARE
Problem: Pain  Goal: Verbalizes/displays adequate comfort level or baseline comfort level  5/18/2022 0922 by Kelechi Silva RN  Outcome: Progressing  5/17/2022 2233 by Kulwinder Johnson RN  Outcome: Progressing     Problem: Safety - Adult  Goal: Free from fall injury  5/18/2022 0922 by Kelechi Silva RN  Outcome: Progressing  5/17/2022 2233 by Kulwinder Johnson RN  Outcome: Progressing     Problem: ABCDS Injury Assessment  Goal: Absence of physical injury  5/17/2022 2233 by Kulwinder Johnson RN  Outcome: Progressing

## 2022-05-18 NOTE — FLOWSHEET NOTE
05/17/22 2015   Vital Signs   Temp 98 °F (36.7 °C)   Temp Source Oral   Pulse 101   Heart Rate Source Monitor   Resp 17   BP (!) 145/70   BP Location Right Arm   MAP (Calculated) 95   Patient Position Semi fowlers   Level of Consciousness Alert (0)   MEWS Score 2   Pain Assessment   Pain Assessment 0-10   Pain Level 8   Opioid-Induced Sedation   POSS Score 1   Oxygen Therapy   SpO2 94 %   O2 Device None (Room air)   HS assessment completed, see flow sheet. Pt is alert and oriented. BP slightly elevated, no s/s of distress noted or voiced. Respirations are even & easy. Complaints of pain and discomfort in abdominal area voiced, prn oxycodone given and effective. Abdominal incision noted, staples in place, incision is well approximated, clean and dry. jennifer drain functioning properly and suctioned. jtube site is clean dry and dressing is intact, jevity 1.5 running and pt is tolerating. pts NG tube was taken out this shift, bs noted x4. No distention noted. Pt is tolerating diet. Pt denies needs at this time. SR up x 2, and bed in low position. Call light is within reach. Bedside Mobility Assessment Tool (BMAT):     Assessment Level 1- Sit and Shake    1. From a semi-reclined position, ask patient to sit up and rotate to a seated position at the side of the bed. Can use the bedrail. 2. Ask patient to reach out and grab your hand and shake making sure patient reaches across his/her midline. Pass- Patient is able to come to a seated position, maintain core strength. Maintains seated balance while reaching across midline. Move on to Assessment Level 2. Assessment Level 2- Stretch and Point   1. With patient in seated position at the side of the bed, have patient place both feet on the floor (or stool) with knees no higher than hips. 2. Ask patient to stretch one leg and straighten the knee, then bend the ankle/flex and point the toes. If appropriate, repeat with the other leg.    Pass- Patient is able to

## 2022-05-18 NOTE — PROGRESS NOTES
pt has got prn pain meds x2. NG tube removed during day shift, pt tolerating diet well, no distention noted. GLENN drain in place, very little drainage, jtube In place and jevity running cont.

## 2022-05-18 NOTE — PROGRESS NOTES
The writer of this note accidentally changed the information in the night shift nurses column, mistaking the time, and thinking the writer documented incorrectly.  Once the error was realized, the information was changed back

## 2022-05-18 NOTE — PROGRESS NOTES
Shift assessment complete - See flow sheet. Medications given - See [facility]          Surgical site/ Dressing, C,D,I. Staples- MATIAS    G-tube checked for residual, no residual noted, pt tolerating well. Tube feed at goal          Patient with no complaints of pain at this time. Patient denies any further needs. Bed alarm is deferred for low/med risk, A/O with steady gait- pivots to [facility]  Call light in reach    Bedside Mobility Assessment Tool (BMAT):     Assessment Level 1- Sit and Shake    1. From a semi-reclined position, ask patient to sit up and rotate to a seated position at the side of the bed. Can use the bedrail. 2. Ask patient to reach out and grab your hand and shake making sure patient reaches across his/her midline. Pass- Patient is able to come to a seated position, maintain core strength. Maintains seated balance while reaching across midline. Move on to Assessment Level 2. Assessment Level 2- Stretch and Point   1. With patient in seated position at the side of the bed, have patient place both feet on the floor (or stool) with knees no higher than hips. 2. Ask patient to stretch one leg and straighten the knee, then bend the ankle/flex and point the toes. If appropriate, repeat with the other leg. Pass- Patient is able to demonstrate appropriate quad strength on intended weight bearing limb(s). Move onto Assessment Level 3. Assessment Level 3- Stand   1. Ask patient to elevate off the bed or chair (seated to standing) using an assistive device (cane, bedrail). 2. Patient should be able to raise buttocks off be and hold for a count of five. May repeat once. Pass- Patient maintains standing stability for at least 5 seconds, proceed to assessment level 4. Assessment Level 4- Walk   1. Ask patient to march in place at bedside. 2. Then ask patient to advance step and return each foot.  Some medical conditions may render a patient from stepping backwards, use your best clinical judgement. Pass- Patient demonstrates balance while shifting weight and ability to step, takes independent steps, does not use assistive device patient is MOBILITY LEVEL 4.       Mobility Level- 4

## 2022-05-18 NOTE — PROGRESS NOTES
General Surgery - Kirsten Barnes, APRN - CNP, CNP  Daily Progress Note    Pt Name: Singh Bond  Medical Record Number: [id number]  Date of Birth 1960   Today's Date: 5/18/2022    ASSESSMENT  1. POD #8 s/p right colectomy, partial duodenectomy, SBO, immanuel, J-tube insertion  2. ABD: soft, + incisional tenderness, + right mid abd tenderness at GLENN drain site, GLENN removed: once removed a large amount of purulence evacuated from perc drain site, green/creamy/thick, foul smelling, J-tube in place, midline staples look good, no N, no V, + flatus, + BM  3. H&H stable 8.1/25  4. GLENN: 20 serous   5. VSS  6. UO good  7. Clarifying allergy: pt states codeine makes nauseated and vomit, has not taken oxycodone on the past.   8. Pt states \"I felt pretty good until I woke with this right sided belly pain, the pain pills helped. \"     PLAN  1. NPO  2. TF: Hold  3. Pepcid IVP  4. DVT proph: Lovenox  5. PT/OT  6. IVF at 50 ml/hr  7. Continue strict I&Os  8. Pain control  9. Up to chair/ambulate as tolerated  10. IS q 1 hour while awake  11. Pt is POD #8 : She looks OK but, given purulence after drain removal will get CT abd and pelvis to evaluate intraabdominal space. Divine Fredericko has worsened some from yesterday. Pain is controlled. She has no  nausea and no vomiting. She has passed flatus and has had a bowel movement. She is tolerating full liquids and TF via her J-tube. Current activity is up with assistance    OBJECTIVE  VITALS:  height is 5' 3\" (1.6 m) and weight is 98 lb (44.5 kg). Her oral temperature is 98.5 °F (36.9 °C). Her blood pressure is 112/63 and her pulse is 97. Her respiration is 17 and oxygen saturation is 91%.    VITALS:  /63   Pulse 97   Temp 98.5 °F (36.9 °C) (Oral)   Resp 17   Ht 5' 3\" (1.6 m)   Wt 98 lb (44.5 kg)   SpO2 91%   BMI 17.36 kg/m²   INTAKE/OUTPUT:      Intake/Output Summary (Last 24 hours) at 5/18/2022 1333  Last data filed at 5/18/2022 0927  Gross per 24 hour Intake 1392.02 ml   Output 420 ml   Net 972.02 ml     URINARY CATHETER OUTPUT (Andrade):     GENERAL: alert, cooperative, no distress  I/O last 3 completed shifts:   In: 1787 [P.O.:360; I.V.:4482; NG/GT:60]  Out: 50 [Drains:50]  I/O this shift:  In: -   Out: 400 [Urine:400]    LABS  Recent Labs     05/18/22  1149   WBC 14.0*   HGB 8.1*   HCT 25.0*      *   K 4.8   CL 96*   CO2 29   BUN 10   CREATININE <0.5*   CALCIUM 8.1*     CBC with Differential:    Lab Results   Component Value Date    WBC 14.0 05/18/2022    RBC 2.98 05/18/2022    HGB 8.1 05/18/2022    HCT 25.0 05/18/2022     05/18/2022    MCV 83.8 05/18/2022    MCH 27.0 05/18/2022    MCHC 32.3 05/18/2022    RDW 15.4 05/18/2022    LYMPHOPCT 11.8 05/18/2022    MONOPCT 8.4 05/18/2022    BASOPCT 0.3 05/18/2022    MONOSABS 1.2 05/18/2022    LYMPHSABS 1.6 05/18/2022    EOSABS 0.1 05/18/2022    BASOSABS 0.0 05/18/2022     CMP:    Lab Results   Component Value Date     05/18/2022    K 4.8 05/18/2022    CL 96 05/18/2022    CO2 29 05/18/2022    BUN 10 05/18/2022    CREATININE <0.5 05/18/2022    GFRAA >60 05/18/2022    LABGLOM >60 05/18/2022    GLUCOSE 100 05/18/2022    PROT 5.8 05/11/2022    LABALBU 2.7 05/11/2022    CALCIUM 8.1 05/18/2022    BILITOT 0.3 05/11/2022    ALKPHOS 120 05/11/2022    AST 27 05/11/2022    ALT 15 05/11/2022         GUY Burleson - CNP  Electronically signed 5/18/2022 at 1:33 PM

## 2022-05-18 NOTE — CARE COORDINATION
LM for [facility] CM informing [facility] is aware of plan for dc home today. Per Ricardo Ross with [facility], attempt to complete bedside teaching prior to discharge for new TF. Pt declines need for HC per CM. Spoke with [facility] CM in follow up. Per CMRicardo with [facility] met with pt this am for bedside teaching. Unclear if pt will need HC at this time. CM to follow up with pt. Pt lives outside of [facility] service area. Liaison to assist with arranging [facility]. if needed. Spoke with [facility] CM. [address] will be servicing pt for [facility]. needs.         Electronically signed by Ghulam Mazariegos RN on 5/18/2022 at 8:33 AM

## 2022-05-18 NOTE — PROGRESS NOTES
Tube feed has been turned off at this time, Kirsten Frame instructed the J tube be flushed well, flush was unsuccessful. Cath block added to J tube and left to dwell. Waiting to talk to MRI to coordinate pt taking contrast for CT.

## 2022-05-18 NOTE — PROGRESS NOTES
Physical and Occupational Therapy Note    Attempted therapy visit this afternoon. Pt declined due to increase in pain and prepping for a CT scan later today. Will attempt tomorrow if time permits.     Feli Dong David 85, OTR/L  #[id number]

## 2022-05-18 NOTE — PROGRESS NOTES
Physical and Occupational Therapy Note    Attempted therapy visit this afternoon. Pt declined due to increase in pain and prepping for a CT scan later today. Will attempt tomorrow if time permits.     Felipe Piñatingronny Dong David 85, OTR/L  #[id number]  Megha Morejon, PT, DPT

## 2022-05-19 LAB
ANION GAP SERPL CALCULATED.3IONS-SCNC: 7 MMOL/L (ref 3–16)
BASOPHILS ABSOLUTE: 0.1 K/UL (ref 0–0.2)
BASOPHILS RELATIVE PERCENT: 0.5 %
BUN BLDV-MCNC: 11 MG/DL (ref 7–20)
CALCIUM SERPL-MCNC: 8.2 MG/DL (ref 8.3–10.6)
CHLORIDE BLD-SCNC: 96 MMOL/L (ref 99–110)
CO2: 29 MMOL/L (ref 21–32)
CREAT SERPL-MCNC: 0.6 MG/DL (ref 0.6–1.2)
EOSINOPHILS ABSOLUTE: 0 K/UL (ref 0–0.6)
EOSINOPHILS RELATIVE PERCENT: 0.4 %
GFR AFRICAN AMERICAN: >60
GFR NON-AFRICAN AMERICAN: >60
GLUCOSE BLD-MCNC: 104 MG/DL (ref 70–99)
GLUCOSE BLD-MCNC: 107 MG/DL (ref 70–99)
GLUCOSE BLD-MCNC: 117 MG/DL (ref 70–99)
GLUCOSE BLD-MCNC: 125 MG/DL (ref 70–99)
GLUCOSE BLD-MCNC: 98 MG/DL (ref 70–99)
HCT VFR BLD CALC: 23.4 % (ref 36–48)
HEMOGLOBIN: 7.6 G/DL (ref 12–16)
LYMPHOCYTES ABSOLUTE: 1.3 K/UL (ref 1–5.1)
LYMPHOCYTES RELATIVE PERCENT: 10 %
MCH RBC QN AUTO: 26.9 PG (ref 26–34)
MCHC RBC AUTO-ENTMCNC: 32.7 G/DL (ref 31–36)
MCV RBC AUTO: 82.2 FL (ref 80–100)
MONOCYTES ABSOLUTE: 1.1 K/UL (ref 0–1.3)
MONOCYTES RELATIVE PERCENT: 8.4 %
NEUTROPHILS ABSOLUTE: 10.6 K/UL (ref 1.7–7.7)
NEUTROPHILS RELATIVE PERCENT: 80.7 %
PDW BLD-RTO: 15.3 % (ref 12.4–15.4)
PERFORMED ON: ABNORMAL
PHOSPHORUS: 4.5 MG/DL (ref 2.5–4.9)
PLATELET # BLD: 324 K/UL (ref 135–450)
PMV BLD AUTO: 6.4 FL (ref 5–10.5)
POTASSIUM SERPL-SCNC: 4.6 MMOL/L (ref 3.5–5.1)
RBC # BLD: 2.84 M/UL (ref 4–5.2)
SODIUM BLD-SCNC: 132 MMOL/L (ref 136–145)
WBC # BLD: 13.1 K/UL (ref 4–11)

## 2022-05-19 PROCEDURE — 1200000000 HC SEMI PRIVATE

## 2022-05-19 PROCEDURE — 6360000002 HC RX W HCPCS: Performed by: SURGERY

## 2022-05-19 PROCEDURE — 80048 BASIC METABOLIC PNL TOTAL CA: CPT

## 2022-05-19 PROCEDURE — 2580000003 HC RX 258: Performed by: SURGERY

## 2022-05-19 PROCEDURE — 2580000003 HC RX 258: Performed by: NURSE PRACTITIONER

## 2022-05-19 PROCEDURE — 2700000000 HC OXYGEN THERAPY PER DAY

## 2022-05-19 PROCEDURE — 99024 POSTOP FOLLOW-UP VISIT: CPT | Performed by: NURSE PRACTITIONER

## 2022-05-19 PROCEDURE — 36415 COLL VENOUS BLD VENIPUNCTURE: CPT

## 2022-05-19 PROCEDURE — 94761 N-INVAS EAR/PLS OXIMETRY MLT: CPT

## 2022-05-19 PROCEDURE — 85025 COMPLETE CBC W/AUTO DIFF WBC: CPT

## 2022-05-19 PROCEDURE — 84100 ASSAY OF PHOSPHORUS: CPT

## 2022-05-19 PROCEDURE — 2500000003 HC RX 250 WO HCPCS: Performed by: SURGERY

## 2022-05-19 PROCEDURE — 6370000000 HC RX 637 (ALT 250 FOR IP): Performed by: NURSE PRACTITIONER

## 2022-05-19 RX ADMIN — FAMOTIDINE 20 MG: 10 INJECTION INTRAVENOUS at 21:08

## 2022-05-19 RX ADMIN — OXYCODONE 5 MG: 5 TABLET ORAL at 18:08

## 2022-05-19 RX ADMIN — MEROPENEM 1000 MG: 1 INJECTION, POWDER, FOR SOLUTION INTRAVENOUS at 21:14

## 2022-05-19 RX ADMIN — SODIUM CHLORIDE, SODIUM LACTATE, POTASSIUM CHLORIDE, CALCIUM CHLORIDE AND DEXTROSE MONOHYDRATE: 5; 600; 310; 30; 20 INJECTION, SOLUTION INTRAVENOUS at 05:00

## 2022-05-19 RX ADMIN — OXYCODONE 5 MG: 5 TABLET ORAL at 13:56

## 2022-05-19 RX ADMIN — OXYCODONE 5 MG: 5 TABLET ORAL at 09:57

## 2022-05-19 RX ADMIN — MEROPENEM 1000 MG: 1 INJECTION, POWDER, FOR SOLUTION INTRAVENOUS at 05:39

## 2022-05-19 RX ADMIN — FAMOTIDINE 20 MG: 10 INJECTION INTRAVENOUS at 09:56

## 2022-05-19 RX ADMIN — ENOXAPARIN SODIUM 30 MG: 100 INJECTION SUBCUTANEOUS at 05:40

## 2022-05-19 RX ADMIN — MEROPENEM 1000 MG: 1 INJECTION, POWDER, FOR SOLUTION INTRAVENOUS at 13:55

## 2022-05-19 RX ADMIN — OXYCODONE 5 MG: 5 TABLET ORAL at 05:06

## 2022-05-19 ASSESSMENT — PAIN SCALES - GENERAL
PAINLEVEL_OUTOF10: 4
PAINLEVEL_OUTOF10: 6
PAINLEVEL_OUTOF10: 5
PAINLEVEL_OUTOF10: 5

## 2022-05-19 ASSESSMENT — PAIN DESCRIPTION - DESCRIPTORS: DESCRIPTORS: ACHING;SORE

## 2022-05-19 ASSESSMENT — PAIN - FUNCTIONAL ASSESSMENT: PAIN_FUNCTIONAL_ASSESSMENT: ACTIVITIES ARE NOT PREVENTED

## 2022-05-19 ASSESSMENT — PAIN DESCRIPTION - ORIENTATION: ORIENTATION: RIGHT;LOWER

## 2022-05-19 ASSESSMENT — PAIN DESCRIPTION - LOCATION: LOCATION: ABDOMEN

## 2022-05-19 NOTE — FLOWSHEET NOTE
05/19/22 0820   Vital Signs   Temp 97.8 °F (36.6 °C)   Temp Source Oral   Pulse 89   Resp 16   BP (!) 93/53   BP Location Left upper arm   MAP (Calculated) 66.33   Patient Position Semi fowlers   Level of Consciousness Alert (0)   MEWS Score 2   Oxygen Therapy   SpO2 98 %   O2 Device Nasal cannula   O2 Flow Rate (L/min) 1 L/min   Shift assessment complete - See flow sheet. Medications given - See [facility]   Patient with  complaints of pain at this time. Patient denies any further needs. Bed alarm is Deferred for low/med risk, A/O with steady gait   Call light in reach  Bedside Mobility Assessment Tool (BMAT):     Assessment Level 1- Sit and Shake    1. From a semi-reclined position, ask patient to sit up and rotate to a seated position at the side of the bed. Can use the bedrail. 2. Ask patient to reach out and grab your hand and shake making sure patient reaches across his/her midline. Pass- Patient is able to come to a seated position, maintain core strength. Maintains seated balance while reaching across midline. Move on to Assessment Level 2. Assessment Level 2- Stretch and Point   1. With patient in seated position at the side of the bed, have patient place both feet on the floor (or stool) with knees no higher than hips. 2. Ask patient to stretch one leg and straighten the knee, then bend the ankle/flex and point the toes. If appropriate, repeat with the other leg. Pass- Patient is able to demonstrate appropriate quad strength on intended weight bearing limb(s). Move onto Assessment Level 3. Assessment Level 3- Stand   1. Ask patient to elevate off the bed or chair (seated to standing) using an assistive device (cane, bedrail). 2. Patient should be able to raise buttocks off be and hold for a count of five. May repeat once. Pass- Patient maintains standing stability for at least 5 seconds, proceed to assessment level 4. Assessment Level 4- Walk   1.  Ask patient to march in place at bedside. 2. Then ask patient to advance step and return each foot. Some medical conditions may render a patient from stepping backwards, use your best clinical judgement. Pass- Patient demonstrates balance while shifting weight and ability to step, takes independent steps, does not use assistive device patient is MOBILITY LEVEL 4.       Mobility Level- 4

## 2022-05-19 NOTE — PROGRESS NOTES
Patient resting, eyes closed, respirations witnessed as e/e. No signs of distress. Bed in lowest position and locked. Call light and bedside table is easy reach.

## 2022-05-19 NOTE — PROGRESS NOTES
J tube was clogged since yesterday. Was able to use the clog zapper to unclog the tube. It took hour and half to get the tube to free flow.

## 2022-05-19 NOTE — PROGRESS NOTES
General Surgery - Kirsten Hodge Pro, APRN - CNP, CNP  Daily Progress Note    Pt Name: Polina Salcido  Medical Record Number: [id number]  Date of Birth 1960   Today's Date: 5/19/2022    ASSESSMENT  1. POD #9 s/p right colectomy, partial duodenectomy, SBO, immanuel, J-tube insertion  2. CT abd and pelvis yesterday: multiple abscesses in the right abd and pelvis: this was reviewed with Dr. Magali Bautista: collections are not big enough for a drain at this time. 3. ABD: soft, + incisional tenderness, right sided tenderness much improved today, still a small amount of purulence from old GLENN drain site, pt states she is having some left sided abdominal pain today (but this is much less severe than the previous right sided pain), J-tube in place, midline staples look good, no N, no V, + flatus, + BM  4. H&H stable 7.6/23.4  5. Leuks 14->13.1   6. VSS  7. UO good  8. Clarifying allergy: pt states codeine makes nauseated and vomit, has not taken oxycodone on the past.   9. Pt states \"I feel a little better today. \"     PLAN  1. Full liquids  2. TF: restart  3. Pepcid IVP  4. DVT proph: Lovenox  5. PT/OT  6. IVF at 50 ml/hr  7. Continue strict I&Os  8. Pain control  9. Merrem  10. Up to chair/ambulate as tolerated  11. IS q 1 hour while awake  12. Labs in the am  13. Pt is POD #9 : Pt looks improved form yesterday: Will plan to treat abscesses with IV antibiotics and restart full liquids/TF. Pending how pt does, will repeat a scan in a few days to see if abscesses have matured and are amendable to Perc drainage in IR. José Luis Hanson has improved  from yesterday. Pain is controlled. She has no  nausea and no vomiting. She has passed flatus and has had a bowel movement. She was NPO yesterday. Current activity is up with assistance    OBJECTIVE  VITALS:  height is 5' 3\" (1.6 m) and weight is 98 lb (44.5 kg). Her oral temperature is 97.8 °F (36.6 °C). Her blood pressure is 93/53 (abnormal) and her pulse is 89.  Her respiration is 16 and oxygen saturation is 98%. VITALS:  BP (!) 93/53   Pulse 89   Temp 97.8 °F (36.6 °C) (Oral)   Resp 16   Ht 5' 3\" (1.6 m)   Wt 98 lb (44.5 kg)   SpO2 98%   BMI 17.36 kg/m²   INTAKE/OUTPUT:      Intake/Output Summary (Last 24 hours) at 5/19/2022 1237  Last data filed at 5/19/2022 0500  Gross per 24 hour   Intake 50 ml   Output --   Net 50 ml     URINARY CATHETER OUTPUT (Andrade):     GENERAL: alert, cooperative, no distress  I/O last 3 completed shifts: In: 7895 [P.O.:410; I.V.:1002; NG/GT:30]  Out: 420 [Urine:400; Drains:20]  No intake/output data recorded.     LABS  Recent Labs     05/19/22  0909   WBC 13.1*   HGB 7.6*   HCT 23.4*      *   K 4.6   CL 96*   CO2 29   BUN 11   CREATININE 0.6   PHOS 4.5   CALCIUM 8.2*     CBC with Differential:    Lab Results   Component Value Date    WBC 13.1 05/19/2022    RBC 2.84 05/19/2022    HGB 7.6 05/19/2022    HCT 23.4 05/19/2022     05/19/2022    MCV 82.2 05/19/2022    MCH 26.9 05/19/2022    MCHC 32.7 05/19/2022    RDW 15.3 05/19/2022    LYMPHOPCT 10.0 05/19/2022    MONOPCT 8.4 05/19/2022    BASOPCT 0.5 05/19/2022    MONOSABS 1.1 05/19/2022    LYMPHSABS 1.3 05/19/2022    EOSABS 0.0 05/19/2022    BASOSABS 0.1 05/19/2022     CMP:    Lab Results   Component Value Date     05/19/2022    K 4.6 05/19/2022    K 4.8 05/18/2022    CL 96 05/19/2022    CO2 29 05/19/2022    BUN 11 05/19/2022    CREATININE 0.6 05/19/2022    GFRAA >60 05/19/2022    LABGLOM >60 05/19/2022    GLUCOSE 98 05/19/2022    PROT 5.8 05/11/2022    LABALBU 2.7 05/11/2022    CALCIUM 8.2 05/19/2022    BILITOT 0.3 05/11/2022    ALKPHOS 120 05/11/2022    AST 27 05/11/2022    ALT 15 05/11/2022         Amber Danna Crigler, APRN - CNP  Electronically signed 5/19/2022 at 12:37 PM

## 2022-05-19 NOTE — PROGRESS NOTES
Shift assessment complete. See doc flow. Nightly medications given see MAR. Patient is A/Ox4. Patient is NPO, can have ice chips and sips of water with meds. Patient has J-tube, clamped. Patient's skin intact w/scattered bruising. Patient with no complaints at this time. Bed in lowest position and locked. Call light and bedside table within easy reach.

## 2022-05-19 NOTE — PLAN OF CARE
Problem: Discharge Planning  Goal: Discharge to home or other facility with appropriate resources  Outcome: Progressing  Flowsheets (Taken 5/18/2022 2128 by Andreas White RN)  Discharge to home or other facility with appropriate resources: Identify barriers to discharge with patient and caregiver     Problem: Pain  Goal: Verbalizes/displays adequate comfort level or baseline comfort level  Outcome: Progressing     Problem: Nutrition Deficit:  Goal: Optimize nutritional status  Outcome: Progressing

## 2022-05-19 NOTE — PROGRESS NOTES
Pt A/O in bed and quiet. Denies any needs at this time. SR up x2, bed in lowest position,   wheels locked,  bed alarm deferred pt low/med risk with steady gait   Call light and bedside table in easy reach.    Wilma Evangelista RN

## 2022-05-20 VITALS
HEART RATE: 107 BPM | BODY MASS INDEX: 18.25 KG/M2 | RESPIRATION RATE: 16 BRPM | DIASTOLIC BLOOD PRESSURE: 58 MMHG | OXYGEN SATURATION: 95 % | HEIGHT: 63 IN | WEIGHT: 103 LBS | SYSTOLIC BLOOD PRESSURE: 109 MMHG | TEMPERATURE: 98.1 F

## 2022-05-20 LAB
ANION GAP SERPL CALCULATED.3IONS-SCNC: 9 MMOL/L (ref 3–16)
BASOPHILS ABSOLUTE: 0.1 K/UL (ref 0–0.2)
BASOPHILS RELATIVE PERCENT: 0.6 %
BUN BLDV-MCNC: 11 MG/DL (ref 7–20)
CALCIUM SERPL-MCNC: 8.6 MG/DL (ref 8.3–10.6)
CHLORIDE BLD-SCNC: 96 MMOL/L (ref 99–110)
CO2: 26 MMOL/L (ref 21–32)
CREAT SERPL-MCNC: 0.7 MG/DL (ref 0.6–1.2)
EOSINOPHILS ABSOLUTE: 0.1 K/UL (ref 0–0.6)
EOSINOPHILS RELATIVE PERCENT: 1 %
GFR AFRICAN AMERICAN: >60
GFR NON-AFRICAN AMERICAN: >60
GLUCOSE BLD-MCNC: 102 MG/DL (ref 70–99)
HCT VFR BLD CALC: 28.2 % (ref 36–48)
HEMOGLOBIN: 8.9 G/DL (ref 12–16)
LYMPHOCYTES ABSOLUTE: 2.1 K/UL (ref 1–5.1)
LYMPHOCYTES RELATIVE PERCENT: 15.6 %
MCH RBC QN AUTO: 26.8 PG (ref 26–34)
MCHC RBC AUTO-ENTMCNC: 31.6 G/DL (ref 31–36)
MCV RBC AUTO: 84.7 FL (ref 80–100)
MONOCYTES ABSOLUTE: 1.2 K/UL (ref 0–1.3)
MONOCYTES RELATIVE PERCENT: 8.6 %
NEUTROPHILS ABSOLUTE: 10 K/UL (ref 1.7–7.7)
NEUTROPHILS RELATIVE PERCENT: 74.2 %
PDW BLD-RTO: 15.7 % (ref 12.4–15.4)
PLATELET # BLD: 454 K/UL (ref 135–450)
PMV BLD AUTO: 7 FL (ref 5–10.5)
POTASSIUM SERPL-SCNC: 4.3 MMOL/L (ref 3.5–5.1)
RBC # BLD: 3.33 M/UL (ref 4–5.2)
SODIUM BLD-SCNC: 131 MMOL/L (ref 136–145)
WBC # BLD: 13.5 K/UL (ref 4–11)

## 2022-05-20 PROCEDURE — 2700000000 HC OXYGEN THERAPY PER DAY

## 2022-05-20 PROCEDURE — 85025 COMPLETE CBC W/AUTO DIFF WBC: CPT

## 2022-05-20 PROCEDURE — 2580000003 HC RX 258: Performed by: SURGERY

## 2022-05-20 PROCEDURE — 6360000002 HC RX W HCPCS: Performed by: SURGERY

## 2022-05-20 PROCEDURE — 94761 N-INVAS EAR/PLS OXIMETRY MLT: CPT

## 2022-05-20 PROCEDURE — 80048 BASIC METABOLIC PNL TOTAL CA: CPT

## 2022-05-20 PROCEDURE — 99024 POSTOP FOLLOW-UP VISIT: CPT | Performed by: NURSE PRACTITIONER

## 2022-05-20 PROCEDURE — 97530 THERAPEUTIC ACTIVITIES: CPT

## 2022-05-20 PROCEDURE — 97535 SELF CARE MNGMENT TRAINING: CPT

## 2022-05-20 PROCEDURE — 2500000003 HC RX 250 WO HCPCS: Performed by: SURGERY

## 2022-05-20 PROCEDURE — 6370000000 HC RX 637 (ALT 250 FOR IP): Performed by: NURSE PRACTITIONER

## 2022-05-20 PROCEDURE — 36415 COLL VENOUS BLD VENIPUNCTURE: CPT

## 2022-05-20 RX ORDER — AMOXICILLIN AND CLAVULANATE POTASSIUM 875; 125 MG/1; MG/1
1 TABLET, FILM COATED ORAL 2 TIMES DAILY
Qty: 20 TABLET | Refills: 0 | Status: SHIPPED | OUTPATIENT
Start: 2022-05-20 | End: 2022-05-30

## 2022-05-20 RX ORDER — OXYCODONE HYDROCHLORIDE 5 MG/1
5 TABLET ORAL EVERY 4 HOURS PRN
Qty: 25 TABLET | Refills: 0 | Status: SHIPPED | OUTPATIENT
Start: 2022-05-20 | End: 2022-05-25

## 2022-05-20 RX ADMIN — OXYCODONE 5 MG: 5 TABLET ORAL at 14:15

## 2022-05-20 RX ADMIN — FAMOTIDINE 20 MG: 10 INJECTION INTRAVENOUS at 08:43

## 2022-05-20 RX ADMIN — ENOXAPARIN SODIUM 30 MG: 100 INJECTION SUBCUTANEOUS at 06:19

## 2022-05-20 RX ADMIN — OXYCODONE 5 MG: 5 TABLET ORAL at 04:44

## 2022-05-20 RX ADMIN — MEROPENEM 1000 MG: 1 INJECTION, POWDER, FOR SOLUTION INTRAVENOUS at 05:35

## 2022-05-20 RX ADMIN — OXYCODONE 5 MG: 5 TABLET ORAL at 09:09

## 2022-05-20 RX ADMIN — OXYCODONE 5 MG: 5 TABLET ORAL at 00:43

## 2022-05-20 ASSESSMENT — PAIN SCALES - GENERAL
PAINLEVEL_OUTOF10: 4
PAINLEVEL_OUTOF10: 6
PAINLEVEL_OUTOF10: 4
PAINLEVEL_OUTOF10: 5

## 2022-05-20 ASSESSMENT — PAIN DESCRIPTION - ORIENTATION
ORIENTATION: LEFT;MID
ORIENTATION: MID
ORIENTATION: MID

## 2022-05-20 ASSESSMENT — PAIN DESCRIPTION - PAIN TYPE
TYPE: ACUTE PAIN
TYPE: ACUTE PAIN

## 2022-05-20 ASSESSMENT — PAIN DESCRIPTION - DESCRIPTORS
DESCRIPTORS: ACHING
DESCRIPTORS: PRESSURE;DISCOMFORT
DESCRIPTORS: ACHING

## 2022-05-20 ASSESSMENT — PAIN DESCRIPTION - LOCATION
LOCATION: ABDOMEN
LOCATION: ABDOMEN

## 2022-05-20 NOTE — FLOWSHEET NOTE
05/20/22 0735   Vital Signs   Temp 97.4 °F (36.3 °C)   Temp Source Oral   Pulse 95   Heart Rate Source Monitor   Resp 18   BP (!) 97/56   BP Location Left upper arm   MAP (Calculated) 69.67   Level of Consciousness Alert (0)   MEWS Score 2   Oxygen Therapy   SpO2 97 %   O2 Device Nasal cannula   O2 Flow Rate (L/min) 1 L/min   Shift assessment complete - See flow sheet. Feed tube rate up to the goal rate of 40. Medications given - See [facility]   Patient with no complaints of pain at this time. Patient denies any further needs. Bed alarm is Deferred for low/med risk, A/O with steady gait   Call light in reach  Bedside Mobility Assessment Tool (BMAT):     Assessment Level 1- Sit and Shake    1. From a semi-reclined position, ask patient to sit up and rotate to a seated position at the side of the bed. Can use the bedrail. 2. Ask patient to reach out and grab your hand and shake making sure patient reaches across his/her midline. Pass- Patient is able to come to a seated position, maintain core strength. Maintains seated balance while reaching across midline. Move on to Assessment Level 2. Assessment Level 2- Stretch and Point   1. With patient in seated position at the side of the bed, have patient place both feet on the floor (or stool) with knees no higher than hips. 2. Ask patient to stretch one leg and straighten the knee, then bend the ankle/flex and point the toes. If appropriate, repeat with the other leg. Pass- Patient is able to demonstrate appropriate quad strength on intended weight bearing limb(s). Move onto Assessment Level 3. Assessment Level 3- Stand   1. Ask patient to elevate off the bed or chair (seated to standing) using an assistive device (cane, bedrail). 2. Patient should be able to raise buttocks off be and hold for a count of five. May repeat once. Pass- Patient maintains standing stability for at least 5 seconds, proceed to assessment level 4.     Assessment Level 4- Walk   1. Ask patient to march in place at bedside. 2. Then ask patient to advance step and return each foot. Some medical conditions may render a patient from stepping backwards, use your best clinical judgement. Pass- Patient demonstrates balance while shifting weight and ability to step, takes independent steps, does not use assistive device patient is MOBILITY LEVEL 4.       Mobility Level- 4

## 2022-05-20 NOTE — PROGRESS NOTES
Occupational Therapy Daily Treatment Note    Unit: 2 [place]  Date:  5/20/2022  Patient Name:    Gregory Brewster  Admitting diagnosis:  Colon cancer [facility]) [C18.9]  Admit Date:  5/10/2022  Precautions/Restrictions:  IV and abdominal incision, drain,  PEG        Discharge Recommendations: Home with 24/7 assist and home therapy  DME needs for discharge: Needs Met       Therapy recommendations for staff:   Stand By Assist with use of No AD for all transfers and ambulation within room  within halls    AM-PAC Score: AM-PAC Inpatient Daily Activity Raw Score: 21  Home Health S4 Level: Level 1- Standard       Treatment Time:  0902-8532  Treatment number:  3   Total Treatment Time:  23  minutes    History of Present Illness: Pt admitted for colon surgery for cancer. Subjective:  Pt agreeable to therapy     Pain   Yes  Rating: mild  Location:abdomen   Pain Medicine Status: No request made      Bed Mobility:   Supine to Sit:  Independent with HOB raised  Sit to Supine:  Independent  Rolling:           Independent  Scooting:        Independent    Transfer Training:   Sit to stand:   Independent  Stand to sit: Independent  Bed to Chair:  NT  Bed to [facility]:   Independent  Standard toilet:   NT  Functional mobility:     Independent with pt holding onto IV/PEG tube pole, with pt ambulating to/from bathroom    Activity Tolerance   Pt completed therapy session with Pain noted with activity    Balance  Sitting Balance :     IND static  Standing Balance   Independent    ADL Training: declined-already washed up today  Upper body dressing:  N/A  Upper body bathing:  N/A  Lower body dressing:  N/A  Lower body bathing:  N/A  Toileting:   Independent  Grooming/Hygiene:  Independent  Feeding :   N/A    Therapeutic Exercise:   N/A    Patient Education:   Role of OT  Energy conservation techniques    Positioning Needs: In bed, call light and needs in reach. Family Present:  Yes- friend present for beginning of session.     Assessment: Pt tolerated treatment session well. Pt reports plan is to go home today. Discussed at length her home setup and recommended AE. Pt reports plans on getting a shower chair. GOALS  1). Bed to toilet/BSC: Supervision, goal met 5/20     To be met in 5 Visits:  1). Supine to/from Sit:             Independent  2). Upper Body Bathing:         Independent  3). Lower Body Bathing:         Supervision  4). Upper Body Dressing:       Independent  5). Lower Body Dressing:       Independent  6).  Pt to demonstrate UE exs x 15 reps with minimal cues      Plan: cont with POC    Hope Steel, OTR/L 1264      If patient discharges from this facility prior to next visit, this note will serve as the Discharge Summary

## 2022-05-20 NOTE — CARE COORDINATION
Spoke with Chaitanya BILL in follow up. Possible discharge over weekend or Monday. Request from CM to update [facility] and [facility]. Telephone call to Kyung Nam with [facility]. Informed of dc plan over weekend or Monday. CM will need to call [facility] to inform of dc and [facility] will need orders. Kyung Cyril [phone]. [facility] 24 hour line [phone]. Telephone call to [facility]. Spoke with Deepika Miller. [facility] does not have referral. Telephone call to [facility]. Spoke with Adriana. [facility] does not have referral.    Spoke with Chaitanya. Request for referral to [facility]. Telephone call to [facility]. Spoke with Deepika Miller. [facility] is able to service pt for SN for new TF. Aware of possible dc over weekend or Monday. Informed Vianey BILL of above.     Faxed facesheet, gen surgery progress note and H&P to [facility] [phone]    Electronically signed by Santhosh Moody RN on 5/20/2022 at 11:11 AM

## 2022-05-20 NOTE — DISCHARGE INSTR - COC
Continuity of Care Form    Patient Name: Dannie Speaker   :  1960  MRN:  [id number]    Admit date:  5/10/2022  Discharge date:  ***    Code Status Order: Full Code   Advance Directives:   Advance Care Flowsheet Documentation       Date/Time Healthcare Directive Type of Healthcare Directive Copy in [address] Agent's Name Healthcare Agent's Phone Number    05/10/22 2377 Yes, patient has an advance directive for healthcare treatment -- No, copy requested from family -- -- --            Admitting Physician:  Margaret Gonzalez MD  PCP: Manuel Madden MD    Discharging Nurse: [facility] Unit/Room#: 0203/0203-01  Discharging Unit Phone Number: ***    Emergency Contact:   Extended Emergency Contact Information  Primary Emergency Contact: Nito Stockton  Mobile Phone: [phone]  Relation: Other  Secondary Emergency Contact: Oralia Stoll  Mobile Phone: [phone]  Relation: Other    Past Surgical History:  Past Surgical History:   Procedure Laterality Date    HEMICOLECTOMY Right 5/10/2022    RIGHT COLECTOMY, PARTIAL DUODENECTOMY, SMALL BOWEL RESECTION, CHOLECYSTECTOMY, INSERTION OF FEEDING JEJUNOSTOMY TUBE performed by Margaret Gonzalez MD at [place] [address]         Immunization History: There is no immunization history on file for this patient. Active Problems:  Patient Active Problem List   Diagnosis Code    Colon cancer ([facility] [address] C18.9    Severe protein-calorie malnutrition ([facility] [address] E43       Isolation/Infection:   Isolation            No Isolation          Patient Infection Status       Infection Onset Added Last Indicated Last Indicated By Review Planned Expiration Resolved Resolved By    None active    Resolved    C-diff Rule Out 05/10/22 05/10/22 05/10/22 Clostridium difficile toxin/antigen (Ordered)   22 Coty Hilario RN    CDIFF ORDER .             Nurse Assessment:  Last Vital Signs: BP (!) 109/58   Pulse 107   Temp 98.1 °F (36.7 °C) (Oral)   Resp 16   Ht 5' 3\" (1.6 m)   Wt 103 lb (46.7 kg)   SpO2 95%   BMI 18.25 kg/m²     Last documented pain score (0-10 scale): Pain Level: 5  Last Weight:   Wt Readings from Last 1 Encounters:   05/20/22 103 lb (46.7 kg)     Mental Status:  {IP PT MENTAL STATUS:20030}    IV Access:  { SANDIE IV ACCESS:286714305}    Nursing Mobility/ADLs:  Walking   {P DME KHVC:285767948}  Transfer  {P DME PELX:595196735}  Bathing  {CHP DME RQWZ:782156289}  Dressing  {CHP DME XVUD:980390314}  Toileting  {CHP DME ZSRE:823043048}  Feeding  {P DME GEKB:574934314}  Med Admin  {[facility] DME TBPD:215024900}  Med Delivery   { SANDIE MED Delivery:934846394}    Wound Care Documentation and Therapy:  Incision 05/10/22 Abdomen Medial (Active)   Dressing Status Clean;Dry; Intact 05/20/22 0845   Incision Cleansed Cleansed with saline 05/10/22 1031   Dressing/Treatment Open to air 05/20/22 0845   Closure Staples 05/20/22 0845   Margins Approximated 05/20/22 0845   Incision Assessment Dry 05/20/22 0845   Drainage Amount None 05/20/22 0845   Odor None 05/12/22 2005   Karon-incision Assessment Warm; Intact 05/20/22 0845   Number of days: 10        Elimination:  Continence: Bowel: {YES / VC:15752}  Bladder: {YES / SO:45409}  Urinary Catheter: {Urinary Catheter:337090844}   Colostomy/Ileostomy/Ileal Conduit: {YES / NC:15845}       Date of Last BM: ***    Intake/Output Summary (Last 24 hours) at 5/20/2022 1343  Last data filed at 5/19/2022 2115  Gross per 24 hour   Intake 180 ml   Output --   Net 180 ml     I/O last 3 completed shifts:   In: 0 [P.O.:170; NG/GT:60]  Out: -     Safety Concerns:     [address] SANDIE Safety Concerns:433362929}    Impairments/Disabilities:      [address] HO[address] Impairments/Disabilities:138743214}    Nutrition Therapy:  Current Nutrition Therapy:   [address] SANDIE Diet List:478614789}    Routes of Feeding: {CHP DME Other Feedings:752738647}  Liquids: {Slp liquid thickness:45846}  Daily Fluid Restriction: {CHP DME Yes amt example:274222218}  Last Modified Barium Swallow with Video (Video Swallowing Test): {Done Not Done VNBK:399750076}    Treatments at the Time of Hospital Discharge:   Respiratory Treatments: ***  Oxygen Therapy:  {Therapy; copd oxygen:01803}  Ventilator:    {MH CC Vent GIEX:218807862}    Rehab Therapies: {THERAPEUTIC INTERVENTION:8061908545}  Weight Bearing Status/Restrictions: [facility] CC Weight Bearin}  Other Medical Equipment (for information only, NOT a DME order):  {EQUIPMENT:880698973}  Other Treatments: ***    Patient's personal belongings (please select all that are sent with patient):  {CHP DME Belongings:384187875}    RN SIGNATURE:  {Esignature:012628324}    CASE MANAGEMENT/SOCIAL WORK SECTION    Inpatient Status Date: 5/10/22    Readmission Risk Assessment Score:  Readmission Risk              Risk of Unplanned Readmission:  13           Discharging to Facility/ Agency   [place]  Phone: [phone]  Fax: [phone]    [facility] for tube feeding  Ricardo Ross [phone]  Fax [phone]      / signature: Electronically signed by Jene Cooks, RN on 22 at 2:15 PM EDT    PHYSICIAN SECTION    Prognosis: Good    Condition at Discharge: Stable    Rehab Potential (if transferring to Rehab): Good    Recommended Labs or Other Treatments After Discharge: Jevity 1.5 @ 60 ml/hr x 12 hrs with 100 ml water flush q 6 hr (this will meet only ~ 75% of her estimated needs for calories and Protein patient must take the rest by mouth from her PO diet and supplements) Rate increase  above 60 is not recommended for J TUBE  Suggest drinking a High protein Supplement in addition to meals TID Monitor GI status/function and plan of care. Jejunostomy feeding tube care. Midline incision: wet-to-dry at inferior edge of wound. Please instruct pt on how to perform dressing and J-tube care. See d/c instructions.      Physician Certification: I certify the above information and transfer of Rachel Wall  is necessary for the continuing treatment of the diagnosis listed and that she requires [address] for less 30 days.      Update Admission H&P: No change in H&P    PHYSICIAN SIGNATURE:  Electronically signed by GUY Russell CNP on 5/20/22 at 1:55 PM EDT

## 2022-05-20 NOTE — PLAN OF CARE
Problem: Discharge Planning  Goal: Discharge to home or other facility with appropriate resources  Outcome: Progressing     Problem: Pain  Goal: Verbalizes/displays adequate comfort level or baseline comfort level  Outcome: Progressing     Problem: Safety - Adult  Goal: Free from fall injury  Outcome: Progressing     Problem: ABCDS Injury Assessment  Goal: Absence of physical injury  Outcome: Progressing     Problem: Skin/Tissue Integrity  Goal: Absence of new skin breakdown  Description: 1. Monitor for areas of redness and/or skin breakdown  2. Assess vascular access sites hourly  3. Every 4-6 hours minimum:  Change oxygen saturation probe site  4. Every 4-6 hours:  If on nasal continuous positive airway pressure, respiratory therapy assess nares and determine need for appliance change or resting period.   Outcome: Progressing     Problem: Nutrition Deficit:  Goal: Optimize nutritional status  Outcome: Progressing

## 2022-05-20 NOTE — PROGRESS NOTES
Inpatient Physical Therapy Daily Treatment Note    Unit: [address]  Date:  5/20/2022  Patient Name:    Abeba Doyle  Admitting diagnosis:  Colon cancer [facility]) [C18.9]  Admit Date:  5/10/2022  Precautions/Restrictions:  Fall risk and Lines -IV, Supplemental O2 (1L upon arrival, weaned to RA during session), Drains (GLENN) and J tube, abdominal incision      Discharge Recommendations: Home PRN assist   DME needs for discharge: Needs Met       Therapy recommendation for EMS Transport: can transport by wheelchair    Therapy recommendations for staff:   Supervision with use of No AD for all transfers and ambulation to/from [facility]  to/from chair  to/from bathroom  within room  within halls    History of Present Illness: Per Dr Sharyn Villagomez:  S/p RIGHT COLECTOMY, PARTIAL DUODENECTOMY, SMALL BOWEL RESECTION, CHOLECYSTECTOMY, INSERTION OF FEEDING JEJUNOSTOMY TUBE on 5/10/22  CT abd and pelvis 5/18: multiple abscesses in the right abd and pelvis: this was reviewed with Dr. Lilian Major: collections are not big enough for a drain at this time. Home Health S4 Level Recommendation: NA  AM-PAC Mobility Score   AM-PAC Inpatient Mobility Raw Score : 23  AM-PAC Inpatient Mobility without Stair Climbing Raw Score : 18    Treatment Time:  10:10-10:25  Treatment number: 3  Timed Code Treatment Minutes: 15 minutes  Total Treatment Minutes:  15  minutes    Cognition    A&O x4   Able to follow 2 step commands    Subjective  Patient lying supine in bed with no family present   Pt agreeable to this PT tx. Pain   Yes  Location: Abdomen  Rating:    mild/10  Pain Medicine Status: No request made     Bed Mobility   Supine to Sit:    Independent  Sit to Supine:   Independent  Rolling:   Not Tested  Scooting:   Independent    Transfer Training     Sit to stand:   Independent  Stand to sit:    Independent  Bed to Chair:   Not Tested with use of N/A    Gait Training gait completed as indicated below  Distance:      600 ft (full loop on 2W exercises 3 sets of 10-15 reps  6). Ascend/descend 2 steps /s handrail /c CGA     Plan   Continue with plan of care. Try steps next visit. Signature: Alayna Vasquez, PT, DPT    If patient discharges from this facility prior to next visit, this note will serve as the Discharge Summary.

## 2022-05-20 NOTE — PROGRESS NOTES
Shift assessment complete. See doc flow. Nightly medications given see MAR. Patient on Jevity 1.5 continuous to J-tube. Patient is on a clear liquid diet. Patient with no complaints at this time. Patient has incision to midline w/staples MATIAS. Bed in lowest position and locked. Call light and bedside table within easy reach. 6

## 2022-05-20 NOTE — PROGRESS NOTES
Prescriptions and discharge instructions given. Pt verbalized understanding denies any questions/ needs at this time.  Transport called to transport pt to vehicle for discharge home

## 2022-05-20 NOTE — PROGRESS NOTES
Nutrition Note      Nutrition Recommendations for Nocturnal Feding r/t  Discharging Home Today   1. Jevity 1.5 @ 60 ml/hr x 12 hrs with 100 ml water flush q 6 hr (this will meet only ~ 75% of her estimated needs for calories and Protein patient must take the rest by mouth from her PO diet and supplements) Rate increase  above 60 is not recommended for J TUBE  2. Suggest drinking a High protein Supplement in addition to meals TID Monitor GI status/function and plan of care.    3. Weight today per standing scale was 103#.       Malnutrition Assessment:      Electronically signed by Taylor Jeong RD, LD on 5/20/22 at 1:13 PM EDT    Contact: [phone]

## 2022-05-23 ENCOUNTER — TELEPHONE (OUTPATIENT)
Dept: SURGERY | Age: 62
End: 2022-05-23

## 2022-05-23 NOTE — TELEPHONE ENCOUNTER
Jaylene Elizondo from [address] [facility] called. She saw patient today. Caregiver told her that her BP has been 90/50's and 90/60's over the weekend. This afternoon it was 120/60 per Mayra Olivas RN. Caregiver is asking if she should still take Cardizem due to low BP? And the caregiver has been packing the open incision area with packing soaked with saline daily. Mayra Olivas is asking if they can have an order for this to be done daily, or if you would want to order something else for wound changes, and how often would you want them to change the dressing?

## 2022-05-23 NOTE — TELEPHONE ENCOUNTER
Rigoberto Jerez from [address] [facility] called. They are going to see her today for Home Care evaluation. Her PCP, Dr. Mary Recio will not sign her home health orders until she follow up with him. Patient does not want to see him any longer for care, and she is looking for a new PCP.  [address] [facility] will fax her orders here for signature for JTube feedings and PT and OT eval.

## 2022-05-24 ENCOUNTER — TELEPHONE (OUTPATIENT)
Dept: SURGERY | Age: 62
End: 2022-05-24

## 2022-05-24 NOTE — TELEPHONE ENCOUNTER
I called and informed [facility] at [facility]. She states she will let patient know.

## 2022-05-24 NOTE — TELEPHONE ENCOUNTER
Britton Rush RN from [facility] called saying patient had called her and was concerned she has thrush. She states pt friend is a nurse and says it appears to be thrush. .. Britton Rush wants to know If Dr. Franks will send an RX for this.     [address] [facility]- [phone]

## 2022-05-25 ENCOUNTER — TELEPHONE (OUTPATIENT)
Dept: SURGERY | Age: 62
End: 2022-05-25

## 2022-05-25 NOTE — TELEPHONE ENCOUNTER
ADRIA PHONE CALL: Dariana  from [facility] health called wanting to let us know that Patrick Carroll has 4 pound since she has been home but she is being seen at [facility] tomorrow with Dr. Akosua Rodriguez. Still doing tube feeding and supplementing with ensure.     If you need to reach louie [phone]

## 2022-06-03 ENCOUNTER — TELEPHONE (OUTPATIENT)
Dept: SURGERY | Age: 62
End: 2022-06-03

## 2022-06-03 NOTE — TELEPHONE ENCOUNTER
I called and spoke to Richy, RN from [facility] [place], [facility].. He states the previous dressing was in place around her feeding tube since day of surgery 5/10/22, and drainage has been scant. When he removed it today, it was a slightly purulent. He states the skin looks good. They had to remove the fastening strap, and he has cleaned the area and changed dressing. He states she does not have any fever and it does not show any signs of infection. He just wanted to make you aware, and she has appt to f/up on Thursday 6/9/22.

## 2022-06-15 ENCOUNTER — TELEPHONE (OUTPATIENT)
Dept: SURGERY | Age: 62
End: 2022-06-15

## 2022-06-15 NOTE — TELEPHONE ENCOUNTER
Marco Antonio from [address] called to state patient's J-tube suture has come loose. He states it is still in place and working well, and he has taped it in place. Per Antione POWELL, as long as it is working and still in place, then OK to tape. If she has any problems or it comes out or does not work, she will need to go to ER. I informed [facility] and also offered appt here at the office if she would like. He will let patient and family know and call us if they want appt.

## 2022-06-20 NOTE — DISCHARGE SUMMARY
GENERAL SURGERY  Discharge Summary      Pt Oneida Galindo  MRN: [id number]  YOB: 1960  Primary Care Physician: Meir Hodge MD  Admit date:  5/10/2022  6:10 AM  Discharge date:  5/20/2022  4:15 PM  Disposition: Home with home care  Admitting Diagnosis:   1. Malignant neoplasm of ascending colon [facility])      Discharge Diagnosis:   Patient Active Problem List   Diagnosis Code    Colon cancer ([facility] [address] C18.9    Severe protein-calorie malnutrition ([facility] [address] E43     Consultants:  none  Procedures/Diagnostic Test:  Right colectomy, SBR, partial duodenectomy, cholecystectomy and j-tube insertion. Hospital Course: Cristina Plummer originally presented to the hospital on 5/10/2022  6:10 AM for a right colectomy, SBR, partial duodenectomy, cholecystectomy and a jejunostomy feeding tube placement after a colonoscopy revealed a large tumor in the hepatic flexures. Prior to the procedure, all the risks, benefits and alternatives were discussed with the patient and she agreed to proceed with the procedures. At this point, the patient was taken to the operating room where the aforementioned procedures were carried out. The patient tolerated the procedures well and she was transferred to recovery in stable condition. Post-operatively, the patient did well. She was tolerating tube feeds and a full liquid diet. She was participating in PT/OT. She was having bowel movements and her abdominal pain was controlled with PO pain medications. As a result, she was cleared for discharge to home in stable condition with home care in place. At time of discharge, Cristina Plummer was tolerating a full liquids and tube feeds, having bowel movements,ambulating on her own accord and had adequate analgesia on oral pain medications, and had no signs of symptoms of complications. PHYSICAL EXAMINATION   Discharge Vitals:  height is 5' 3\" (1.6 m) and weight is 103 lb (46.7 kg). Her oral temperature is 98.1 °F (36.7 °C).  Her blood pressure is 109/58 (abnormal) and her pulse is 107. Her respiration is 16 and oxygen saturation is 95%. General appearance - alert, well appearing, and in no distress  Abdomen - midline dressing changed and looked OK  soft, incisional tenderness only, bowel sounds present  Neurological - motor and sensory grossly normal bilaterally  Musculoskeletal - full range of motion without pain  Extremities - peripheral pulses normal, no pedal edema, no clubbing or cyanosis  Incision: healing well    LABS   No results for input(s): WBC, HGB, HCT, PLT, NA, K, CL, CO2, BUN, CREATININE in the last 72 hours. DISCHARGE INSTRUCTIONS   Discharge Medications:      Medication List      CONTINUE taking these medications    dilTIAZem 180 MG extended release capsule  Commonly known as: CARDIZEM CD     ESTRADIOL ACETATE PO        ASK your doctor about these medications    amoxicillin-clavulanate 875-125 MG per tablet  Commonly known as: AUGMENTIN  Take 1 tablet by mouth 2 times daily for 10 days  Ask about: Should I take this medication?     oxyCODONE 5 MG immediate release tablet  Commonly known as: ROXICODONE  Take 1 tablet by mouth every 4 hours as needed for Pain for up to 5 days. Ask about: Should I take this medication?            Where to Get Your Medications      These medications were sent to [id number] [place] [address], [address] [place] [id number]    Phone: [phone]   · amoxicillin-clavulanate 875-125 MG per tablet  · oxyCODONE 5 MG immediate release tablet       Diet: full liquids  Activity: no lifting more than 10-20 lbs for next two weeks  Wound Care: J-tube care; change midline dressing daily and PRN  Follow-up:  On 5/26 with Dr. Emily Beltran at [facility]   Time Spent for discharge: 20 minutes      Electronically signed by GUY Judd CNP on 6/20/2022 at 9:06 AM      This patient is being prescribed a dose of opioid pain medication greater than 30 MED due to excessive pain from recent surgery or an acute inflammatory process.

## 2022-06-30 LAB — REFERENCE LAB: NORMAL

## 2022-07-02 LAB
A/G RATIO: 0.9 (ref 0.7–1.7)
A/G RATIO: 1.1 (ref 1.2–2.2)
ABNORMAL PROTEIN BAND 1: ABNORMAL G/DL
ALBUMIN ELP: 3.6 G/DL (ref 2.9–4.4)
ALBUMIN SERPL-MCNC: 4.3 G/DL (ref 3.8–4.8)
ALP BLD-CCNC: 100 IU/L (ref 44–121)
ALPHA 2: 0.9 G/DL (ref 0.4–1)
ALPHA-1-GLOBULIN: 0.3 G/DL (ref 0–0.4)
ALT SERPL-CCNC: 16 IU/L (ref 0–32)
AST SERPL-CCNC: 23 IU/L (ref 0–40)
BASOPHILS ABSOLUTE: 0 X10E3/UL (ref 0–0.2)
BASOPHILS RELATIVE PERCENT: 1 %
BETA GLOBULIN: 1.3 G/DL (ref 0.7–1.3)
BILIRUB SERPL-MCNC: 0.4 MG/DL (ref 0–1.2)
BILIRUBIN URINE: NEGATIVE
BLOOD, URINE: NEGATIVE
BUN / CREAT RATIO: 25 (ref 12–28)
BUN BLDV-MCNC: 19 MG/DL (ref 8–27)
CA 19-9: 24 U/ML (ref 0–35)
CALCIUM SERPL-MCNC: 9.6 MG/DL (ref 8.7–10.3)
CARCINOEMBRYONIC ANTIGEN: 2.4 NG/ML (ref 0–4.7)
CHLORIDE BLD-SCNC: 100 MMOL/L (ref 96–106)
CLARITY: CLEAR
CO2: 22 MMOL/L (ref 20–29)
COLOR: YELLOW
COMMENT: ABNORMAL
CREAT SERPL-MCNC: 0.75 MG/DL (ref 0.57–1)
CULTURE: NORMAL
CULTURE: NORMAL
D DIMER: 0.41 MG/L FEU (ref 0–0.49)
DAT POLYSPECIFIC: NEGATIVE
EOSINOPHILS ABSOLUTE: 0.1 X10E3/UL (ref 0–0.4)
EOSINOPHILS RELATIVE PERCENT: 1 %
ERYTHROCYTES, NUCLEATED/100 LEU: ABNORMAL
ESTIMATED GLOMERULAR FILTRATION RATE CREATININE EQUATION: 90 ML/MIN/1.73
FERRITIN: 14 NG/ML (ref 15–150)
FOLATE: >20 NG/ML
FREE KAPPA/LAMBDA RATIO: 1.26 (ref 0.26–1.65)
GAMMA GLOBULIN: 2 G/DL (ref 0.4–1.8)
GLOBULIN: 3.8 G/DL (ref 1.5–4.5)
GLOBULIN: 4.5 G/DL (ref 2.2–3.9)
GLUCOSE BLD-MCNC: 97 MG/DL (ref 65–99)
GLUCOSE URINE: NEGATIVE
HAPTOGLOBIN: 228 MG/DL (ref 37–355)
HCT VFR BLD CALC: 37.1 % (ref 34–46.6)
HEMOGLOBIN: 11.4 G/DL (ref 11.1–15.9)
IGG,SERUM: 1963 MG/DL (ref 586–1602)
IGM,SERUM: 71 MG/DL (ref 26–217)
IMMATURE CELLS ABSOLUTE COUNT: ABNORMAL
IMMATURE GRANS (ABS): 0 X10E3/UL (ref 0–0.1)
IMMATURE GRANULOCYTES: 0 %
IMMUNOGLOBULIN A, SERUM: 438 MG/DL (ref 87–352)
INTERPRETATION: ABNORMAL
IRON SATURATION: 5 % (ref 15–55)
IRON: 21 UG/DL (ref 27–139)
KAPPA FREE LIGHT CHAINS QNT: 51.7 MG/L (ref 3.3–19.4)
KETONES, URINE: NEGATIVE
LABORATORY REPORT: ABNORMAL
LACTATE DEHYDROGENASE: 172 IU/L (ref 119–226)
LAMBDA FREE LIGHT CHAINS QNT: 41 MG/L (ref 5.7–26.3)
LEUKOCYTE ESTERASE, URINE: NEGATIVE
LYMPHOCYTES ABSOLUTE: 1.7 X10E3/UL (ref 0.7–3.1)
LYMPHOCYTES RELATIVE PERCENT: 29 %
MCH RBC QN AUTO: 24.9 PG (ref 26.6–33)
MCHC RBC AUTO-ENTMCNC: 30.7 G/DL (ref 31.5–35.7)
MCV RBC AUTO: 81 FL (ref 79–97)
MICROSCOPIC EXAMINATION: NORMAL
MONOCYTES ABSOLUTE: 0.6 X10E3/UL (ref 0.1–0.9)
MONOCYTES RELATIVE PERCENT: 10 %
MORPHOLOGY: ABNORMAL
NEUTROPHILS ABSOLUTE: 3.4 X10E3/UL (ref 1.4–7)
NITRITE, URINE: NEGATIVE
PDW BLD-RTO: 15.1 % (ref 11.7–15.4)
PH UA: 6.5 (ref 5–7.5)
PLATELET # BLD: 270 X10E3/UL (ref 150–450)
POTASSIUM SERPL-SCNC: 4.1 MMOL/L (ref 3.5–5.2)
PROTEIN UA: NEGATIVE
RBC # BLD: 4.58 X10E6/UL (ref 3.77–5.28)
SEDIMENTATION RATE, ERYTHROCYTE: 98 MM/HR (ref 0–40)
SEGMENTED NEUTROPHILS RELATIVE PERCENT: 59 %
SODIUM BLD-SCNC: 137 MMOL/L (ref 134–144)
SPECIFIC GRAVITY UA: 1.01 (ref 1–1.03)
T4 TOTAL: 8.4 UG/DL (ref 4.5–12)
TOTAL IRON BINDING CAPACITY: 423 UG/DL (ref 250–450)
TOTAL PROTEIN: 8.1 G/DL (ref 6–8.5)
TSH SERPL DL<=0.05 MIU/L-ACNC: 3.36 UIU/ML (ref 0.45–4.5)
UNSATURATED IRON BINDING CAPACITY: 402 UG/DL (ref 118–369)
URIC ACID, SERUM: 4.3 MG/DL (ref 3–7.2)
UROBILINOGEN, URINE: 0.2 MG/DL (ref 0.2–1)
VITAMIN B-12: 430 PG/ML (ref 232–1245)
VITAMIN D 25-HYDROXY: 32.6 NG/ML (ref 30–100)
WBC # BLD: 5.8 X10E3/UL (ref 3.4–10.8)

## 2022-07-19 LAB
A/G RATIO: 1.1 G/DL (ref 1–2.5)
ALBUMIN SERPL-MCNC: 3.4 G/DL (ref 3.5–5)
ALP BLD-CCNC: 85 U/L (ref 38–126)
ALT SERPL-CCNC: 22 U/L (ref 0–34)
ANION GAP SERPL CALCULATED.3IONS-SCNC: 14.8 MMOL/L (ref 8–16)
ANISOCYTOSIS: ABNORMAL
AST SERPL-CCNC: 48 U/L (ref 14–36)
BASOPHILS RELATIVE PERCENT: 0.4 % (ref 0–1)
BILIRUB SERPL-MCNC: 0.5 MG/DL (ref 0.2–1.3)
BUN BLDV-MCNC: 24 MG/DL (ref 7–17)
CALCIUM SERPL-MCNC: 8.1 MG/DL (ref 8.6–10.3)
CHLORIDE BLD-SCNC: 109 MMOL/L (ref 98–107)
CO2: 21 MMOL/L (ref 22–30)
CREAT SERPL-MCNC: 0.5 MG/DL (ref 0.52–1.04)
EOSINOPHILS RELATIVE PERCENT: 1.3 % (ref 0–5)
ERYTHROCYTE DISTRIBUTION WIDTH RBC RATIO: 25.1 % (ref 11.6–14.8)
GFR CALCULATED: 125
GLOBULIN: 3.1 G/DL (ref 2–3.5)
GLUCOSE BLD-MCNC: 80 MG/DL (ref 74–100)
GRANULOCYTE ABSOLUTE COUNT: 3 X(10)3/UL (ref 1.8–7.2)
HCT VFR BLD CALC: 34.3 % (ref 35.7–46.7)
HEMOGLOBIN: 10.6 G/DL (ref 11.9–15.9)
IMMATURE GRANULOCYTES %: 0.4 % (ref 0–0.5)
LACTATE DEHYDROGENASE: 171 U/L (ref 120–246)
LYMPHOCYTES ABSOLUTE: 1.6 X(10)3/UL (ref 1.1–2.7)
LYMPHOCYTES RELATIVE PERCENT: 31.1 % (ref 15–45)
MCH RBC QN AUTO: 27.4 PG (ref 28.1–33.6)
MCHC RBC AUTO-ENTMCNC: 30.9 G/DL (ref 32.8–34.7)
MCV RBC AUTO: 88.6 FL (ref 82.9–99.9)
MONOCYTES RELATIVE PERCENT: 9.7 % (ref 0–12)
NEUTROPHILS/100 LEUKOCYTES: 57.1 % (ref 40–70)
PLATELETS: 150 X1000 (ref 175–420)
PLATELETS: ABNORMAL
POTASSIUM SERPL-SCNC: 3.8 MMOL/L (ref 3.4–5.1)
RBC # BLD: 3.87 X1000000 (ref 3.72–5.31)
RBC # BLD: ABNORMAL 10*6/UL
SODIUM BLD-SCNC: 141 MMOL/L (ref 137–145)
TOTAL PROTEIN: 6.5 G/DL (ref 6.3–8.2)
WBC # BLD: 5.3 X1000 (ref 4.5–10.3)

## 2022-09-01 ENCOUNTER — OFFICE VISIT (OUTPATIENT)
Dept: FAMILY MEDICINE CLINIC | Age: 62
End: 2022-09-01
Payer: COMMERCIAL

## 2022-09-01 VITALS
BODY MASS INDEX: 17.89 KG/M2 | OXYGEN SATURATION: 96 % | DIASTOLIC BLOOD PRESSURE: 72 MMHG | SYSTOLIC BLOOD PRESSURE: 130 MMHG | WEIGHT: 101 LBS | HEART RATE: 94 BPM

## 2022-09-01 DIAGNOSIS — Z76.89 ENCOUNTER TO ESTABLISH CARE: Primary | ICD-10-CM

## 2022-09-01 DIAGNOSIS — C18.2 MALIGNANT NEOPLASM OF ASCENDING COLON (HCC): ICD-10-CM

## 2022-09-01 DIAGNOSIS — I10 PRIMARY HYPERTENSION: ICD-10-CM

## 2022-09-01 PROCEDURE — 99204 OFFICE O/P NEW MOD 45 MIN: CPT

## 2022-09-01 RX ORDER — VITAMIN B COMPLEX
1 CAPSULE ORAL DAILY
COMMUNITY

## 2022-09-01 RX ORDER — FERROUS SULFATE 325(65) MG
325 TABLET ORAL
COMMUNITY

## 2022-09-01 RX ORDER — PANTOPRAZOLE SODIUM 40 MG/1
40 TABLET, DELAYED RELEASE ORAL DAILY
COMMUNITY
Start: 2022-08-08

## 2022-09-01 RX ORDER — ERGOCALCIFEROL 1.25 MG/1
CAPSULE ORAL WEEKLY
COMMUNITY
Start: 2022-08-04

## 2022-09-01 RX ORDER — ONDANSETRON 8 MG/1
8 TABLET, ORALLY DISINTEGRATING ORAL EVERY 8 HOURS PRN
COMMUNITY
Start: 2022-07-25

## 2022-09-01 RX ORDER — M-VIT,TX,IRON,MINS/CALC/FOLIC 27MG-0.4MG
1 TABLET ORAL DAILY
COMMUNITY

## 2022-09-01 ASSESSMENT — PATIENT HEALTH QUESTIONNAIRE - PHQ9
SUM OF ALL RESPONSES TO PHQ QUESTIONS 1-9: 0
SUM OF ALL RESPONSES TO PHQ QUESTIONS 1-9: 0
SUM OF ALL RESPONSES TO PHQ9 QUESTIONS 1 & 2: 0
SUM OF ALL RESPONSES TO PHQ QUESTIONS 1-9: 0
SUM OF ALL RESPONSES TO PHQ QUESTIONS 1-9: 0
2. FEELING DOWN, DEPRESSED OR HOPELESS: 0
1. LITTLE INTEREST OR PLEASURE IN DOING THINGS: 0

## 2022-09-01 ASSESSMENT — ENCOUNTER SYMPTOMS
GASTROINTESTINAL NEGATIVE: 1
RESPIRATORY NEGATIVE: 1

## 2022-09-01 NOTE — PROGRESS NOTES
New Patient      Merlyn Prakash  YOB: 1960    Date of Service:  9/1/2022    Chief Complaint:   Merlyn Prakash is a 59 y.o. female who presents for   Chief Complaint   Patient presents with    New Patient        HPI: pt seen today to establish care. Was seeing Raegan Clive. states is changing because he told her he would not follow post surgery care from colon mass surgery. HTN: today 130/82. Is not on any Bp meds currently since surgery. States was 120lbs in February 2022 and is now 101lb today. Has good appetite per pt. Is drinking daily boost once to twice daily. Pete Roy is the pt oncologist at the cancer center in [facility] who she was following after finding mass on colon during colonoscopy.  did abd sx for right colectomy, small bowel resection, partial duodenectomy, cholecystectomy, and J-tube insertion. Sx was on 5/10/22. Was admitted 10 days. Pt states had one chemo treatment which did not go very well. Pt is awaiting insurance coverage to cover treatment advised by Pete Roy. is getting weekly blood draws. Had labs last Thursday. Will await labs to be faxed for viewing. Had PET scan on 8/22/22 that showed a hypermetabolic uptake within a low density mass arising from or directly adjacent to the inferior margin of the right lobe of the liver. In on iron supplement and vitamin D from Pete Roy.      Advance Directive: N, <no information>    Immunization History   Administered Date(s) Administered    COVID-19, J&J, (age 18y+), IM, 0.5 mL 03/08/2021    COVID-19, MODERNA BLUE border, Primary or Immunocompromised, (age 12y+), IM, 100 mcg/0.5mL 11/01/2021, 06/28/2022    Pneumococcal conjugate PCV20, PF (Prevnar 20) 07/12/2022    Zoster Recombinant (Shingrix) 07/24/2021, 10/08/2021       Allergies   Allergen Reactions    Codeine     Other Diarrhea     5FU chemo drug     Penicillins     Sulfa Antibiotics        Outpatient Medications Marked as Taking for the 9/1/22 encounter (Office Visit) with GUY Mata - CNP   Medication Sig Dispense Refill    vitamin D (ERGOCALCIFEROL) 1.25 MG (44177 UT) CAPS capsule once a week      pantoprazole (PROTONIX) 40 MG tablet Take 40 mg by mouth daily      ondansetron (ZOFRAN-ODT) 8 MG TBDP disintegrating tablet Place 8 mg under the tongue every 8 hours as needed      Multiple Vitamins-Minerals (THERAPEUTIC MULTIVITAMIN-MINERALS) tablet Take 1 tablet by mouth daily      ferrous sulfate (IRON 325) 325 (65 Fe) MG tablet Take 325 mg by mouth daily (with breakfast)      b complex vitamins capsule Take 1 capsule by mouth daily      Calcium-Cholecalciferol-Zinc (VIACTIV CALCIUM IMMUNE) 650-20-5.5 MG-MCG-MG CHEW Take by mouth      Magic Mouthwash (MIRACLE MOUTHWASH) Swish and spit 5 mLs 4 times daily as needed for Irritation 60 mL 1    ESTRADIOL ACETATE PO Take 1 tablet by mouth at bedtime         Past Medical History:   Diagnosis Date    Hypertension     Malignant neoplasm of ascending colon [facility])        Past Surgical History:   Procedure Laterality Date    HEMICOLECTOMY Right 5/10/2022    RIGHT COLECTOMY, PARTIAL DUODENECTOMY, SMALL BOWEL RESECTION, CHOLECYSTECTOMY, INSERTION OF FEEDING JEJUNOSTOMY TUBE performed by Juanjose Chan MD at [place] [address]         Family History   Problem Relation Age of Onset    Cancer Mother     Cancer Father     Cancer Sister        Social History     Socioeconomic History    Marital status:       Spouse name: Not on file    Number of children: Not on file    Years of education: Not on file    Highest education level: Not on file   Occupational History    Not on file   Tobacco Use    Smoking status: Every Day     Packs/day: 1.00     Years: 47.00     Pack years: 47.00     Types: Cigarettes    Smokeless tobacco: Never   Vaping Use    Vaping Use: Never used   Substance and Sexual Activity    Alcohol use: Yes     Comment: seldom    Drug use: Never    Sexual activity: Not on file   Other Topics Concern    Not on file   Social History Narrative    Not on file     Social Determinants of Health     Financial Resource Strain: Not on file   Food Insecurity: Not on file   Transportation Needs: Not on file   Physical Activity: Not on file   Stress: Not on file   Social Connections: Not on file   Intimate Partner Violence: Not on file   Housing Stability: Not on file       Review ofSystems:  Review of Systems   Constitutional: Negative. HENT: Negative. Respiratory: Negative. Cardiovascular: Negative. Gastrointestinal: Negative. Musculoskeletal: Negative. Skin: Negative. Allergic/Immunologic: Positive for immunocompromised state. On chemo     Neurological:  Positive for numbness. Numbness in feet from chemo     Psychiatric/Behavioral: Negative. Physical Exam:   /72   Pulse 94   Wt 101 lb (45.8 kg)   SpO2 96%   BMI 17.89 kg/m²     Physical Exam  Constitutional:       Appearance: Normal appearance. HENT:      Head: Normocephalic and atraumatic. Eyes:      Extraocular Movements: Extraocular movements intact. Pupils: Pupils are equal, round, and reactive to light. Cardiovascular:      Rate and Rhythm: Normal rate and regular rhythm. Pulses: Normal pulses. Heart sounds: Normal heart sounds. No murmur heard. Pulmonary:      Effort: Pulmonary effort is normal.      Breath sounds: Normal breath sounds. No wheezing. Abdominal:      General: Bowel sounds are normal.   Musculoskeletal:         General: Normal range of motion. Cervical back: Normal range of motion and neck supple. Skin:     General: Skin is warm. Capillary Refill: Capillary refill takes less than 2 seconds. Neurological:      General: No focal deficit present. Mental Status: She is alert and oriented to person, place, and time. Psychiatric:         Mood and Affect: Mood normal.         Behavior: Behavior normal.       Assessment/Plan:    1. Encounter to establish care  Patient seen in office today to establish care. Previous PCP Dr. Idania Montana. Patient will sign medical release of information form so we can obtain medical records for viewing. Patient states had recent labs drawn last week. Will not draw labs at this time due to possible duplication which will be not covered under insurance. 2. Malignant neoplasm of ascending colon [facility])  Patient being followed by cancer specialist Perla Smith at [facility]. 3. Primary hypertension  BP stable today at 130/72. Not currently taking blood pressure medication. Patient advised to continue monitoring BP at home on a routine basis and notify the office of increasing BP greater than 140/90. Follow-up in office in 3 months    45 minutes spent on patient care today reviewing notes, labs and discussing plan of care moving forward. This document was prepared by a combination of typing and transcription through a voice recognition software.     Erasto Fagan, GUY - CNP

## 2022-10-18 DIAGNOSIS — Z00.00 WELLNESS EXAMINATION: Primary | ICD-10-CM

## 2022-11-21 ENCOUNTER — COMMUNITY OUTREACH (OUTPATIENT)
Dept: FAMILY MEDICINE CLINIC | Age: 62
End: 2022-11-21

## 2022-11-21 NOTE — PROGRESS NOTES
Patient's HM shows they are overdue for Mammogram and Cervical Cancer Screening. [employer] and  files searched.  updated with 2022 colonoscopy.

## 2023-01-09 ENCOUNTER — TELEMEDICINE (OUTPATIENT)
Dept: FAMILY MEDICINE CLINIC | Age: 63
End: 2023-01-09
Payer: COMMERCIAL

## 2023-01-09 DIAGNOSIS — E04.1 THYROID NODULE: ICD-10-CM

## 2023-01-09 DIAGNOSIS — R09.89 CHEST CONGESTION: ICD-10-CM

## 2023-01-09 DIAGNOSIS — R60.0 BILATERAL LOWER EXTREMITY EDEMA: ICD-10-CM

## 2023-01-09 DIAGNOSIS — J43.9 PULMONARY EMPHYSEMA, UNSPECIFIED EMPHYSEMA TYPE (HCC): ICD-10-CM

## 2023-01-09 DIAGNOSIS — R09.89 PULMONARY VASCULAR CONGESTION: ICD-10-CM

## 2023-01-09 DIAGNOSIS — R06.02 SOB (SHORTNESS OF BREATH): Primary | ICD-10-CM

## 2023-01-09 LAB
A/G RATIO: 0.8 G/DL (ref 1–2.5)
ALBUMIN SERPL-MCNC: 2.6 G/DL (ref 3.5–5)
ALP BLD-CCNC: 102 U/L (ref 38–126)
ALT SERPL-CCNC: 26 U/L (ref 0–34)
ANION GAP SERPL CALCULATED.3IONS-SCNC: 5.2 MMOL/L (ref 8–16)
AST SERPL-CCNC: 74 U/L (ref 14–36)
BILIRUB SERPL-MCNC: 0.2 MG/DL (ref 0.2–1.3)
BUN BLDV-MCNC: 11 MG/DL (ref 7–17)
CALCIUM SERPL-MCNC: 8.4 MG/DL (ref 8.6–10.3)
CHLORIDE BLD-SCNC: 102 MMOL/L (ref 98–107)
CO2: 34 MMOL/L (ref 22–30)
CREAT SERPL-MCNC: 0.5 MG/DL (ref 0.52–1.04)
D-DIMER QUANTITATIVE: 1.75 MG/L FEU (ref 0–0.49)
GFR CALCULATED: 124
GLOBULIN: 3.2 G/DL (ref 2–3.5)
GLUCOSE BLD-MCNC: 95 MG/DL (ref 74–100)
POTASSIUM SERPL-SCNC: 3.2 MMOL/L (ref 3.4–5.1)
PRO-BNP: 2220 PG/ML (ref 0–125)
SODIUM BLD-SCNC: 138 MMOL/L (ref 137–145)
TOTAL PROTEIN: 5.8 G/DL (ref 6.3–8.2)

## 2023-01-09 PROCEDURE — 99214 OFFICE O/P EST MOD 30 MIN: CPT

## 2023-01-09 RX ORDER — MAGNESIUM OXIDE 400 MG/1
400 TABLET ORAL DAILY
COMMUNITY

## 2023-01-09 RX ORDER — GABAPENTIN 300 MG/1
300 CAPSULE ORAL 3 TIMES DAILY
COMMUNITY

## 2023-01-09 RX ORDER — MORPHINE SULFATE 50 MG/1
50 CAPSULE, EXTENDED RELEASE ORAL 2 TIMES DAILY
COMMUNITY

## 2023-01-09 ASSESSMENT — ENCOUNTER SYMPTOMS
GASTROINTESTINAL NEGATIVE: 1
SHORTNESS OF BREATH: 1
COUGH: 1

## 2023-01-09 ASSESSMENT — PATIENT HEALTH QUESTIONNAIRE - PHQ9
SUM OF ALL RESPONSES TO PHQ QUESTIONS 1-9: 0
2. FEELING DOWN, DEPRESSED OR HOPELESS: 0
SUM OF ALL RESPONSES TO PHQ QUESTIONS 1-9: 0
1. LITTLE INTEREST OR PLEASURE IN DOING THINGS: 0
SUM OF ALL RESPONSES TO PHQ QUESTIONS 1-9: 0
SUM OF ALL RESPONSES TO PHQ9 QUESTIONS 1 & 2: 0
SUM OF ALL RESPONSES TO PHQ QUESTIONS 1-9: 0

## 2023-01-09 NOTE — PROGRESS NOTES
2023    TELEHEALTH EVALUATION -- Audio/Visual (During [id number] public health emergency)    HPI:  Patient evaluated today through video visit. Patient was admitted to [facility] on 2022 for shortness of breath and chest congestion. Patient was diagnosed with pneumonia and influenza. Patient was in the hospital for roughly 9 days before being discharged this past Thursday. Patient states since being discharged she has had worsening shortness of breath, chest congestion, increased bilateral lower extremity edema. Patient is very short of breath with exertion and having trouble walking any kind of short distance without becoming winded. Patient has rattling cough and is concerned that her condition is worsening. I reviewed the patient's most recent imaging studies from [facility].  Most recent chest x-ray on 2023 was negative. CTA of the chest showed incidental findings of thyroid nodule. Also showed extensive bilateral emphysema. See other results and CTA impression in epic. On the patient's initial chest x-ray on 2022 There was concern for pulmonary vascular congestion. Patient reports that she never saw a cardiologist or pulmonologist during her stay at [facility].  Patient does not actively follow pulmonologist for her emphysema. Michelle Friend (:  1960) has requested an audio/video evaluation for the following concern(s):    Review of Systems   Constitutional: Negative. HENT:  Positive for congestion. Respiratory:  Positive for cough and shortness of breath. Cardiovascular:  Positive for leg swelling. Negative for chest pain and palpitations. Gastrointestinal: Negative. Neurological: Negative. Hematological: Negative. Prior to Visit Medications    Medication Sig Taking? Authorizing Provider   gabapentin (NEURONTIN) 300 MG capsule Take 300 mg by mouth 3 times daily.  Yes Historical Provider, MD   morphine (KATE) 50 MG extended release capsule Take 50 mg by mouth in the morning and at bedtime.  Yes Historical Provider, MD   magnesium oxide (MAG-OX) 400 MG tablet Take 400 mg by mouth daily Yes Historical Provider, MD   vitamin D (ERGOCALCIFEROL) 1.25 MG (07665 UT) CAPS capsule once a week Yes Historical Provider, MD   pantoprazole (PROTONIX) 40 MG tablet Take 40 mg by mouth daily Yes Historical Provider, MD   ondansetron (ZOFRAN-ODT) 8 MG TBDP disintegrating tablet Place 8 mg under the tongue every 8 hours as needed Yes Historical Provider, MD   ESTRADIOL ACETATE PO Take 1 tablet by mouth at bedtime Yes Historical Provider, MD   Multiple Vitamins-Minerals (THERAPEUTIC MULTIVITAMIN-MINERALS) tablet Take 1 tablet by mouth daily  Historical Provider, MD   ferrous sulfate (IRON 325) 325 (65 Fe) MG tablet Take 325 mg by mouth daily (with breakfast)  Patient not taking: Reported on 1/9/2023  Historical Provider, MD   b complex vitamins capsule Take 1 capsule by mouth daily  Patient not taking: Reported on 1/9/2023  Historical Provider, MD   Calcium-Cholecalciferol-Zinc (VIACTIV CALCIUM IMMUNE) 650-20-5.5 MG-MCG-MG CHEW Take by mouth  Patient not taking: Reported on 1/9/2023  Historical Provider, MD   Magic Mouthwash (MIRACLE MOUTHWASH) Swish and spit 5 mLs 4 times daily as needed for Irritation  Patient not taking: Reported on 1/9/2023  Sharon Heller MD       Social History     Tobacco Use    Smoking status: Every Day     Packs/day: 1.00     Years: 47.00     Pack years: 47.00     Types: Cigarettes    Smokeless tobacco: Never   Vaping Use    Vaping Use: Never used   Substance Use Topics    Alcohol use: Yes     Comment: seldom    Drug use: Never        Allergies   Allergen Reactions    Codeine     Other Diarrhea     5FU chemo drug     Penicillins     Sulfa Antibiotics    ,   Past Medical History:   Diagnosis Date    Hypertension     Malignant neoplasm of ascending colon ([address]        PHYSICAL EXAMINATION:  [ INSTRUCTIONS:  \"[x]\" Indicates a positive item  \"[]\" Indicates a negative item  -- DELETE ALL ITEMS NOT EXAMINED]  Vital Signs: (As obtained by patient/caregiver or practitioner observation)    Blood pressure-N/A heart rate-N/A   respiratory rate-N/A   temperature-N/A pulse oximetry-N/A    Constitutional: [x] Appears well-developed and well-nourished [] No apparent distress      [] Abnormal-   Mental status  [x] Alert and awake  [x] Oriented to person/place/time [x]Able to follow commands      Eyes:  EOM      Normal  [] Abnormal-  Sclera  [x][]  Normal  [] Abnormal -         Discharge [x]  None visible  [] Abnormal -    HENT:   [x] Normocephalic, atraumatic. [] Abnormal   [] Mouth/Throat: Mucous membranes are moist.     External Ears [x] Normal  [] Abnormal-     Neck: [x] No visualized mass     Pulmonary/Chest: [] Respiratory effort normal.  [] No visualized signs of difficulty breathing or respiratory distress        [x] Abnormal-appears some short of breath with discussion. Musculoskeletal:   [] Normal gait with no signs of ataxia         [x] Normal range of motion of neck        [] Abnormal-       Neurological:        [x] No Facial Asymmetry (Cranial nerve 7 motor function) (limited exam to video visit)          [] No gaze palsy        [] Abnormal-         Skin:        [x] No significant exanthematous lesions or discoloration noted on facial skin         [] Abnormal-            Psychiatric:       [x] Normal Affect [] No Hallucinations        [] Abnormal-     Other pertinent observable physical exam findings-patient had congested wet sounding cough during today's video visit. Patient also appeared to be some short of breath while talking. Due to this being a TeleHealth encounter, evaluation of the following organ systems is limited: Vitals/Constitutional/EENT/Resp/CV/GI//MS/Neuro/Skin/Heme-Lymph-Imm. ASSESSMENT/PLAN:  1. SOB (shortness of breath)  See above HPI for symptoms and onset.   Patient appeared to be some short of breath during conversation today. Patient also had congested/wet sounding cough during video visit. Do have concern for fluid overload given the patient's current state. Patient was able to use her camera to show me significant pitting edema noted in bilateral lower extremities. I do have concern for CHF and/or pulmonary congestion. I am going to send the patient to [facility] for updated blood testing, see below as well as undergo updated chest x-ray. I explained the patient pending results may need to send her back to the ED for further evaluation. If x-ray and laboratory results negative will discuss plan with patient moving forward. The patient was provided with pulmonary referral.  Could consider diuretic therapy. Patient had recent low sodium but we are rechecking today. Will need to be cautious with diuretic therapy given patient's recent hyponatremia. Also consider cardiology consult. - Comprehensive Metabolic Panel; Future  - D-Dimer, Quantitative; Future  - XR CHEST STANDARD (2 VW); Future  - Brain Natriuretic Peptide; Future    2. Chest congestion  See above #1  - Comprehensive Metabolic Panel; Future  - XR CHEST STANDARD (2 VW); Future  - Brain Natriuretic Peptide; Future    3. Pulmonary vascular congestion  See above #1  - José Mcmahan MD, [facility]    4. Pulmonary emphysema, unspecified emphysema type ([facility] [address]  See above #1  - José Mcmahan MD, Pulmonary, [place]    5. Bilateral lower extremity edema  See above #1  - Comprehensive Metabolic Panel; Future  - XR CHEST STANDARD (2 VW); Future  - Brain Natriuretic Peptide; Future    6. Thyroid nodule  Patient had incidental findings of thyroid nodule as above in HPI. We will order thyroid ultrasound to be done. - US THYROID; Future    No follow-ups on file. 35 minutes spent on patient care today. An  electronic signature was used to authenticate this note.   This document was prepared by a combination of typing and transcription through a voice recognition software. --Melissa Morales, GUY - CNP on 1/9/2023 at 11:17 AM        Pursuant to the emergency declaration under the [address] [place] [address], [facility] waiver authority and the [employer] Act, this Virtual  Visit was conducted, with patient's consent, to reduce the patient's risk of exposure to COVID-19 and provide continuity of care for an established patient. Services were provided through a video synchronous discussion virtually to substitute for in-person clinic visit.

## 2023-01-13 ENCOUNTER — TELEPHONE (OUTPATIENT)
Dept: FAMILY MEDICINE CLINIC | Age: 63
End: 2023-01-13

## 2023-01-13 NOTE — TELEPHONE ENCOUNTER
Pt called from hospital and is worried about being sent home to early. She is bloated still and still SOB. She said they did an ECHO and she just wants to talk to you.

## 2023-01-16 NOTE — TELEPHONE ENCOUNTER
Just now seeing this message. I assumed may be discharged by now if was concerned about being sent home around that time of message?

## 2023-01-18 LAB
CREAT SERPL-MCNC: 0.64 MG/DL
POTASSIUM (K+): 4.1

## 2023-01-19 ENCOUNTER — TELEPHONE (OUTPATIENT)
Dept: FAMILY MEDICINE CLINIC | Age: 63
End: 2023-01-19

## 2023-01-19 ENCOUNTER — OFFICE VISIT (OUTPATIENT)
Dept: FAMILY MEDICINE CLINIC | Age: 63
End: 2023-01-19

## 2023-01-19 VITALS
SYSTOLIC BLOOD PRESSURE: 86 MMHG | WEIGHT: 97.8 LBS | DIASTOLIC BLOOD PRESSURE: 50 MMHG | BODY MASS INDEX: 17.32 KG/M2 | HEART RATE: 72 BPM

## 2023-01-19 DIAGNOSIS — I10 PRIMARY HYPERTENSION: Primary | ICD-10-CM

## 2023-01-19 DIAGNOSIS — I10 PRIMARY HYPERTENSION: ICD-10-CM

## 2023-01-19 DIAGNOSIS — I50.9 CHRONIC CONGESTIVE HEART FAILURE, UNSPECIFIED HEART FAILURE TYPE (HCC): ICD-10-CM

## 2023-01-19 DIAGNOSIS — Z09 HOSPITAL DISCHARGE FOLLOW-UP: Primary | ICD-10-CM

## 2023-01-19 RX ORDER — FUROSEMIDE 20 MG/1
TABLET ORAL
COMMUNITY
Start: 2023-01-14

## 2023-01-19 RX ORDER — LEVOFLOXACIN 500 MG/1
TABLET, FILM COATED ORAL
COMMUNITY
Start: 2023-01-16

## 2023-01-19 RX ORDER — POTASSIUM CHLORIDE 750 MG/1
TABLET, FILM COATED, EXTENDED RELEASE ORAL
COMMUNITY
Start: 2023-01-14

## 2023-01-19 ASSESSMENT — ENCOUNTER SYMPTOMS
COUGH: 1
ABDOMINAL DISTENTION: 0
ABDOMINAL PAIN: 1
SHORTNESS OF BREATH: 1

## 2023-01-19 NOTE — PROGRESS NOTES
Chief Complaint   Patient presents with    Follow-Up from Hospital     Respiratory failure        HPI:  Dk Redmond is a 57 y.o. (: 1960) here today hospital follow up from [facility] for Flu A and Pneumonia 22 per pt. Do not have records for viewing. Went back to ED a week later and was admitted again for 5 days per pt. At that time had fluid in legs had CHF. I saw pt on 23 and sent pt back to ED after getting Chest XR result back in addition to elevated Pro BNP and D-Dimer. Still today pt feels swelling is legs has improved but still with edema. Does not wear compression stockings. Is on Lasix 20mg daily. Has had a tendency to run lower K and NA. Feels breathing has gotten better from prior. Is on O2 NC 1L. Pt states is fairly well at this time. Is not following Pulm at this time. I gave pt ref to see . is supposed to f/u. BP low today at 86/50. Had recent EGD with . states had no abnormal findings. Is doing Chemo for metastatic colon cancer. Is currently going every 2 weeks at this time. Has only had one tx to date. Is seeing Catia Manual. Patient's medications, allergies, past medical, surgical, social and family histories were reviewed and updated asappropriate. ROS:  Review of Systems   Constitutional:  Positive for fatigue. HENT:  Positive for congestion. Respiratory:  Positive for cough and shortness of breath. Cardiovascular:  Positive for leg swelling. Gastrointestinal:  Positive for abdominal pain. Negative for abdominal distention. Skin:  Positive for pallor. Neurological:  Positive for weakness. Negative for headaches. Psychiatric/Behavioral: Negative. Prior to Visit Medications    Medication Sig Taking?  Authorizing Provider   furosemide (LASIX) 20 MG tablet  Yes Historical Provider, MD   levoFLOXacin (LEVAQUIN) 500 MG tablet  Yes Historical Provider, MD   potassium chloride (KLOR-CON) 10 MEQ extended release tablet  Yes Historical Provider, MD   gabapentin (NEURONTIN) 300 MG capsule Take 300 mg by mouth 3 times daily. Yes Historical Provider, MD   morphine (KATE) 50 MG extended release capsule Take 50 mg by mouth in the morning and at bedtime.  Yes Historical Provider, MD   vitamin D (ERGOCALCIFEROL) 1.25 MG (15590 UT) CAPS capsule once a week Yes Historical Provider, MD   pantoprazole (PROTONIX) 40 MG tablet Take 40 mg by mouth daily Yes Historical Provider, MD   ESTRADIOL ACETATE PO Take 1 tablet by mouth at bedtime Yes Historical Provider, MD   magnesium oxide (MAG-OX) 400 MG tablet Take 400 mg by mouth daily  Patient not taking: Reported on 1/19/2023  Historical Provider, MD   ondansetron (ZOFRAN-ODT) 8 MG TBDP disintegrating tablet Place 8 mg under the tongue every 8 hours as needed  Historical Provider, MD   Multiple Vitamins-Minerals (THERAPEUTIC MULTIVITAMIN-MINERALS) tablet Take 1 tablet by mouth daily  Patient not taking: Reported on 1/19/2023  Historical Provider, MD   ferrous sulfate (IRON 325) 325 (65 Fe) MG tablet Take 325 mg by mouth daily (with breakfast)  Patient not taking: No sig reported  Historical Provider, MD   b complex vitamins capsule Take 1 capsule by mouth daily  Patient not taking: No sig reported  Historical Provider, MD   Calcium-Cholecalciferol-Zinc (VIACTIV CALCIUM IMMUNE) 650-20-5.5 MG-MCG-MG CHEW Take by mouth  Patient not taking: No sig reported  Historical Provider, MD   Magic Mouthwash (MIRACLE MOUTHWASH) Swish and spit 5 mLs 4 times daily as needed for Irritation  Patient not taking: Reported on 1/9/2023  Frantz Clark MD       Allergies   Allergen Reactions    Codeine     Other Diarrhea     5FU chemo drug     Penicillins     Sulfa Antibiotics        OBJECTIVE:    BP (!) 86/50   Pulse 72   Wt 97 lb 12.8 oz (44.4 kg)   BMI 17.32 kg/m²     BP Readings from Last 2 Encounters:   01/19/23 (!) 86/50   09/01/22 130/72       Wt Readings from Last 3 Encounters:   01/19/23 97 lb 12.8 oz (44.4 kg)   09/01/22 101 lb (45.8 kg)   05/20/22 103 lb (46.7 kg)       Physical Exam  Constitutional:       Appearance: Normal appearance. Cardiovascular:      Rate and Rhythm: Normal rate and regular rhythm. Pulses: Normal pulses. Heart sounds: Normal heart sounds. No murmur heard. Pulmonary:      Breath sounds: No wheezing or rhonchi. Comments: On O2 1LNC    Diminished bilaterally in all lobes. Abdominal:      General: Bowel sounds are normal.   Musculoskeletal:      Cervical back: Neck supple. Right lower leg: Edema present. Left lower leg: Edema present. Comments: General weakness    Skin:     General: Skin is warm. Capillary Refill: Capillary refill takes less than 2 seconds. Neurological:      General: No focal deficit present. Mental Status: She is alert and oriented to person, place, and time. Psychiatric:         Mood and Affect: Mood normal.         Behavior: Behavior normal.         ASSESSMENT/PLAN:    1. Hospital discharge follow-up  See above HPI. At this time pt here current condition fairly well. Patient following oncology as recommended. Patient was provided with cardiology referral for evaluation given her recent episode of CHF. Patient elevated BNP. I discussed with the patient at this time given her circumstance of metastatic colon cancer we need to ensure we are set up with appropriate specialist as needed. Patient will follow up with pulmonary for pulmonary congestion as well as current lung issues which are requiring continuous O2 treatment. Patient will be evaluated by cardiology soon as possible and was provided with referral today. Patient will continue with hematology follow-up. I did discuss the possibility of PEG tube placement for nutrition support if unable to tolerate chemo treatments and replace nutrition status posttreatment. Patient will address this with her oncologist when she sees next week.       2. Chronic congestive heart failure, unspecified heart failure type (HCC)  See above #1.  Patient elected to see cardiology at [facility].  Patient had recent episode of CHF and was admitted to [facility].  Patient states was never provided cardiology referral upon discharge.  Patient still with bilateral lower extremity edema +1 noted today bilaterally.  On Lasix therapy 20 mg daily.  I am not going to increase Lasix at this time given patient tendency to be low in sodium and potassium on recent lab results.        30-minute spent on patient care today    This document was prepared by a combination of typing and transcription through a voice recognition software.    GUY Gonzalez - CNP    - External Referral To Cardiology    35 min spent on pt care.

## 2023-01-19 NOTE — TELEPHONE ENCOUNTER
Pt states she went to the pharmacy and they need an order for the compression hose. They need to know which ones you want?

## 2023-01-24 LAB
CREAT SERPL-MCNC: 0.68 MG/DL
POTASSIUM (K+): 4.2

## 2023-01-25 DIAGNOSIS — E04.2 MULTIPLE THYROID NODULES: Primary | ICD-10-CM

## 2023-01-25 RX ORDER — FUROSEMIDE 20 MG/1
10 TABLET ORAL DAILY
Qty: 30 TABLET | Refills: 5 | Status: SHIPPED | OUTPATIENT
Start: 2023-01-25

## 2023-01-25 RX ORDER — POTASSIUM CHLORIDE 750 MG/1
10 TABLET, FILM COATED, EXTENDED RELEASE ORAL DAILY
Qty: 30 TABLET | Refills: 5 | Status: SHIPPED | OUTPATIENT
Start: 2023-01-25

## 2023-01-25 NOTE — TELEPHONE ENCOUNTER
Pt calling she seen Dr Venessa Harris today and he asked that you fill these two meds.  Please send to [address]

## 2023-01-26 LAB — REFERENCE LAB: NORMAL

## 2023-01-27 DIAGNOSIS — Z09 HOSPITAL DISCHARGE FOLLOW-UP: Primary | ICD-10-CM

## 2023-01-27 LAB — TSH SERPL DL<=0.05 MIU/L-ACNC: 1.63 UIU/ML

## 2023-01-30 DIAGNOSIS — E04.1 THYROID NODULE: Primary | ICD-10-CM

## 2023-02-09 ENCOUNTER — OFFICE VISIT (OUTPATIENT)
Dept: FAMILY MEDICINE CLINIC | Age: 63
End: 2023-02-09

## 2023-02-09 VITALS
OXYGEN SATURATION: 97 % | SYSTOLIC BLOOD PRESSURE: 132 MMHG | HEART RATE: 80 BPM | BODY MASS INDEX: 16.83 KG/M2 | DIASTOLIC BLOOD PRESSURE: 78 MMHG | WEIGHT: 95 LBS

## 2023-02-09 DIAGNOSIS — N39.0 URINARY TRACT INFECTION WITH HEMATURIA, SITE UNSPECIFIED: ICD-10-CM

## 2023-02-09 DIAGNOSIS — R31.9 URINARY TRACT INFECTION WITH HEMATURIA, SITE UNSPECIFIED: ICD-10-CM

## 2023-02-09 DIAGNOSIS — Z09 HOSPITAL DISCHARGE FOLLOW-UP: Primary | ICD-10-CM

## 2023-02-09 ASSESSMENT — ENCOUNTER SYMPTOMS: SHORTNESS OF BREATH: 1

## 2023-02-09 NOTE — PROGRESS NOTES
Chief Complaint   Patient presents with    Follow-Up from Hospital       HPI:  Annalee Halsted is a 57 y.o. (: 1960) here today   for hospital discharge follow-up. Patient was seen at [facility] ED on 2023 and diagnosed with UTI. With Levaquin 500 mg by mouth once daily x7 days. Upon discharge for her symptoms. Urinalysis can be seen in labs link. Also diagnosed with gastroenteritis, clinical dehydration, generalized weakness. Patient does have metastatic colon cancer and is following  at the cancer center. Sees again this coming Tuesday. Recently also established with cardiology at [facility].  Patient has CHF which is now being managed by cardio specialist.    Patient's medications, allergies, past medical, surgical, social and family histories were reviewed and updated as appropriate. ROS:  Review of Systems   Constitutional:  Positive for fatigue. HENT: Negative. Respiratory:  Positive for shortness of breath. Wears O2     Cardiovascular: Negative. Genitourinary:  Positive for frequency, hematuria and urgency. Negative for flank pain. Musculoskeletal:  Positive for arthralgias. Neurological: Negative. Psychiatric/Behavioral: Negative. Microscopic Examination (no units)   Date Value   2022 Comment       Past Medical History:   Diagnosis Date    Hypertension     Malignant neoplasm of ascending colon ([address]        Family History   Problem Relation Age of Onset    Cancer Mother     Cancer Father     Cancer Sister        Social History     Socioeconomic History    Marital status:       Spouse name: Not on file    Number of children: Not on file    Years of education: Not on file    Highest education level: Not on file   Occupational History    Not on file   Tobacco Use    Smoking status: Every Day     Packs/day: 1.00     Years: 47.00     Pack years: 47.00     Types: Cigarettes    Smokeless tobacco: Never   Vaping Use    Vaping Use: Never used   Substance and Sexual Activity    Alcohol use: Yes     Comment: seldom    Drug use: Never    Sexual activity: Not on file   Other Topics Concern    Not on file   Social History Narrative    Not on file     Social Determinants of Health     Financial Resource Strain: Not on file   Food Insecurity: Not on file   Transportation Needs: Not on file   Physical Activity: Not on file   Stress: Not on file   Social Connections: Not on file   Intimate Partner Violence: Not on file   Housing Stability: Not on file       Prior to Visit Medications    Medication Sig Taking? Authorizing Provider   furosemide (LASIX) 20 MG tablet Take 0.5 tablets by mouth daily Yes GUY Peterson CNP   potassium chloride (KLOR-CON) 10 MEQ extended release tablet Take 1 tablet by mouth daily Yes GUY Peterson CNP   levoFLOXacin (LEVAQUIN) 500 MG tablet Take 500 mg by mouth daily Yes Historical Provider, MD   gabapentin (NEURONTIN) 300 MG capsule Take 300 mg by mouth 3 times daily. Yes Historical Provider, MD   Morphine Sulfate ER 15 MG T12A Take 15 mg by mouth in the morning and at bedtime.  Yes Historical Provider, MD   vitamin D (ERGOCALCIFEROL) 1.25 MG (88238 UT) CAPS capsule once a week Yes Historical Provider, MD   pantoprazole (PROTONIX) 40 MG tablet Take 40 mg by mouth daily Yes Historical Provider, MD   ondansetron (ZOFRAN-ODT) 8 MG TBDP disintegrating tablet Place 8 mg under the tongue every 8 hours as needed Yes Historical Provider, MD   ESTRADIOL ACETATE PO Take 1 tablet by mouth at bedtime Yes Historical Provider, MD       Allergies   Allergen Reactions    Codeine     Other Diarrhea     5FU chemo drug     Penicillins     Sulfa Antibiotics        OBJECTIVE:    /78   Pulse 80   Wt 95 lb (43.1 kg)   SpO2 97%   BMI 16.83 kg/m²     BP Readings from Last 2 Encounters:   02/09/23 132/78   01/19/23 (!) 86/50       Wt Readings from Last 3 Encounters:   02/09/23 95 lb (43.1 kg)   01/19/23 97 lb 12.8 oz (44.4 kg)   09/01/22 101 lb (45.8 kg)       Physical Exam  Constitutional:       Appearance: Normal appearance. Cardiovascular:      Rate and Rhythm: Normal rate and regular rhythm. Pulses: Normal pulses. Heart sounds: Normal heart sounds. No murmur heard. Pulmonary:      Breath sounds: No wheezing. Comments: On O2  Abdominal:      General: Bowel sounds are normal.   Musculoskeletal:      Comments: General weakness     Skin:     Capillary Refill: Capillary refill takes less than 2 seconds. Neurological:      General: No focal deficit present. Mental Status: She is alert and oriented to person, place, and time. Psychiatric:         Mood and Affect: Mood normal.         Behavior: Behavior normal.         ASSESSMENT/PLAN:    1. Hospital discharge follow-up  See above HPI. Patient was seen at [facility] for UTI. Overall slightly better but still with hematuria noted in blood per patient. Recommend the patient continue with Levaquin as ordered and follow-up with office if symptoms fail to improve or worsen. 2. Urinary tract infection with hematuria, site unspecified  If patient still with residual UTI symptoms such as frequency, urgency, burning, hematuria, flank pain, low abdominal cramping once completed with Levaquin treatment for UTI we can retest urine to assess for residual infection. Patient advised to follow-up with office if symptoms fail to improve or worsen    30 min spent on pt care. This document was prepared by a combination of typing and transcription through a voice recognition software.        GUY Luis - CNP

## 2023-02-15 DIAGNOSIS — N39.0 URINARY TRACT INFECTION WITH HEMATURIA, SITE UNSPECIFIED: Primary | ICD-10-CM

## 2023-02-15 DIAGNOSIS — R31.9 URINARY TRACT INFECTION WITH HEMATURIA, SITE UNSPECIFIED: Primary | ICD-10-CM

## 2023-02-15 RX ORDER — NITROFURANTOIN 25; 75 MG/1; MG/1
100 CAPSULE ORAL 2 TIMES DAILY
Qty: 20 CAPSULE | Refills: 0 | Status: SHIPPED | OUTPATIENT
Start: 2023-02-15 | End: 2023-02-25

## 2023-02-28 ENCOUNTER — ANESTHESIA EVENT (OUTPATIENT)
Dept: ENDOSCOPY | Age: 63
End: 2023-02-28
Payer: COMMERCIAL

## 2023-02-28 NOTE — PROGRESS NOTES
PAT completed with patient orders placed per MD, patient aware of 0830 arrival time on 03/01/2023 at [facility] states her brother Roxanne Rodas will be  and caregiver day of procedure. Eri Aquino RN

## 2023-02-28 NOTE — PROGRESS NOTES
..1.  Do not eat or drink anything after 12 midnight prior to surgery. This includes no water, chewing gum or mints, except for bowel prep complete per MD.  2.  Take the following pills with a small sip of water on the morning of surgery 03/01/2023.  3.  Aspirin, Ibuprofen, Advil, Naproxen, Vitamin E and other Anti-inflammatory products should be stopped for 5 days before surgery or as directed by your physician. 4.  Check with your doctor regarding stopping Plavix, Coumadin, Lovenox, Fragmin or other blood thinners. 5.  Do not smoke and do not drink alcoholic beverages 24 hours prior to surgery. This includes NA Beer. 6.  You may brush your teeth and gargle the morning of surgery. DO NOT SWALLOW WATER.  7.  You MUST make arrangements for a responsible adult to take you home after your surgery. You will not be allowed to leave alone or drive yourself home. It is strongly suggested someone stay with you the first 24 hours. Your surgery will be cancelled if you do not have a ride home. 8.  A parent/legal guardian must accompany a child scheduled for surgery and plan to stay at the hospital until the child is discharged. Please do not bring other children with you. 9.  Please wear simple, loose fitting clothing to the hospital.  Cleophus Silver not bring valuables ( money, credit cards, checkbooks, etc.)  Do not wear any makeup (including no eye makeup) or nail polish on your fingers or toes. 10.  Do not wear any jewelry or piercing on the day of surgery. All body piercing jewelry must be removed. 11.  If you have dentures, they will be removed before going to the OR; we will provide you a container. If you wear contact lenses or glasses, they will be removed; please bring a case for them.   15.  Notify your Surgeon if you develop any illness between now and surgery time; cough, cold, fever, sore throat, nausea, vomiting, etc.  Please notify your surgeon if you experience dizziness, shortness of breath or blurred vision between now and the time of your surgery. To provide excellent care, visitors will be limited to two in a room at any given time. Please no children under the age of 15 in the surgical department.

## 2023-03-01 ENCOUNTER — ANESTHESIA (OUTPATIENT)
Dept: ENDOSCOPY | Age: 63
End: 2023-03-01
Payer: COMMERCIAL

## 2023-03-01 ENCOUNTER — HOSPITAL ENCOUNTER (OUTPATIENT)
Age: 63
Setting detail: OUTPATIENT SURGERY
Discharge: HOME | End: 2023-03-01
Attending: INTERNAL MEDICINE | Admitting: INTERNAL MEDICINE
Payer: COMMERCIAL

## 2023-03-01 VITALS
TEMPERATURE: 97.7 F | WEIGHT: 94 LBS | OXYGEN SATURATION: 99 % | HEART RATE: 72 BPM | HEIGHT: 63 IN | BODY MASS INDEX: 16.66 KG/M2 | RESPIRATION RATE: 11 BRPM | DIASTOLIC BLOOD PRESSURE: 52 MMHG | SYSTOLIC BLOOD PRESSURE: 107 MMHG

## 2023-03-01 LAB
GLUCOSE BLD-MCNC: 91 MG/DL (ref 70–99)
PERFORMED ON: NORMAL

## 2023-03-01 PROCEDURE — 3609013300 HC EGD TUBE PLACEMENT: Performed by: INTERNAL MEDICINE

## 2023-03-01 PROCEDURE — 7100000010 HC PHASE II RECOVERY - FIRST 15 MIN: Performed by: INTERNAL MEDICINE

## 2023-03-01 PROCEDURE — 2580000003 HC RX 258: Performed by: NURSE ANESTHETIST, CERTIFIED REGISTERED

## 2023-03-01 PROCEDURE — 6360000002 HC RX W HCPCS: Performed by: INTERNAL MEDICINE

## 2023-03-01 PROCEDURE — 6370000000 HC RX 637 (ALT 250 FOR IP): Performed by: ANESTHESIOLOGY

## 2023-03-01 PROCEDURE — 3700000000 HC ANESTHESIA ATTENDED CARE: Performed by: INTERNAL MEDICINE

## 2023-03-01 PROCEDURE — 2580000003 HC RX 258: Performed by: INTERNAL MEDICINE

## 2023-03-01 PROCEDURE — 2709999900 HC NON-CHARGEABLE SUPPLY: Performed by: INTERNAL MEDICINE

## 2023-03-01 PROCEDURE — 7100000011 HC PHASE II RECOVERY - ADDTL 15 MIN: Performed by: INTERNAL MEDICINE

## 2023-03-01 PROCEDURE — 3700000001 HC ADD 15 MINUTES (ANESTHESIA): Performed by: INTERNAL MEDICINE

## 2023-03-01 PROCEDURE — 6360000002 HC RX W HCPCS: Performed by: NURSE ANESTHETIST, CERTIFIED REGISTERED

## 2023-03-01 RX ORDER — OXYCODONE HYDROCHLORIDE 5 MG/1
10 TABLET ORAL PRN
Status: COMPLETED | OUTPATIENT
Start: 2023-03-01 | End: 2023-03-01

## 2023-03-01 RX ORDER — OXYCODONE HYDROCHLORIDE 5 MG/1
5 TABLET ORAL PRN
Status: COMPLETED | OUTPATIENT
Start: 2023-03-01 | End: 2023-03-01

## 2023-03-01 RX ORDER — SODIUM CHLORIDE 0.9 % (FLUSH) 0.9 %
5-40 SYRINGE (ML) INJECTION EVERY 12 HOURS SCHEDULED
Status: DISCONTINUED | OUTPATIENT
Start: 2023-03-01 | End: 2023-03-01 | Stop reason: HOSPADM

## 2023-03-01 RX ORDER — PROPOFOL 10 MG/ML
INJECTION, EMULSION INTRAVENOUS PRN
Status: DISCONTINUED | OUTPATIENT
Start: 2023-03-01 | End: 2023-03-01 | Stop reason: SDUPTHER

## 2023-03-01 RX ORDER — SODIUM CHLORIDE 9 MG/ML
INJECTION, SOLUTION INTRAVENOUS PRN
Status: DISCONTINUED | OUTPATIENT
Start: 2023-03-01 | End: 2023-03-01 | Stop reason: HOSPADM

## 2023-03-01 RX ORDER — SODIUM CHLORIDE, SODIUM LACTATE, POTASSIUM CHLORIDE, CALCIUM CHLORIDE 600; 310; 30; 20 MG/100ML; MG/100ML; MG/100ML; MG/100ML
INJECTION, SOLUTION INTRAVENOUS CONTINUOUS
Status: DISCONTINUED | OUTPATIENT
Start: 2023-03-01 | End: 2023-03-01 | Stop reason: HOSPADM

## 2023-03-01 RX ORDER — ONDANSETRON 2 MG/ML
4 INJECTION INTRAMUSCULAR; INTRAVENOUS
Status: DISCONTINUED | OUTPATIENT
Start: 2023-03-01 | End: 2023-03-01 | Stop reason: HOSPADM

## 2023-03-01 RX ORDER — SODIUM CHLORIDE, SODIUM LACTATE, POTASSIUM CHLORIDE, CALCIUM CHLORIDE 600; 310; 30; 20 MG/100ML; MG/100ML; MG/100ML; MG/100ML
INJECTION, SOLUTION INTRAVENOUS CONTINUOUS PRN
Status: DISCONTINUED | OUTPATIENT
Start: 2023-03-01 | End: 2023-03-01 | Stop reason: SDUPTHER

## 2023-03-01 RX ORDER — SODIUM CHLORIDE 0.9 % (FLUSH) 0.9 %
5-40 SYRINGE (ML) INJECTION PRN
Status: DISCONTINUED | OUTPATIENT
Start: 2023-03-01 | End: 2023-03-01 | Stop reason: HOSPADM

## 2023-03-01 RX ORDER — LABETALOL HYDROCHLORIDE 5 MG/ML
5 INJECTION, SOLUTION INTRAVENOUS EVERY 10 MIN PRN
Status: DISCONTINUED | OUTPATIENT
Start: 2023-03-01 | End: 2023-03-01 | Stop reason: HOSPADM

## 2023-03-01 RX ORDER — DIPHENHYDRAMINE HYDROCHLORIDE 50 MG/ML
12.5 INJECTION INTRAMUSCULAR; INTRAVENOUS
Status: DISCONTINUED | OUTPATIENT
Start: 2023-03-01 | End: 2023-03-01 | Stop reason: HOSPADM

## 2023-03-01 RX ADMIN — DEXTROSE MONOHYDRATE 1000 MG: 5 INJECTION INTRAVENOUS at 10:20

## 2023-03-01 RX ADMIN — OXYCODONE 5 MG: 5 TABLET ORAL at 11:13

## 2023-03-01 RX ADMIN — PROPOFOL 25 MG: 10 INJECTION, EMULSION INTRAVENOUS at 10:24

## 2023-03-01 RX ADMIN — PROPOFOL 80 MG: 10 INJECTION, EMULSION INTRAVENOUS at 10:20

## 2023-03-01 RX ADMIN — SODIUM CHLORIDE, POTASSIUM CHLORIDE, SODIUM LACTATE AND CALCIUM CHLORIDE: 600; 310; 30; 20 INJECTION, SOLUTION INTRAVENOUS at 10:15

## 2023-03-01 RX ADMIN — PROPOFOL 25 MG: 10 INJECTION, EMULSION INTRAVENOUS at 10:28

## 2023-03-01 ASSESSMENT — PAIN DESCRIPTION - ORIENTATION
ORIENTATION: LEFT
ORIENTATION: LOWER;LEFT
ORIENTATION: LEFT

## 2023-03-01 ASSESSMENT — PAIN DESCRIPTION - DESCRIPTORS
DESCRIPTORS: OTHER (COMMENT)
DESCRIPTORS: ACHING;STABBING
DESCRIPTORS: ACHING

## 2023-03-01 ASSESSMENT — PAIN DESCRIPTION - PAIN TYPE: TYPE: OTHER (COMMENT)

## 2023-03-01 ASSESSMENT — PAIN - FUNCTIONAL ASSESSMENT: PAIN_FUNCTIONAL_ASSESSMENT: 0-10

## 2023-03-01 ASSESSMENT — PAIN DESCRIPTION - LOCATION
LOCATION: ABDOMEN

## 2023-03-01 ASSESSMENT — PAIN DESCRIPTION - FREQUENCY: FREQUENCY: OTHER (COMMENT)

## 2023-03-01 ASSESSMENT — PAIN SCALES - GENERAL
PAINLEVEL_OUTOF10: 0
PAINLEVEL_OUTOF10: 6
PAINLEVEL_OUTOF10: 0

## 2023-03-01 ASSESSMENT — PAIN DESCRIPTION - ONSET: ONSET: OTHER (COMMENT)

## 2023-03-01 NOTE — ANESTHESIA POSTPROCEDURE EVALUATION
Department of Anesthesiology  Postprocedure Note    Patient: Ernestine Sibley  MRN: [id number]  YOB: 1960  Date of evaluation: 3/1/2023      Procedure Summary     Date: 03/01/23 Room / Location: [place] [address] / [facility]    Anesthesia Start: 1015 Anesthesia Stop: 1033    Procedure: EGD/PEG W/ANES. (9:30) Diagnosis:       Weight loss      (WEIGHT LOSS)    Surgeons: Barbara Doyle MD Responsible Provider: Krystle Balderrama MD    Anesthesia Type: MAC ASA Status: 4          Anesthesia Type: No value filed.     Cruz Phase I: Cruz Score: 9    Cruz Phase II: Cruz Score: 8      Anesthesia Post Evaluation    Comments: Postoperative Anesthesia Note    Name:    Ernestine Sibley  MRN:      [id number]    Patient Vitals in the past 12 hrs:  03/01/23 1040, BP:(!) 89/44, Temp:97.7 °F (36.5 °C), Temp src:Infrared, Pulse:66, Resp:13, SpO2:97 %  03/01/23 0931, BP:115/77, Temp:98.5 °F (36.9 °C), Temp src:Temporal, Pulse:87, Resp:18, SpO2:98 %, Height:5' 3\" (1.6 m), Weight:94 lb (42.6 kg)     LABS:    CBC  Lab Results       Component                Value               Date/Time                  WBC                      11.6 (H)            02/07/2023 09:23 AM        HGB                      9.0 (L)             02/07/2023 09:23 AM        HCT                      29.2 (L)            02/07/2023 09:23 AM        PLT                      318                 02/07/2023 09:23 AM        PLT                      270                 06/30/2022 12:00 AM   RENAL  Lab Results       Component                Value               Date/Time                  NA                       134 (L)             02/07/2023 09:23 AM        K                        3.9                 02/07/2023 09:23 AM        K                        4.8                 05/18/2022 11:49 AM        CL                       99                  02/07/2023 09:23 AM        CO2                      31 (H)              02/07/2023 09:23 AM BUN                      21 (H)              02/07/2023 09:23 AM        CREATININE               0.7                 02/07/2023 09:23 AM        GLUCOSE                  97                  06/30/2022 12:00 AM   COAGS  No results found for: PROTIME, INR, APTT    Intake & Output:  @82BFYT@    Nausea & Vomiting:  No    Level of Consciousness:  Awake    Pain Assessment:  Adequate analgesia    Anesthesia Complications:  No apparent anesthetic complications    SUMMARY      Vital signs stable  OK to discharge from Stage I post anesthesia care.   Care transferred from Anesthesiology department on discharge from perioperative area

## 2023-03-01 NOTE — ANESTHESIA PRE PROCEDURE
Department of Anesthesiology  Preprocedure Note       Name:  Marlen Mcduffie   Age:  62 y.o.  :  1960                                          MRN:  [id number]         Date:  3/1/2023      Surgeon: Surgeon(s):  Messi Taylor MD    Procedure: Procedure(s):  EGD/PEG W/ANES. (9:30)    Medications prior to admission:   Prior to Admission medications    Medication Sig Start Date End Date Taking? Authorizing Provider   furosemide (LASIX) 20 MG tablet Take 0.5 tablets by mouth daily 23   GUY Gonzalez CNP   potassium chloride (KLOR-CON) 10 MEQ extended release tablet Take 1 tablet by mouth daily 23   GUY Gonzalez CNP   gabapentin (NEURONTIN) 300 MG capsule Take 300 mg by mouth 3 times daily.    Historical Provider, MD   Morphine Sulfate ER 15 MG T12A Take 15 mg by mouth in the morning and at bedtime.    Historical Provider, MD   vitamin D (ERGOCALCIFEROL) 1.25 MG (89750 UT) CAPS capsule once a week 22   Historical Provider, MD   pantoprazole (PROTONIX) 40 MG tablet Take 40 mg by mouth daily 22   Historical Provider, MD   ESTRADIOL ACETATE PO Take 1 tablet by mouth at bedtime    Historical Provider, MD       Current medications:    Current Facility-Administered Medications   Medication Dose Route Frequency Provider Last Rate Last Admin   • ceFAZolin (ANCEF) 1,000 mg in dextrose 5 % 50 mL IVPB (mini-bag)  1,000 mg IntraVENous Once Messi Taylor MD       • lactated ringers IV soln infusion   IntraVENous Continuous Messi Taylor MD           Allergies:    Allergies   Allergen Reactions   • Codeine    • Other Diarrhea     5FU chemo drug    • Penicillins    • Sulfa Antibiotics        Problem List:    Patient Active Problem List   Diagnosis Code   • Colon cancer (HCC) C18.9   • Severe protein-calorie malnutrition (HCC) E43   • Hypertension I10       Past Medical History:        Diagnosis Date   • Hypertension    • Malignant neoplasm of  ascending colon [facility])        Past Surgical History:        Procedure Laterality Date    HEMICOLECTOMY Right 5/10/2022    RIGHT COLECTOMY, PARTIAL DUODENECTOMY, SMALL BOWEL RESECTION, CHOLECYSTECTOMY, INSERTION OF FEEDING JEJUNOSTOMY TUBE performed by Charlett Cranker, MD at [address] [place] [address]         Social History:    Social History     Tobacco Use    Smoking status: Every Day     Packs/day: 0.50     Years: 47.00     Pack years: 23.50     Types: Cigarettes    Smokeless tobacco: Never   Substance Use Topics    Alcohol use: Yes     Comment: seldom                                Ready to quit: Not Answered  Counseling given: Not Answered      Vital Signs (Current):   Vitals:    02/28/23 1031 03/01/23 0931   Pulse:  87   Resp:  18   Temp:  98.5 °F (36.9 °C)   TempSrc:  Temporal   SpO2:  98%   Weight: 94 lb (42.6 kg) 94 lb (42.6 kg)   Height: 5' 3\" (1.6 m) 5' 3\" (1.6 m)                                              BP Readings from Last 3 Encounters:   02/09/23 132/78   01/19/23 (!) 86/50   09/01/22 130/72       NPO Status: Time of last liquid consumption: 0700                        Time of last solid consumption: 1800                        Date of last liquid consumption: 03/01/23                        Date of last solid food consumption: 02/28/23    BMI:   Wt Readings from Last 3 Encounters:   03/01/23 94 lb (42.6 kg)   02/09/23 95 lb (43.1 kg)   01/19/23 97 lb 12.8 oz (44.4 kg)     Body mass index is 16.65 kg/m².     CBC:   Lab Results   Component Value Date/Time    WBC 11.6 02/07/2023 09:23 AM    RBC 3.58 02/07/2023 09:23 AM    HGB 9.0 02/07/2023 09:23 AM    HCT 29.2 02/07/2023 09:23 AM    MCV 81.6 02/07/2023 09:23 AM    RDW 15.1 06/30/2022 12:00 AM     02/07/2023 09:23 AM     06/30/2022 12:00 AM       CMP:   Lab Results   Component Value Date/Time     02/07/2023 09:23 AM    K 3.9 02/07/2023 09:23 AM    K 4.8 05/18/2022 11:49 AM    CL 99 02/07/2023 09:23 AM CO2 31 02/07/2023 09:23 AM    BUN 21 02/07/2023 09:23 AM    CREATININE 0.7 02/07/2023 09:23 AM    GFRAA >60 05/20/2022 05:30 AM    AGRATIO 1.0 02/07/2023 09:23 AM    LABGLOM 84 02/07/2023 09:23 AM    LABGLOM >60 05/20/2022 05:30 AM    GLUCOSE 97 06/30/2022 12:00 AM    PROT 6.8 02/07/2023 09:23 AM    CALCIUM 9.3 02/07/2023 09:23 AM    BILITOT 0.4 02/07/2023 09:23 AM    ALKPHOS 113 02/07/2023 09:23 AM    AST 27 02/07/2023 09:23 AM    ALT 12 02/07/2023 09:23 AM       POC Tests:   Recent Labs     03/01/23  0934   POCGLU 91       Coags: No results found for: PROTIME, INR, APTT    HCG (If Applicable): No results found for: PREGTESTUR, PREGSERUM, HCG, HCGQUANT     ABGs: No results found for: PHART, PO2ART, BDS6INK, CGR2FSO, BEART, I3LISMCX     Type & Screen (If Applicable):  No results found for: LABABO, LABRH    Drug/Infectious Status (If Applicable):  No results found for: HIV, HEPCAB    COVID-19 Screening (If Applicable):   Lab Results   Component Value Date/Time    COVID19 NEGATIVE 02/07/2023 01:22 PM           Anesthesia Evaluation  Patient summary reviewed no history of anesthetic complications:   Airway: Mallampati: I  TM distance: >3 FB   Neck ROM: full  Mouth opening: > = 3 FB   Dental: normal exam         Pulmonary:normal exam  breath sounds clear to auscultation      (-) COPD, asthma and sleep apnea                           Cardiovascular:  Exercise tolerance: good (>4 METS),   (+) hypertension:,     (-) CAD,  angina and  MCKNIGHT      Rhythm: regular  Rate: normal                    Neuro/Psych:      (-) seizures and TIA           GI/Hepatic/Renal:        (-) GERD, liver disease and no renal disease      ROS comment: Malnourished, cachectic. Endo/Other:    (+) malignancy/cancer. (-) diabetes mellitus               Abdominal:             Vascular: negative vascular ROS.          Other Findings:           Anesthesia Plan      MAC     ASA 4     (I discussed with the patient the risks and benefits of PIV, anesthesia, IV Narcotics, PACU. All questions were answered the patient agrees with the plan and wishes to proceed)  Induction: intravenous.                             Keiko Munroe MD   3/1/2023

## 2023-03-01 NOTE — DISCHARGE INSTRUCTIONS
PATIENT INSTRUCTIONS  POST-SEDATION    Marlen Mcduffie          IMMEDIATELY FOLLOWING PROCEDURE:    Do not drive or operate machinery for the first twenty four hours after surgery. Do not make any important decisions for twenty four hours after surgery or while taking narcotic pain medications or sedatives. You should NOT BE LEFT UNATTENDED OR ALONE. A responsible adult should be with you for the rest of the day of your procedure and also during the night for your protection and safety. You may experience some light headedness. Rest at home with activity as tolerated. You may not need to go to bed, but it is important to rest for the next 24 hours. You should not engage in athletic sports such as basketball, volleyball, jogging, skating, or activities requiring refined motor skills for 24 hours. If you develop intractable nausea and vomiting or a severe headache please notify your doctor immediately. You are not expected to have any fever, but if you feel warm, take your temperature. If you have a fever 101 degrees or higher, call your doctor. If you have had an Endoscopy:   *Eat lightly for your first meal and gradually resume your normal / prescribed diet. *If you have a sore throat you may use lozenges, or salt water gargles. *If you have had a colonoscopy, do not expect a normal bowel movement for approximately three days due to the cleansing of the large intestine prior to colonoscopy. ONCE YOU ARE HOME, IF YOU SHOULD HAVE:  Difficulty in breathing, persistent nausea or vomiting, bleeding you feel is excessive, or pain that is unusual, increased abdominal bloating, or any swelling, fever / chills, call your physician. If you cannot contact your physician, but feel that your signs and symptoms need a physician's attention, go to the Emergency Department. FOLLOW-UP:    Please follow up with Dr. Nicole Victor as scheduled or needed.       Dr. Bebe Fenton office will call you with the biopsy findings. Call Dr. Robyn Cochran if there are any GI concerns. [phone]. ANESTHESIA DISCHARGE INSTRUCTIONS    You are under the influence of drugs- do not drink alcohol, drive a car, operate machinery(such as power tools, kitchen appliances, etc), sign legal documents, or make any important decisions for 24 hours (or while on pain medications). Children should not ride bikes or [place] or play on gym sets  for 24 hours after surgery. A responsible adult should be with you for 24 hours. Rest at home today- increase activity as tolerated. Progress slowly to a regular diet unless your physician has instructed you otherwise. Drink plenty of water. CALL YOUR DOCTOR IF YOU:  Have moderate to severe nausea or vomiting AND are unable to hold down fluids or prescribed medications. Have bright red bloody drainage from your dressing that won't stop oozing. Do not get relief with your pain medication    NORMAL (POSSIBLE) SIDE EFFECTS FROM ANESTHESIA:     Confusion, temporary memory loss, delayed reaction times in the first 24 hours  Lightheadedness, dizziness, difficulty focusing, blurred vision  Nausea/vomiting can happen  Shivering, feeling cold, sore throat, cough and muscle aches should stop within 24-48 hours  Trouble urinating - call your surgeon if it has been more than 8 hrs  Bruising or soreness at the IV site - call if it remains red, firm or there is drainage           FEMALES OF CHILDBEARING AGE WHO ARE TAKING BIRTH CONTROL PILLS:  You may have received a medication during your procedure that interferes with the  actions of birth control pills (Bridion or Emend). Use some other kind of birth control in addition to your pills, like a condom, for 1 month after your procedure to prevent unwanted pregnancy. The following instructions are to be followed if you have a known history or diagnosis of sleep apnea:   For all sleep apnea patients:   Sleep on your side or sitting up in a chair whenever possible, especially the first 24 hours after surgery. Use only medicines prescribed by your doctor. Do not drink alcohol. If you have a dental device to assist you while at rest, use it at all times for the first 24 hours. For patients using CPAP machines:   Use your CPAP machine during all periods of sleep as usual.   Use your CPAP machine during all periods of daytime rest while on pain medicines. ** Follow up with your primary care doctor for continued care. IF YOU DO NOT TAKE ALL OF YOUR NARCOTIC PAIN MEDICATION, please dispose of them responsibly. There are drop off boxes in the Emergency Departments 24/7 at both [facility] and [place]. If these locations are not convenient, other options for discarding them can be found at:  [email or web address]    Hospital or office staff may NOT accept any medications to drop off in the cabinet for you.

## 2023-03-01 NOTE — PROGRESS NOTES
[address] and spoke with Acosta Grey 148. Julisa explained that the Peg tube and feeding pump witll be set up and managed by home health care. Patient was out of pain medication, called to get that sent and filled. Patient is expressing that she is ALWAYS in pain now. Patient expressed her pain level is tolerable at this time of discharge. Discharge instructions given to her friend Bharat Ruiz who expressed understanding. I reinforced the fact that patient needs someone to watch over her today.

## 2023-03-01 NOTE — BRIEF OP NOTE
Brief Postoperative Note      Patient: Socorro Bird  YOB: 1960  MRN: [id number]    Date of Procedure: 3/1/2023    Pre-Op Diagnosis: WEIGHT LOSS    Post-Op Diagnosis:  20F PEG placed, external bumper at 2.5cm kena       Procedure(s):  EGD/PEG W/ANES.  (9:30)    Surgeon(s):  Roxane Galindo MD    Assistant:  * No surgical staff found *    Anesthesia: Monitor Anesthesia Care    Estimated Blood Loss (mL): Minimal    Complications: None    Specimens:   * No specimens in log *    Implants:  * No implants in log *      Drains:   Gastrostomy/Enterostomy/Jejunostomy Tube Gastrostomy LLQ 1 20 fr (Active)       [REMOVED] Gastrostomy/Enterostomy/Jejunostomy Tube Feeding Jejunostomy LUQ 1 14 fr (Removed)       Findings: 20F PEG placed, external bumper at 2.5cm kena    Electronically signed by Roxane Galindo MD on 3/1/2023 at 10:43 AM

## 2023-03-01 NOTE — H&P
History and Physical / Pre-Sedation Assessment    Patient:  Car Crane   :   1960     Intended Procedure:  EGD    HPI: 57year old female with history of history of metastatic colon cancer presents for EGD with PEG placement    Past Medical History:   Diagnosis Date    Hypertension     Malignant neoplasm of ascending colon [facility])      Past Surgical History:   Procedure Laterality Date    HEMICOLECTOMY Right 5/10/2022    RIGHT COLECTOMY, PARTIAL DUODENECTOMY, SMALL BOWEL RESECTION, CHOLECYSTECTOMY, INSERTION OF FEEDING JEJUNOSTOMY TUBE performed by Denisse Heaton MD at [place] [address]         Medications reviewed  Prior to Admission medications    Medication Sig Start Date End Date Taking? Authorizing Provider   furosemide (LASIX) 20 MG tablet Take 0.5 tablets by mouth daily 23   Clarine SaxapahawGUY woods - CNP   potassium chloride (KLOR-CON) 10 MEQ extended release tablet Take 1 tablet by mouth daily 23   Clarine Saxapahaw APRN - CNP   gabapentin (NEURONTIN) 300 MG capsule Take 300 mg by mouth 3 times daily. Historical Provider, MD   Morphine Sulfate ER 15 MG T12A Take 15 mg by mouth in the morning and at bedtime. Historical Provider, MD   vitamin D (ERGOCALCIFEROL) 1.25 MG (09062 UT) CAPS capsule once a week 22   Historical Provider, MD   pantoprazole (PROTONIX) 40 MG tablet Take 40 mg by mouth daily 22   Historical Provider, MD   ESTRADIOL ACETATE PO Take 1 tablet by mouth at bedtime    Historical Provider, MD        Allergies: Allergies   Allergen Reactions    Codeine     Other Diarrhea     5FU chemo drug     Penicillins     Sulfa Antibiotics        Nurses notes reviewed and agreed.     Physical Exam:  Vital Signs: /77   Pulse 87   Temp 98.5 °F (36.9 °C) (Temporal)   Resp 18   Ht 5' 3\" (1.6 m)   Wt 94 lb (42.6 kg)   SpO2 98%   BMI 16.65 kg/m²    Airway: Mallampati: II (soft palate, uvula, fauces visible)  Pulmonary:Normal  Cardiac:Normal  Abdomen:Normal    Pre-Procedure Assessment / Plan:  ASA: Class 3 - A patient with severe systemic disease that limits activity but is not incapacitating  Level of Sedation Plan: Moderate sedation  Post Procedure plan: Return to same level of care    I assessed the patient and find that the patient is in satisfactory condition to proceed with the planned procedure and sedation plan. I have explained the risk, benefits, and alternatives to the procedure; the patient understands and agrees to proceed.        Erica Galvez MD  3/1/2023

## 2023-03-01 NOTE — OP NOTE
[address]                 [address] [facility] [address] [facility]                                OPERATIVE REPORT    PATIENT NAME: Elli Babb                     :        1960  MED REC NO:   [id number]                          ROOM:  ACCOUNT NO:   [de-identified]                           ADMIT DATE: 2023  PROVIDER:     Yahir Dickinson MD    DATE OF PROCEDURE:  2023    PRE-PROCEDURE DIAGNOSES:  1. Weight loss. 2.  Metastatic colon cancer. PROCEDURE:  EGD with PEG tube placement. POSTPROCEDURE DIAGNOSES:  1. Normal EGD. 2.  Successful placement of 20-[place] PEG tube. 3.  External bolster at 2.5 cm kena. PROCEDURE INDICATIONS:  This is a 75-year-old female with history of  hypertension and complex history of metastatic colon cancer, presents  with failure to thrive, in need of EGD with PEG tube placement for  nutritional support. MEDICATIONS:  MAC per Anesthesia. PROCEDURE IN DETAIL:  Informed consent obtained after discussing risks,  benefits, and alternatives. Full history and physical was performed. The patient was classified as ASA class III. Medications were given by  Anesthesia. Cardiopulmonary status was continuously monitored  throughout the procedure. The patient was placed in supine position. Once adequately sedated, a standard upper gastroscope was inserted in  the mouth and advanced under direct visualization to the second portion  of the duodenum. The entire mucosa of esophagus, stomach (retroflexed  and forward views), duodenum (bulb, sweep and second portion) were  examined carefully during withdrawal.  The patient tolerated the  procedure well without any difficulties. FINDINGS:    ESOPHAGUS:  The entire esophagus appeared normal.  There was no evidence  of inflammation, ulcers, strictures or Richardson's. STOMACH:  The entire stomach appeared normal both on forward and  retroflexed views.   A suitable spot for PEG tube placement was  identified using transillumination and palpation method. Once  identified, the skin was cleaned and prepped in sterile manner. A 5 mL  of lidocaine was injected into the subcutaneous tissue for local  anesthesia. A 1-cm incision was made over the skin. A finder trocar  loaded with a needle was inserted in the incision site, observed  entering into the gastric lumen. This was grasped using a snare in the  gastric lumen. The needle was then exchanged in favor of a guidewire  into the trocar. The wire was then grasped inside the gastric lumen and  pulled out of the patient's mouth. The tip of this pulled wire was  attached to the tip of the PEG tube. Traction was applied at the  abdominal wall and the wire was pulled as it pulled the PEG tube back  into the stomach out through the incision site. External bumper was  placed at 2.5-cm kena. Dressing was applied on the skin. DUODENUM:  Examined portion of the duodenum appeared normal.    SUMMARY:  1. Normal EGD. 2.  Successful placement of 20-[employer] pull method PEG  tube EndoVive. 3.  External bolster at 2.5 cm kena. RECOMMENDATIONS:  1. Discharge the patient to home when standard parameters are met. 2.  May use the PEG tube for meds and water flushes in 6 hours. 3.  Start tube feeds tomorrow morning. 4.  Consult dietitian for tube feed formula and rate recommendations. 5.  Keep head of the bed elevated during PEG use. 6.  Flush the PEG tube with 60 mL of free water every 6 hours and after  use. 7.  Follow up as needed.         Eusebia Luu MD    D: 03/01/2023 10:57:16       T: 03/01/2023 11:00:23     [email or web address]  Job#: [id number]     Doc#: [id number]    CC:  Dr. Anastasia Sanchez MD

## 2023-03-31 ENCOUNTER — OFFICE VISIT (OUTPATIENT)
Dept: PULMONOLOGY | Age: 63
End: 2023-03-31
Payer: COMMERCIAL

## 2023-03-31 VITALS
WEIGHT: 91 LBS | BODY MASS INDEX: 16.12 KG/M2 | RESPIRATION RATE: 16 BRPM | HEIGHT: 63 IN | HEART RATE: 88 BPM | DIASTOLIC BLOOD PRESSURE: 60 MMHG | OXYGEN SATURATION: 98 % | SYSTOLIC BLOOD PRESSURE: 112 MMHG

## 2023-03-31 DIAGNOSIS — J44.9 CHRONIC OBSTRUCTIVE PULMONARY DISEASE, UNSPECIFIED COPD TYPE (HCC): Primary | ICD-10-CM

## 2023-03-31 PROCEDURE — 3078F DIAST BP <80 MM HG: CPT | Performed by: INTERNAL MEDICINE

## 2023-03-31 PROCEDURE — 3074F SYST BP LT 130 MM HG: CPT | Performed by: INTERNAL MEDICINE

## 2023-03-31 PROCEDURE — 99204 OFFICE O/P NEW MOD 45 MIN: CPT | Performed by: INTERNAL MEDICINE

## 2023-03-31 RX ORDER — OXYCODONE HYDROCHLORIDE 10 MG/1
10 TABLET ORAL EVERY 8 HOURS PRN
COMMUNITY

## 2023-03-31 RX ORDER — FLUCONAZOLE 100 MG/1
TABLET ORAL
COMMUNITY
Start: 2023-03-16

## 2023-03-31 RX ORDER — OXYCODONE AND ACETAMINOPHEN 10; 325 MG/1; MG/1
TABLET ORAL
COMMUNITY
Start: 2023-03-23

## 2023-03-31 RX ORDER — NITROFURANTOIN 25; 75 MG/1; MG/1
CAPSULE ORAL
COMMUNITY
Start: 2023-03-13

## 2023-03-31 RX ORDER — FLUTICASONE FUROATE, UMECLIDINIUM BROMIDE AND VILANTEROL TRIFENATATE 100; 62.5; 25 UG/1; UG/1; UG/1
1 POWDER RESPIRATORY (INHALATION) DAILY
Qty: 1 EACH | Refills: 3 | Status: SHIPPED | OUTPATIENT
Start: 2023-03-31

## 2023-03-31 NOTE — PROGRESS NOTES
P  [facility]                                               Pulmonary Clinic Consult     I had the pleasure of seeing  Laura Yeboah     Chief Complaint   Patient presents with    New Patient     R/B [place] Emphysema       HISTORY OF PRESENT ILLNESS:    Laura Yeboah is a 57y.o. year old  who smoke for the last 39 years and has about 48 PPY smoking history   She has stage IV colon cancer     The Patient comes in with SOB that has been going on  for the last 12 months and  Associated with mild cough . She  states that it get worse with exercise or walking long distance and he can walk . 100 And go few steps  flight of stairs before get short winded  She is on 1 L  -3 L O2   She had surgery in may for colon cancer   She use it mostly at ambulation           ALLERGIES:    Allergies   Allergen Reactions    Codeine     Other Diarrhea     5FU chemo drug     Penicillins     Sulfa Antibiotics        PAST MEDICAL HISTORY:       Diagnosis Date    Hypertension     Malignant neoplasm of ascending colon (HCC)        MEDICATIONS:   Current Outpatient Medications   Medication Sig Dispense Refill    oxyCODONE-acetaminophen (PERCOCET)  MG per tablet       oxyCODONE HCl (OXY-IR) 10 MG immediate release tablet Take 10 mg by mouth every 8 hours as needed for Pain. fluconazole (DIFLUCAN) 100 MG tablet       furosemide (LASIX) 20 MG tablet Take 0.5 tablets by mouth daily 30 tablet 5    potassium chloride (KLOR-CON) 10 MEQ extended release tablet Take 1 tablet by mouth daily 30 tablet 5    gabapentin (NEURONTIN) 300 MG capsule Take 300 mg by mouth 3 times daily.       pantoprazole (PROTONIX) 40 MG tablet Take 40 mg by mouth daily      ESTRADIOL ACETATE PO Take 1 tablet by mouth at bedtime      nitrofurantoin, macrocrystal-monohydrate, (MACROBID) 100 MG capsule       vitamin D (ERGOCALCIFEROL) 1.25 MG (60179 UT) CAPS capsule once a week       No current facility-administered

## 2023-04-25 ENCOUNTER — TELEPHONE (OUTPATIENT)
Dept: FAMILY MEDICINE CLINIC | Age: 63
End: 2023-04-25

## 2023-04-25 NOTE — TELEPHONE ENCOUNTER
Pt called and asked if you could call in some magic mouthwash for her, she said she thinks she has thrush and her tongue is swollen. She isn't sure if that's from thrush or the cancer tx she is receiving.

## 2023-04-25 NOTE — TELEPHONE ENCOUNTER
Patient informed and prescription sent to [facility] ([facility]).

## 2023-05-25 ENCOUNTER — TELEPHONE (OUTPATIENT)
Dept: FAMILY MEDICINE CLINIC | Age: 63
End: 2023-05-25

## 2023-05-25 NOTE — TELEPHONE ENCOUNTER
[facility] pt went for surgery and they had to cancel due to pt's condition.  She is currently in the ER at [facility]

## 2023-05-25 NOTE — TELEPHONE ENCOUNTER
Consider CBC, CMP, magnesium given the amount of emesis. If need be, could consider some Zofran. I believe patient is being seen by other specialists as well, correct?

## 2023-05-25 NOTE — TELEPHONE ENCOUNTER
Orlin Eugene called and stated that Pt has been vomiting since around 4pm yesterday and that some of her meds from the day before had come up, she is wondering if there may be an obstruction. Pt is going in for a stint replacement with Dr. Devorah Aschoff today @ 3 and Renardace Jabier was wondering if there were any orders you would want to put in for her while she is at the hospital? She also states that the Pt is down to 83lbs as of last week.

## 2023-05-26 DIAGNOSIS — I10 PRIMARY HYPERTENSION: Primary | ICD-10-CM

## 2023-05-26 LAB
CREAT SERPL-MCNC: 0.7 MG/DL
POTASSIUM (K+): 3.8

## 2023-05-28 LAB — POTASSIUM (K+): 3.6

## 2023-05-30 ENCOUNTER — HOSPITAL ENCOUNTER (INPATIENT)
Age: 63
LOS: 7 days | Discharge: HOME HEALTH | DRG: 330 | End: 2023-06-06
Attending: SURGERY | Admitting: SURGERY
Payer: COMMERCIAL

## 2023-05-30 DIAGNOSIS — I10 PRIMARY HYPERTENSION: Primary | ICD-10-CM

## 2023-05-30 DIAGNOSIS — K56.609 SMALL BOWEL OBSTRUCTION (HCC): ICD-10-CM

## 2023-05-30 LAB
ANION GAP SERPL CALCULATED.3IONS-SCNC: 9 MMOL/L (ref 3–16)
BASOPHILS # BLD: 0.1 K/UL (ref 0–0.2)
BASOPHILS NFR BLD: 0.3 %
BUN SERPL-MCNC: 3 MG/DL (ref 7–20)
CALCIUM SERPL-MCNC: 8.3 MG/DL (ref 8.3–10.6)
CHLORIDE SERPL-SCNC: 103 MMOL/L (ref 99–110)
CO2 SERPL-SCNC: 23 MMOL/L (ref 21–32)
CREAT SERPL-MCNC: 0.6 MG/DL (ref 0.6–1.2)
DEPRECATED RDW RBC AUTO: 16.2 % (ref 12.4–15.4)
EOSINOPHIL # BLD: 0 K/UL (ref 0–0.6)
EOSINOPHIL NFR BLD: 0.2 %
GFR SERPLBLD CREATININE-BSD FMLA CKD-EPI: >60 ML/MIN/{1.73_M2}
GLUCOSE BLD-MCNC: 74 MG/DL (ref 70–99)
GLUCOSE BLD-MCNC: 74 MG/DL (ref 70–99)
GLUCOSE SERPL-MCNC: 99 MG/DL (ref 70–99)
HCT VFR BLD AUTO: 40 % (ref 36–48)
HGB BLD-MCNC: 12.8 G/DL (ref 12–16)
LYMPHOCYTES # BLD: 0.9 K/UL (ref 1–5.1)
LYMPHOCYTES NFR BLD: 4.4 %
MAGNESIUM SERPL-MCNC: 1.7 MG/DL (ref 1.8–2.4)
MCH RBC QN AUTO: 30.7 PG (ref 26–34)
MCHC RBC AUTO-ENTMCNC: 31.9 G/DL (ref 31–36)
MCV RBC AUTO: 96.3 FL (ref 80–100)
MONOCYTES # BLD: 0.8 K/UL (ref 0–1.3)
MONOCYTES NFR BLD: 4 %
NEUTROPHILS # BLD: 18.1 K/UL (ref 1.7–7.7)
NEUTROPHILS NFR BLD: 91.1 %
PERFORMED ON: NORMAL
PERFORMED ON: NORMAL
PHOSPHATE SERPL-MCNC: 2.1 MG/DL (ref 2.5–4.9)
PLATELET # BLD AUTO: 156 K/UL (ref 135–450)
PMV BLD AUTO: 7.1 FL (ref 5–10.5)
POTASSIUM SERPL-SCNC: 3.3 MMOL/L (ref 3.5–5.1)
RBC # BLD AUTO: 4.15 M/UL (ref 4–5.2)
SODIUM SERPL-SCNC: 135 MMOL/L (ref 136–145)
WBC # BLD AUTO: 19.9 K/UL (ref 4–11)

## 2023-05-30 PROCEDURE — 36415 COLL VENOUS BLD VENIPUNCTURE: CPT

## 2023-05-30 PROCEDURE — 6370000000 HC RX 637 (ALT 250 FOR IP)

## 2023-05-30 PROCEDURE — 94761 N-INVAS EAR/PLS OXIMETRY MLT: CPT

## 2023-05-30 PROCEDURE — 85025 COMPLETE CBC W/AUTO DIFF WBC: CPT

## 2023-05-30 PROCEDURE — 6360000002 HC RX W HCPCS: Performed by: SURGERY

## 2023-05-30 PROCEDURE — 6360000002 HC RX W HCPCS

## 2023-05-30 PROCEDURE — APPSS30 APP SPLIT SHARED TIME 16-30 MINUTES

## 2023-05-30 PROCEDURE — 94150 VITAL CAPACITY TEST: CPT

## 2023-05-30 PROCEDURE — 84100 ASSAY OF PHOSPHORUS: CPT

## 2023-05-30 PROCEDURE — 80048 BASIC METABOLIC PNL TOTAL CA: CPT

## 2023-05-30 PROCEDURE — 1200000000 HC SEMI PRIVATE

## 2023-05-30 PROCEDURE — 99223 1ST HOSP IP/OBS HIGH 75: CPT | Performed by: SURGERY

## 2023-05-30 PROCEDURE — 94640 AIRWAY INHALATION TREATMENT: CPT

## 2023-05-30 PROCEDURE — A4216 STERILE WATER/SALINE, 10 ML: HCPCS | Performed by: SURGERY

## 2023-05-30 PROCEDURE — 2580000003 HC RX 258: Performed by: SURGERY

## 2023-05-30 PROCEDURE — 2500000003 HC RX 250 WO HCPCS: Performed by: SURGERY

## 2023-05-30 PROCEDURE — 83735 ASSAY OF MAGNESIUM: CPT

## 2023-05-30 RX ORDER — ENOXAPARIN SODIUM 100 MG/ML
30 INJECTION SUBCUTANEOUS EVERY 24 HOURS
Status: DISCONTINUED | OUTPATIENT
Start: 2023-05-30 | End: 2023-06-06 | Stop reason: HOSPADM

## 2023-05-30 RX ORDER — SODIUM CHLORIDE 0.9 % (FLUSH) 0.9 %
5-40 SYRINGE (ML) INJECTION EVERY 12 HOURS SCHEDULED
Status: DISCONTINUED | OUTPATIENT
Start: 2023-05-30 | End: 2023-06-06 | Stop reason: HOSPADM

## 2023-05-30 RX ORDER — BUDESONIDE AND FORMOTEROL FUMARATE DIHYDRATE 160; 4.5 UG/1; UG/1
2 AEROSOL RESPIRATORY (INHALATION) 2 TIMES DAILY
Status: DISCONTINUED | OUTPATIENT
Start: 2023-05-30 | End: 2023-06-06 | Stop reason: HOSPADM

## 2023-05-30 RX ORDER — ONDANSETRON 2 MG/ML
4 INJECTION INTRAMUSCULAR; INTRAVENOUS EVERY 6 HOURS PRN
Status: DISCONTINUED | OUTPATIENT
Start: 2023-05-30 | End: 2023-06-06 | Stop reason: HOSPADM

## 2023-05-30 RX ORDER — SODIUM CHLORIDE 0.9 % (FLUSH) 0.9 %
5-40 SYRINGE (ML) INJECTION PRN
Status: DISCONTINUED | OUTPATIENT
Start: 2023-05-30 | End: 2023-06-06 | Stop reason: HOSPADM

## 2023-05-30 RX ORDER — SODIUM CHLORIDE 9 MG/ML
INJECTION, SOLUTION INTRAVENOUS PRN
Status: DISCONTINUED | OUTPATIENT
Start: 2023-05-30 | End: 2023-06-06 | Stop reason: HOSPADM

## 2023-05-30 RX ORDER — MORPHINE SULFATE 30 MG/1
30 CAPSULE, EXTENDED RELEASE ORAL 2 TIMES DAILY
COMMUNITY

## 2023-05-30 RX ORDER — MORPHINE SULFATE 2 MG/ML
2 INJECTION, SOLUTION INTRAMUSCULAR; INTRAVENOUS
Status: DISCONTINUED | OUTPATIENT
Start: 2023-05-30 | End: 2023-06-05

## 2023-05-30 RX ORDER — ACETAMINOPHEN 650 MG/1
650 SUPPOSITORY RECTAL EVERY 6 HOURS PRN
Status: DISCONTINUED | OUTPATIENT
Start: 2023-05-30 | End: 2023-06-06 | Stop reason: HOSPADM

## 2023-05-30 RX ORDER — MORPHINE SULFATE 4 MG/ML
4 INJECTION, SOLUTION INTRAMUSCULAR; INTRAVENOUS
Status: DISCONTINUED | OUTPATIENT
Start: 2023-05-30 | End: 2023-06-05

## 2023-05-30 RX ORDER — SODIUM CHLORIDE 9 MG/ML
INJECTION, SOLUTION INTRAVENOUS CONTINUOUS
Status: DISCONTINUED | OUTPATIENT
Start: 2023-05-30 | End: 2023-05-31

## 2023-05-30 RX ADMIN — MORPHINE SULFATE 4 MG: 4 INJECTION, SOLUTION INTRAMUSCULAR; INTRAVENOUS at 19:02

## 2023-05-30 RX ADMIN — MORPHINE SULFATE 4 MG: 4 INJECTION, SOLUTION INTRAMUSCULAR; INTRAVENOUS at 21:20

## 2023-05-30 RX ADMIN — Medication 2 PUFF: at 18:38

## 2023-05-30 RX ADMIN — ENOXAPARIN SODIUM 30 MG: 100 INJECTION SUBCUTANEOUS at 16:29

## 2023-05-30 RX ADMIN — SODIUM CHLORIDE: 9 INJECTION, SOLUTION INTRAVENOUS at 16:36

## 2023-05-30 RX ADMIN — Medication 20 MG: at 21:19

## 2023-05-30 RX ADMIN — MORPHINE SULFATE 4 MG: 4 INJECTION, SOLUTION INTRAMUSCULAR; INTRAVENOUS at 15:38

## 2023-05-30 RX ADMIN — ONDANSETRON 4 MG: 2 INJECTION INTRAMUSCULAR; INTRAVENOUS at 19:13

## 2023-05-30 RX ADMIN — MORPHINE SULFATE 4 MG: 4 INJECTION, SOLUTION INTRAMUSCULAR; INTRAVENOUS at 23:23

## 2023-05-30 ASSESSMENT — PAIN SCALES - GENERAL
PAINLEVEL_OUTOF10: 9
PAINLEVEL_OUTOF10: 7
PAINLEVEL_OUTOF10: 4
PAINLEVEL_OUTOF10: 9
PAINLEVEL_OUTOF10: 9
PAINLEVEL_OUTOF10: 8
PAINLEVEL_OUTOF10: 4
PAINLEVEL_OUTOF10: 6
PAINLEVEL_OUTOF10: 5
PAINLEVEL_OUTOF10: 7

## 2023-05-30 ASSESSMENT — PAIN DESCRIPTION - ORIENTATION
ORIENTATION: MID;RIGHT;LEFT
ORIENTATION: MID
ORIENTATION: RIGHT;LEFT;MID
ORIENTATION: RIGHT;LEFT;LOWER;MID
ORIENTATION: MID;RIGHT;LEFT

## 2023-05-30 ASSESSMENT — PAIN DESCRIPTION - ONSET
ONSET: ON-GOING

## 2023-05-30 ASSESSMENT — PAIN DESCRIPTION - FREQUENCY
FREQUENCY: CONTINUOUS
FREQUENCY: INTERMITTENT
FREQUENCY: INTERMITTENT
FREQUENCY: CONTINUOUS
FREQUENCY: CONTINUOUS

## 2023-05-30 ASSESSMENT — PAIN DESCRIPTION - DESCRIPTORS
DESCRIPTORS: DULL;SHARP
DESCRIPTORS: STABBING;DULL
DESCRIPTORS: ACHING;DULL;SHARP
DESCRIPTORS: ACHING;SHARP
DESCRIPTORS: STABBING;ACHING

## 2023-05-30 ASSESSMENT — PAIN DESCRIPTION - LOCATION
LOCATION: ABDOMEN

## 2023-05-30 ASSESSMENT — PAIN DESCRIPTION - PAIN TYPE
TYPE: ACUTE PAIN

## 2023-05-31 ENCOUNTER — ANESTHESIA (OUTPATIENT)
Dept: OPERATING ROOM | Age: 63
End: 2023-05-31
Payer: COMMERCIAL

## 2023-05-31 ENCOUNTER — APPOINTMENT (OUTPATIENT)
Dept: GENERAL RADIOLOGY | Age: 63
DRG: 330 | End: 2023-05-31
Attending: SURGERY
Payer: COMMERCIAL

## 2023-05-31 ENCOUNTER — ANESTHESIA EVENT (OUTPATIENT)
Dept: OPERATING ROOM | Age: 63
End: 2023-05-31
Payer: COMMERCIAL

## 2023-05-31 LAB
ANION GAP SERPL CALCULATED.3IONS-SCNC: 10 MMOL/L (ref 3–16)
BASOPHILS # BLD: 0.1 K/UL (ref 0–0.2)
BASOPHILS NFR BLD: 0.6 %
BUN SERPL-MCNC: 5 MG/DL (ref 7–20)
CALCIUM SERPL-MCNC: 8.5 MG/DL (ref 8.3–10.6)
CHLORIDE SERPL-SCNC: 106 MMOL/L (ref 99–110)
CO2 SERPL-SCNC: 22 MMOL/L (ref 21–32)
CREAT SERPL-MCNC: <0.5 MG/DL (ref 0.6–1.2)
DEPRECATED RDW RBC AUTO: 16.1 % (ref 12.4–15.4)
EOSINOPHIL # BLD: 0.3 K/UL (ref 0–0.6)
EOSINOPHIL NFR BLD: 1.9 %
GFR SERPLBLD CREATININE-BSD FMLA CKD-EPI: >60 ML/MIN/{1.73_M2}
GLUCOSE BLD-MCNC: 107 MG/DL (ref 70–99)
GLUCOSE BLD-MCNC: 70 MG/DL (ref 70–99)
GLUCOSE BLD-MCNC: 71 MG/DL (ref 70–99)
GLUCOSE BLD-MCNC: 99 MG/DL (ref 70–99)
GLUCOSE SERPL-MCNC: 83 MG/DL (ref 70–99)
HCT VFR BLD AUTO: 37.9 % (ref 36–48)
HGB BLD-MCNC: 12.4 G/DL (ref 12–16)
LYMPHOCYTES # BLD: 0.9 K/UL (ref 1–5.1)
LYMPHOCYTES NFR BLD: 6.1 %
MAGNESIUM SERPL-MCNC: 1.8 MG/DL (ref 1.8–2.4)
MCH RBC QN AUTO: 31.7 PG (ref 26–34)
MCHC RBC AUTO-ENTMCNC: 32.8 G/DL (ref 31–36)
MCV RBC AUTO: 96.7 FL (ref 80–100)
MONOCYTES # BLD: 0.7 K/UL (ref 0–1.3)
MONOCYTES NFR BLD: 4.8 %
NEUTROPHILS # BLD: 12.3 K/UL (ref 1.7–7.7)
NEUTROPHILS NFR BLD: 86.6 %
PERFORMED ON: ABNORMAL
PERFORMED ON: NORMAL
PHOSPHATE SERPL-MCNC: 2.5 MG/DL (ref 2.5–4.9)
PLATELET # BLD AUTO: 152 K/UL (ref 135–450)
PLATELET BLD QL SMEAR: ADEQUATE
PMV BLD AUTO: 7.7 FL (ref 5–10.5)
POTASSIUM SERPL-SCNC: 3.1 MMOL/L (ref 3.5–5.1)
RBC # BLD AUTO: 3.92 M/UL (ref 4–5.2)
SLIDE REVIEW: ABNORMAL
SODIUM SERPL-SCNC: 138 MMOL/L (ref 136–145)
WBC # BLD AUTO: 14.3 K/UL (ref 4–11)

## 2023-05-31 PROCEDURE — 2580000003 HC RX 258

## 2023-05-31 PROCEDURE — 76000 FLUOROSCOPY <1 HR PHYS/QHP: CPT

## 2023-05-31 PROCEDURE — 36415 COLL VENOUS BLD VENIPUNCTURE: CPT

## 2023-05-31 PROCEDURE — 2500000003 HC RX 250 WO HCPCS: Performed by: NURSE ANESTHETIST, CERTIFIED REGISTERED

## 2023-05-31 PROCEDURE — 0TP98DZ REMOVAL OF INTRALUMINAL DEVICE FROM URETER, VIA NATURAL OR ARTIFICIAL OPENING ENDOSCOPIC: ICD-10-PCS | Performed by: UROLOGY

## 2023-05-31 PROCEDURE — 3600000004 HC SURGERY LEVEL 4 BASE: Performed by: SURGERY

## 2023-05-31 PROCEDURE — 3600000014 HC SURGERY LEVEL 4 ADDTL 15MIN: Performed by: SURGERY

## 2023-05-31 PROCEDURE — 94640 AIRWAY INHALATION TREATMENT: CPT

## 2023-05-31 PROCEDURE — 6370000000 HC RX 637 (ALT 250 FOR IP)

## 2023-05-31 PROCEDURE — C2617 STENT, NON-COR, TEM W/O DEL: HCPCS | Performed by: SURGERY

## 2023-05-31 PROCEDURE — 2580000003 HC RX 258: Performed by: NURSE ANESTHETIST, CERTIFIED REGISTERED

## 2023-05-31 PROCEDURE — C1769 GUIDE WIRE: HCPCS | Performed by: SURGERY

## 2023-05-31 PROCEDURE — 2700000000 HC OXYGEN THERAPY PER DAY

## 2023-05-31 PROCEDURE — 2500000003 HC RX 250 WO HCPCS: Performed by: SURGERY

## 2023-05-31 PROCEDURE — 0D180Z8 BYPASS SMALL INTESTINE TO SMALL INTESTINE, OPEN APPROACH: ICD-10-PCS | Performed by: SURGERY

## 2023-05-31 PROCEDURE — 2709999900 HC NON-CHARGEABLE SUPPLY: Performed by: SURGERY

## 2023-05-31 PROCEDURE — 2580000003 HC RX 258: Performed by: SURGERY

## 2023-05-31 PROCEDURE — C2626 INFUSION PUMP, NON-PROG,TEMP: HCPCS | Performed by: SURGERY

## 2023-05-31 PROCEDURE — 80048 BASIC METABOLIC PNL TOTAL CA: CPT

## 2023-05-31 PROCEDURE — 1200000000 HC SEMI PRIVATE

## 2023-05-31 PROCEDURE — C1892 INTRO/SHEATH,FIXED,PEEL-AWAY: HCPCS | Performed by: SURGERY

## 2023-05-31 PROCEDURE — 6360000002 HC RX W HCPCS: Performed by: NURSE ANESTHETIST, CERTIFIED REGISTERED

## 2023-05-31 PROCEDURE — A4216 STERILE WATER/SALINE, 10 ML: HCPCS | Performed by: SURGERY

## 2023-05-31 PROCEDURE — 2720000010 HC SURG SUPPLY STERILE: Performed by: SURGERY

## 2023-05-31 PROCEDURE — 0T768DZ DILATION OF RIGHT URETER WITH INTRALUMINAL DEVICE, VIA NATURAL OR ARTIFICIAL OPENING ENDOSCOPIC: ICD-10-PCS | Performed by: UROLOGY

## 2023-05-31 PROCEDURE — 7100000001 HC PACU RECOVERY - ADDTL 15 MIN: Performed by: SURGERY

## 2023-05-31 PROCEDURE — 6360000002 HC RX W HCPCS: Performed by: SURGERY

## 2023-05-31 PROCEDURE — 6360000002 HC RX W HCPCS: Performed by: ANESTHESIOLOGY

## 2023-05-31 PROCEDURE — 85025 COMPLETE CBC W/AUTO DIFF WBC: CPT

## 2023-05-31 PROCEDURE — 3700000000 HC ANESTHESIA ATTENDED CARE: Performed by: SURGERY

## 2023-05-31 PROCEDURE — 6360000002 HC RX W HCPCS

## 2023-05-31 PROCEDURE — 94761 N-INVAS EAR/PLS OXIMETRY MLT: CPT

## 2023-05-31 PROCEDURE — 6370000000 HC RX 637 (ALT 250 FOR IP): Performed by: SURGERY

## 2023-05-31 PROCEDURE — 83735 ASSAY OF MAGNESIUM: CPT

## 2023-05-31 PROCEDURE — 3700000001 HC ADD 15 MINUTES (ANESTHESIA): Performed by: SURGERY

## 2023-05-31 PROCEDURE — 7100000000 HC PACU RECOVERY - FIRST 15 MIN: Performed by: SURGERY

## 2023-05-31 PROCEDURE — 84100 ASSAY OF PHOSPHORUS: CPT

## 2023-05-31 DEVICE — URETERAL STENT
Type: IMPLANTABLE DEVICE | Site: URETER | Status: FUNCTIONAL
Brand: CONTOUR™

## 2023-05-31 RX ORDER — MORPHINE SULFATE 2 MG/ML
2 INJECTION, SOLUTION INTRAMUSCULAR; INTRAVENOUS
Status: DISCONTINUED | OUTPATIENT
Start: 2023-05-31 | End: 2023-05-31

## 2023-05-31 RX ORDER — MEPERIDINE HYDROCHLORIDE 25 MG/ML
12.5 INJECTION INTRAMUSCULAR; INTRAVENOUS; SUBCUTANEOUS EVERY 5 MIN PRN
Status: DISCONTINUED | OUTPATIENT
Start: 2023-05-31 | End: 2023-05-31 | Stop reason: HOSPADM

## 2023-05-31 RX ORDER — POLYETHYLENE GLYCOL 3350 17 G/17G
17 POWDER, FOR SOLUTION ORAL DAILY PRN
COMMUNITY

## 2023-05-31 RX ORDER — ONDANSETRON 2 MG/ML
INJECTION INTRAMUSCULAR; INTRAVENOUS PRN
Status: DISCONTINUED | OUTPATIENT
Start: 2023-05-31 | End: 2023-05-31 | Stop reason: SDUPTHER

## 2023-05-31 RX ORDER — ROCURONIUM BROMIDE 10 MG/ML
INJECTION, SOLUTION INTRAVENOUS PRN
Status: DISCONTINUED | OUTPATIENT
Start: 2023-05-31 | End: 2023-05-31 | Stop reason: SDUPTHER

## 2023-05-31 RX ORDER — DEXAMETHASONE SODIUM PHOSPHATE 4 MG/ML
INJECTION, SOLUTION INTRA-ARTICULAR; INTRALESIONAL; INTRAMUSCULAR; INTRAVENOUS; SOFT TISSUE PRN
Status: DISCONTINUED | OUTPATIENT
Start: 2023-05-31 | End: 2023-05-31 | Stop reason: SDUPTHER

## 2023-05-31 RX ORDER — SODIUM CHLORIDE 0.9 % (FLUSH) 0.9 %
5-40 SYRINGE (ML) INJECTION PRN
Status: DISCONTINUED | OUTPATIENT
Start: 2023-05-31 | End: 2023-05-31 | Stop reason: SDUPTHER

## 2023-05-31 RX ORDER — ONDANSETRON 2 MG/ML
4 INJECTION INTRAMUSCULAR; INTRAVENOUS
Status: COMPLETED | OUTPATIENT
Start: 2023-05-31 | End: 2023-05-31

## 2023-05-31 RX ORDER — 0.9 % SODIUM CHLORIDE 0.9 %
500 INTRAVENOUS SOLUTION INTRAVENOUS ONCE
Status: COMPLETED | OUTPATIENT
Start: 2023-05-31 | End: 2023-05-31

## 2023-05-31 RX ORDER — LEVOFLOXACIN 5 MG/ML
500 INJECTION, SOLUTION INTRAVENOUS ONCE
Status: COMPLETED | OUTPATIENT
Start: 2023-05-31 | End: 2023-05-31

## 2023-05-31 RX ORDER — SODIUM CHLORIDE 9 MG/ML
25 INJECTION, SOLUTION INTRAVENOUS PRN
Status: DISCONTINUED | OUTPATIENT
Start: 2023-05-31 | End: 2023-05-31 | Stop reason: SDUPTHER

## 2023-05-31 RX ORDER — FENTANYL 12 UG/H
1 PATCH TRANSDERMAL
Status: DISCONTINUED | OUTPATIENT
Start: 2023-05-31 | End: 2023-06-05

## 2023-05-31 RX ORDER — POTASSIUM CHLORIDE 29.8 MG/ML
20 INJECTION INTRAVENOUS
Status: COMPLETED | OUTPATIENT
Start: 2023-05-31 | End: 2023-05-31

## 2023-05-31 RX ORDER — OXYCODONE HYDROCHLORIDE 5 MG/1
5 TABLET ORAL PRN
Status: DISCONTINUED | OUTPATIENT
Start: 2023-05-31 | End: 2023-05-31 | Stop reason: HOSPADM

## 2023-05-31 RX ORDER — FENTANYL CITRATE 50 UG/ML
INJECTION, SOLUTION INTRAMUSCULAR; INTRAVENOUS PRN
Status: DISCONTINUED | OUTPATIENT
Start: 2023-05-31 | End: 2023-05-31 | Stop reason: SDUPTHER

## 2023-05-31 RX ORDER — PROCHLORPERAZINE EDISYLATE 5 MG/ML
10 INJECTION INTRAMUSCULAR; INTRAVENOUS EVERY 6 HOURS PRN
Status: DISCONTINUED | OUTPATIENT
Start: 2023-05-31 | End: 2023-06-06 | Stop reason: HOSPADM

## 2023-05-31 RX ORDER — SODIUM CHLORIDE 0.9 % (FLUSH) 0.9 %
5-40 SYRINGE (ML) INJECTION EVERY 12 HOURS SCHEDULED
Status: DISCONTINUED | OUTPATIENT
Start: 2023-05-31 | End: 2023-05-31 | Stop reason: SDUPTHER

## 2023-05-31 RX ORDER — DIPHENHYDRAMINE HYDROCHLORIDE 50 MG/ML
12.5 INJECTION INTRAMUSCULAR; INTRAVENOUS
Status: DISCONTINUED | OUTPATIENT
Start: 2023-05-31 | End: 2023-05-31 | Stop reason: HOSPADM

## 2023-05-31 RX ORDER — OXYCODONE HYDROCHLORIDE 5 MG/1
10 TABLET ORAL PRN
Status: DISCONTINUED | OUTPATIENT
Start: 2023-05-31 | End: 2023-05-31 | Stop reason: HOSPADM

## 2023-05-31 RX ORDER — PROPOFOL 10 MG/ML
INJECTION, EMULSION INTRAVENOUS PRN
Status: DISCONTINUED | OUTPATIENT
Start: 2023-05-31 | End: 2023-05-31 | Stop reason: SDUPTHER

## 2023-05-31 RX ORDER — LIDOCAINE HYDROCHLORIDE 20 MG/ML
INJECTION, SOLUTION INFILTRATION; PERINEURAL PRN
Status: DISCONTINUED | OUTPATIENT
Start: 2023-05-31 | End: 2023-05-31 | Stop reason: SDUPTHER

## 2023-05-31 RX ORDER — DEXTROSE AND SODIUM CHLORIDE 5; .45 G/100ML; G/100ML
INJECTION, SOLUTION INTRAVENOUS CONTINUOUS
Status: DISCONTINUED | OUTPATIENT
Start: 2023-05-31 | End: 2023-06-02

## 2023-05-31 RX ORDER — DEXTROSE MONOHYDRATE 100 MG/ML
INJECTION, SOLUTION INTRAVENOUS CONTINUOUS PRN
Status: DISCONTINUED | OUTPATIENT
Start: 2023-05-31 | End: 2023-06-06 | Stop reason: HOSPADM

## 2023-05-31 RX ORDER — LABETALOL HYDROCHLORIDE 5 MG/ML
10 INJECTION, SOLUTION INTRAVENOUS
Status: DISCONTINUED | OUTPATIENT
Start: 2023-05-31 | End: 2023-05-31 | Stop reason: HOSPADM

## 2023-05-31 RX ORDER — SODIUM CHLORIDE, SODIUM LACTATE, POTASSIUM CHLORIDE, CALCIUM CHLORIDE 600; 310; 30; 20 MG/100ML; MG/100ML; MG/100ML; MG/100ML
INJECTION, SOLUTION INTRAVENOUS CONTINUOUS PRN
Status: DISCONTINUED | OUTPATIENT
Start: 2023-05-31 | End: 2023-05-31 | Stop reason: SDUPTHER

## 2023-05-31 RX ADMIN — SODIUM CHLORIDE 500 ML: 9 INJECTION, SOLUTION INTRAVENOUS at 09:16

## 2023-05-31 RX ADMIN — DEXAMETHASONE SODIUM PHOSPHATE 4 MG: 4 INJECTION, SOLUTION INTRAMUSCULAR; INTRAVENOUS at 11:18

## 2023-05-31 RX ADMIN — Medication 0.5 MG: at 13:10

## 2023-05-31 RX ADMIN — POTASSIUM CHLORIDE 20 MEQ: 29.8 INJECTION, SOLUTION INTRAVENOUS at 08:13

## 2023-05-31 RX ADMIN — SODIUM CHLORIDE, POTASSIUM CHLORIDE, SODIUM LACTATE AND CALCIUM CHLORIDE: 600; 310; 30; 20 INJECTION, SOLUTION INTRAVENOUS at 11:00

## 2023-05-31 RX ADMIN — SODIUM CHLORIDE: 9 INJECTION, SOLUTION INTRAVENOUS at 04:17

## 2023-05-31 RX ADMIN — POTASSIUM CHLORIDE 20 MEQ: 29.8 INJECTION, SOLUTION INTRAVENOUS at 09:18

## 2023-05-31 RX ADMIN — ONDANSETRON HYDROCHLORIDE 4 MG: 2 INJECTION, SOLUTION INTRAMUSCULAR; INTRAVENOUS at 11:18

## 2023-05-31 RX ADMIN — SUGAMMADEX 100 MG: 100 INJECTION, SOLUTION INTRAVENOUS at 12:41

## 2023-05-31 RX ADMIN — TIOTROPIUM BROMIDE INHALATION SPRAY 2 PUFF: 3.12 SPRAY, METERED RESPIRATORY (INHALATION) at 07:01

## 2023-05-31 RX ADMIN — DEXTROSE AND SODIUM CHLORIDE: 5; 450 INJECTION, SOLUTION INTRAVENOUS at 10:21

## 2023-05-31 RX ADMIN — DEXTROSE AND SODIUM CHLORIDE: 5; 450 INJECTION, SOLUTION INTRAVENOUS at 23:39

## 2023-05-31 RX ADMIN — MORPHINE SULFATE 4 MG: 4 INJECTION, SOLUTION INTRAMUSCULAR; INTRAVENOUS at 04:13

## 2023-05-31 RX ADMIN — FENTANYL CITRATE 25 MCG: 50 INJECTION INTRAMUSCULAR; INTRAVENOUS at 11:59

## 2023-05-31 RX ADMIN — ROCURONIUM BROMIDE 40 MG: 10 INJECTION, SOLUTION INTRAVENOUS at 11:07

## 2023-05-31 RX ADMIN — LIDOCAINE HYDROCHLORIDE 40 MG: 20 INJECTION, SOLUTION INFILTRATION; PERINEURAL at 11:07

## 2023-05-31 RX ADMIN — MORPHINE SULFATE 2 MG: 2 INJECTION, SOLUTION INTRAMUSCULAR; INTRAVENOUS at 09:11

## 2023-05-31 RX ADMIN — FENTANYL CITRATE 25 MCG: 50 INJECTION INTRAMUSCULAR; INTRAVENOUS at 11:35

## 2023-05-31 RX ADMIN — Medication 0.5 MG: at 13:24

## 2023-05-31 RX ADMIN — MORPHINE SULFATE 4 MG: 4 INJECTION, SOLUTION INTRAMUSCULAR; INTRAVENOUS at 18:00

## 2023-05-31 RX ADMIN — Medication 2 PUFF: at 07:01

## 2023-05-31 RX ADMIN — MORPHINE SULFATE 4 MG: 4 INJECTION, SOLUTION INTRAMUSCULAR; INTRAVENOUS at 20:07

## 2023-05-31 RX ADMIN — Medication 20 MG: at 20:07

## 2023-05-31 RX ADMIN — MORPHINE SULFATE 4 MG: 4 INJECTION, SOLUTION INTRAMUSCULAR; INTRAVENOUS at 21:55

## 2023-05-31 RX ADMIN — Medication 2 PUFF: at 18:52

## 2023-05-31 RX ADMIN — LEVOFLOXACIN 500 MG: 5 INJECTION, SOLUTION INTRAVENOUS at 08:08

## 2023-05-31 RX ADMIN — ONDANSETRON 4 MG: 2 INJECTION INTRAMUSCULAR; INTRAVENOUS at 13:04

## 2023-05-31 RX ADMIN — ROCURONIUM BROMIDE 10 MG: 10 INJECTION, SOLUTION INTRAVENOUS at 11:59

## 2023-05-31 RX ADMIN — MORPHINE SULFATE 4 MG: 4 INJECTION, SOLUTION INTRAMUSCULAR; INTRAVENOUS at 06:24

## 2023-05-31 RX ADMIN — FENTANYL CITRATE 25 MCG: 50 INJECTION INTRAMUSCULAR; INTRAVENOUS at 12:29

## 2023-05-31 RX ADMIN — PROPOFOL 100 MG: 10 INJECTION, EMULSION INTRAVENOUS at 11:07

## 2023-05-31 RX ADMIN — Medication 0.5 MG: at 13:31

## 2023-05-31 RX ADMIN — FENTANYL CITRATE 25 MCG: 50 INJECTION INTRAMUSCULAR; INTRAVENOUS at 11:30

## 2023-05-31 RX ADMIN — Medication 20 MG: at 09:11

## 2023-05-31 RX ADMIN — MORPHINE SULFATE 4 MG: 4 INJECTION, SOLUTION INTRAMUSCULAR; INTRAVENOUS at 16:00

## 2023-05-31 RX ADMIN — MORPHINE SULFATE 4 MG: 4 INJECTION, SOLUTION INTRAMUSCULAR; INTRAVENOUS at 02:02

## 2023-05-31 RX ADMIN — MORPHINE SULFATE 2 MG: 2 INJECTION, SOLUTION INTRAMUSCULAR; INTRAVENOUS at 13:47

## 2023-05-31 ASSESSMENT — PAIN DESCRIPTION - ORIENTATION
ORIENTATION: RIGHT;LEFT;MID
ORIENTATION: RIGHT;LEFT
ORIENTATION: RIGHT;LEFT
ORIENTATION: RIGHT;LEFT;MID;LOWER
ORIENTATION: RIGHT;LEFT;MID
ORIENTATION: MID
ORIENTATION: RIGHT;LEFT
ORIENTATION: RIGHT;LEFT;MID;LOWER
ORIENTATION: MID
ORIENTATION: MID

## 2023-05-31 ASSESSMENT — PAIN DESCRIPTION - LOCATION
LOCATION: ABDOMEN

## 2023-05-31 ASSESSMENT — PAIN DESCRIPTION - DESCRIPTORS
DESCRIPTORS: SHARP;SORE
DESCRIPTORS: STABBING
DESCRIPTORS: SHARP
DESCRIPTORS: SORE
DESCRIPTORS: STABBING
DESCRIPTORS: SHARP
DESCRIPTORS: STABBING;THROBBING
DESCRIPTORS: STABBING
DESCRIPTORS: STABBING
DESCRIPTORS: SHARP;DULL;PRESSURE

## 2023-05-31 ASSESSMENT — PAIN SCALES - GENERAL
PAINLEVEL_OUTOF10: 7
PAINLEVEL_OUTOF10: 9
PAINLEVEL_OUTOF10: 0
PAINLEVEL_OUTOF10: 10
PAINLEVEL_OUTOF10: 7
PAINLEVEL_OUTOF10: 7
PAINLEVEL_OUTOF10: 0
PAINLEVEL_OUTOF10: 6
PAINLEVEL_OUTOF10: 8
PAINLEVEL_OUTOF10: 10
PAINLEVEL_OUTOF10: 5
PAINLEVEL_OUTOF10: 10
PAINLEVEL_OUTOF10: 4
PAINLEVEL_OUTOF10: 9
PAINLEVEL_OUTOF10: 10
PAINLEVEL_OUTOF10: 6
PAINLEVEL_OUTOF10: 6
PAINLEVEL_OUTOF10: 4
PAINLEVEL_OUTOF10: 10
PAINLEVEL_OUTOF10: 0
PAINLEVEL_OUTOF10: 3
PAINLEVEL_OUTOF10: 10
PAINLEVEL_OUTOF10: 7
PAINLEVEL_OUTOF10: 6
PAINLEVEL_OUTOF10: 10
PAINLEVEL_OUTOF10: 7
PAINLEVEL_OUTOF10: 6
PAINLEVEL_OUTOF10: 5

## 2023-05-31 ASSESSMENT — PAIN - FUNCTIONAL ASSESSMENT

## 2023-05-31 ASSESSMENT — PAIN DESCRIPTION - ONSET
ONSET: ON-GOING

## 2023-05-31 ASSESSMENT — PAIN DESCRIPTION - PAIN TYPE
TYPE: SURGICAL PAIN
TYPE: ACUTE PAIN
TYPE: SURGICAL PAIN
TYPE: ACUTE PAIN
TYPE: SURGICAL PAIN
TYPE: ACUTE PAIN
TYPE: SURGICAL PAIN
TYPE: SURGICAL PAIN

## 2023-05-31 ASSESSMENT — PAIN DESCRIPTION - FREQUENCY
FREQUENCY: CONTINUOUS

## 2023-05-31 ASSESSMENT — LIFESTYLE VARIABLES: SMOKING_STATUS: 1

## 2023-05-31 NOTE — ANESTHESIA PRE PROCEDURE
Department of Anesthesiology  Preprocedure Note       Name:  Alia Vanegas   Age:  57 y.o.  :  1960                                          MRN:  [id number]         Date:  2023      Surgeon: Jarrell Roberts):  Pedro Cordero MD    Procedure: Procedure(s):  LAPAROTOMY, POSSIBLE BOWEL RESECTION, POSSIBLE OSTOMY    Medications prior to admission:   Prior to Admission medications    Medication Sig Start Date End Date Taking? Authorizing Provider   morphine (KATE) 30 MG extended release capsule Take 1 capsule by mouth 2 times daily. Max Daily Amount: 60 mg   Yes Historical Provider, MD   oxyCODONE-acetaminophen (PERCOCET)  MG per tablet  3/23/23   Historical Provider, MD   oxyCODONE HCl (OXY-IR) 10 MG immediate release tablet Take 1 tablet by mouth every 8 hours as needed for Pain. Historical Provider, MD   fluconazole (DIFLUCAN) 100 MG tablet  3/16/23   Historical Provider, MD   nitrofurantoin, macrocrystal-monohydrate, (MACROBID) 100 MG capsule  3/13/23   Historical Provider, MD   fluticasone-umeclidin-vilant (TRELEGY ELLIPTA) 442-37.1-36 MCG/ACT AEPB inhaler Inhale 1 puff into the lungs daily 3/31/23   Michelle Jacob MD   furosemide (LASIX) 20 MG tablet Take 0.5 tablets by mouth daily 23   GUY Wynn CNP   potassium chloride (KLOR-CON) 10 MEQ extended release tablet Take 1 tablet by mouth daily 23   GUY Wynn CNP   gabapentin (NEURONTIN) 300 MG capsule Take 1 capsule by mouth 3 times daily.     Historical Provider, MD   vitamin D (ERGOCALCIFEROL) 1.25 MG (27375 UT) CAPS capsule once a week Once a week on 22   Historical Provider, MD   pantoprazole (PROTONIX) 40 MG tablet Take 1 tablet by mouth daily 22   Historical Provider, MD   ESTRADIOL ACETATE PO Take 1 tablet by mouth at bedtime Pt unsure of does pt pharm not sure    Historical Provider, MD       Current medications:    Current Facility-Administered Medications   Medication Dose

## 2023-05-31 NOTE — ANESTHESIA POSTPROCEDURE EVALUATION
Department of Anesthesiology  Postprocedure Note    Patient: Chris Bill  MRN: [id number]  YOB: 1960  Date of evaluation: 5/31/2023      Procedure Summary     Date: 05/31/23 Room / Location: [address] [place] [address] / [facility]    Anesthesia Start: 1100 Anesthesia Stop: 8969    Procedures:       LAPAROTOMY, distal small bowel bypasswith staples,enteroenterostomy (Abdomen)      CYSTOSCOPY STENT EXCHANGE RIGHT (Bladder) Diagnosis:       Small bowel obstruction ([address]      (Small bowel obstruction ([address] [O55.645])    Surgeons: Maryellen Ware MD; Carin Raza MD Responsible Provider: Fany Sánchez MD    Anesthesia Type: general ASA Status: 3          Anesthesia Type: No value filed. Cruz Phase I: Cruz Score: 9    Cruz Phase II: Cruz Score: 10      Anesthesia Post Evaluation    Patient location during evaluation: PACU  Patient participation: complete - patient participated  Level of consciousness: awake and alert  Airway patency: patent  Nausea & Vomiting: no nausea and no vomiting  Complications: no  Cardiovascular status: blood pressure returned to baseline  Respiratory status: acceptable  Hydration status: euvolemic  Comments: VSS on transfer to phase 2 recovery. No anesthetic complications.

## 2023-06-01 LAB
ANION GAP SERPL CALCULATED.3IONS-SCNC: 7 MMOL/L (ref 3–16)
BASOPHILS # BLD: 0.1 K/UL (ref 0–0.2)
BASOPHILS NFR BLD: 0.5 %
BUN SERPL-MCNC: 4 MG/DL (ref 7–20)
CALCIUM SERPL-MCNC: 7.8 MG/DL (ref 8.3–10.6)
CHLORIDE SERPL-SCNC: 101 MMOL/L (ref 99–110)
CO2 SERPL-SCNC: 24 MMOL/L (ref 21–32)
CREAT SERPL-MCNC: <0.5 MG/DL (ref 0.6–1.2)
DEPRECATED RDW RBC AUTO: 16 % (ref 12.4–15.4)
EOSINOPHIL # BLD: 0.2 K/UL (ref 0–0.6)
EOSINOPHIL NFR BLD: 1.2 %
GFR SERPLBLD CREATININE-BSD FMLA CKD-EPI: >60 ML/MIN/{1.73_M2}
GLUCOSE BLD-MCNC: 107 MG/DL (ref 70–99)
GLUCOSE BLD-MCNC: 114 MG/DL (ref 70–99)
GLUCOSE BLD-MCNC: 88 MG/DL (ref 70–99)
GLUCOSE BLD-MCNC: 88 MG/DL (ref 70–99)
GLUCOSE BLD-MCNC: 99 MG/DL (ref 70–99)
GLUCOSE BLD-MCNC: 99 MG/DL (ref 70–99)
GLUCOSE SERPL-MCNC: 111 MG/DL (ref 70–99)
HCT VFR BLD AUTO: 40 % (ref 36–48)
HGB BLD-MCNC: 12.8 G/DL (ref 12–16)
LYMPHOCYTES # BLD: 1 K/UL (ref 1–5.1)
LYMPHOCYTES NFR BLD: 7.1 %
MAGNESIUM SERPL-MCNC: 1.6 MG/DL (ref 1.8–2.4)
MCH RBC QN AUTO: 30.9 PG (ref 26–34)
MCHC RBC AUTO-ENTMCNC: 32.1 G/DL (ref 31–36)
MCV RBC AUTO: 96.4 FL (ref 80–100)
MONOCYTES # BLD: 0.7 K/UL (ref 0–1.3)
MONOCYTES NFR BLD: 5.4 %
NEUTROPHILS # BLD: 11.6 K/UL (ref 1.7–7.7)
NEUTROPHILS NFR BLD: 85.8 %
PERFORMED ON: ABNORMAL
PERFORMED ON: ABNORMAL
PERFORMED ON: NORMAL
PHOSPHATE SERPL-MCNC: 2.9 MG/DL (ref 2.5–4.9)
PLATELET # BLD AUTO: 179 K/UL (ref 135–450)
PMV BLD AUTO: 7.8 FL (ref 5–10.5)
POTASSIUM SERPL-SCNC: 3.1 MMOL/L (ref 3.5–5.1)
RBC # BLD AUTO: 4.14 M/UL (ref 4–5.2)
SODIUM SERPL-SCNC: 132 MMOL/L (ref 136–145)
WBC # BLD AUTO: 13.6 K/UL (ref 4–11)

## 2023-06-01 PROCEDURE — 6360000002 HC RX W HCPCS: Performed by: SURGERY

## 2023-06-01 PROCEDURE — 85025 COMPLETE CBC W/AUTO DIFF WBC: CPT

## 2023-06-01 PROCEDURE — 99024 POSTOP FOLLOW-UP VISIT: CPT

## 2023-06-01 PROCEDURE — 84100 ASSAY OF PHOSPHORUS: CPT

## 2023-06-01 PROCEDURE — 2580000003 HC RX 258: Performed by: SURGERY

## 2023-06-01 PROCEDURE — 6360000002 HC RX W HCPCS

## 2023-06-01 PROCEDURE — 80048 BASIC METABOLIC PNL TOTAL CA: CPT

## 2023-06-01 PROCEDURE — 83735 ASSAY OF MAGNESIUM: CPT

## 2023-06-01 PROCEDURE — 2500000003 HC RX 250 WO HCPCS: Performed by: SURGERY

## 2023-06-01 PROCEDURE — APPSS15 APP SPLIT SHARED TIME 0-15 MINUTES

## 2023-06-01 PROCEDURE — 2700000000 HC OXYGEN THERAPY PER DAY

## 2023-06-01 PROCEDURE — 94640 AIRWAY INHALATION TREATMENT: CPT

## 2023-06-01 PROCEDURE — 6370000000 HC RX 637 (ALT 250 FOR IP)

## 2023-06-01 PROCEDURE — 1200000000 HC SEMI PRIVATE

## 2023-06-01 PROCEDURE — 94761 N-INVAS EAR/PLS OXIMETRY MLT: CPT

## 2023-06-01 RX ORDER — MAGNESIUM SULFATE IN WATER 40 MG/ML
2000 INJECTION, SOLUTION INTRAVENOUS ONCE
Status: COMPLETED | OUTPATIENT
Start: 2023-06-01 | End: 2023-06-01

## 2023-06-01 RX ORDER — KETOROLAC TROMETHAMINE 30 MG/ML
15 INJECTION, SOLUTION INTRAMUSCULAR; INTRAVENOUS EVERY 6 HOURS
Status: DISCONTINUED | OUTPATIENT
Start: 2023-06-01 | End: 2023-06-05

## 2023-06-01 RX ORDER — POTASSIUM CHLORIDE 20 MEQ/1
40 TABLET, EXTENDED RELEASE ORAL ONCE
Status: DISCONTINUED | OUTPATIENT
Start: 2023-06-01 | End: 2023-06-01

## 2023-06-01 RX ADMIN — Medication 2 PUFF: at 19:15

## 2023-06-01 RX ADMIN — Medication 2 PUFF: at 07:37

## 2023-06-01 RX ADMIN — MORPHINE SULFATE 4 MG: 4 INJECTION, SOLUTION INTRAMUSCULAR; INTRAVENOUS at 08:22

## 2023-06-01 RX ADMIN — Medication 20 MG: at 08:21

## 2023-06-01 RX ADMIN — MORPHINE SULFATE 4 MG: 4 INJECTION, SOLUTION INTRAMUSCULAR; INTRAVENOUS at 18:42

## 2023-06-01 RX ADMIN — KETOROLAC TROMETHAMINE 15 MG: 30 INJECTION, SOLUTION INTRAMUSCULAR; INTRAVENOUS at 22:57

## 2023-06-01 RX ADMIN — MORPHINE SULFATE 4 MG: 4 INJECTION, SOLUTION INTRAMUSCULAR; INTRAVENOUS at 02:05

## 2023-06-01 RX ADMIN — MORPHINE SULFATE 4 MG: 4 INJECTION, SOLUTION INTRAMUSCULAR; INTRAVENOUS at 12:16

## 2023-06-01 RX ADMIN — ONDANSETRON 4 MG: 2 INJECTION INTRAMUSCULAR; INTRAVENOUS at 11:28

## 2023-06-01 RX ADMIN — DEXTROSE AND SODIUM CHLORIDE: 5; 450 INJECTION, SOLUTION INTRAVENOUS at 14:26

## 2023-06-01 RX ADMIN — MORPHINE SULFATE 4 MG: 4 INJECTION, SOLUTION INTRAMUSCULAR; INTRAVENOUS at 14:29

## 2023-06-01 RX ADMIN — TIOTROPIUM BROMIDE INHALATION SPRAY 2 PUFF: 3.12 SPRAY, METERED RESPIRATORY (INHALATION) at 07:38

## 2023-06-01 RX ADMIN — MORPHINE SULFATE 4 MG: 4 INJECTION, SOLUTION INTRAMUSCULAR; INTRAVENOUS at 04:05

## 2023-06-01 RX ADMIN — KETOROLAC TROMETHAMINE 15 MG: 30 INJECTION, SOLUTION INTRAMUSCULAR; INTRAVENOUS at 11:28

## 2023-06-01 RX ADMIN — Medication 20 MG: at 20:54

## 2023-06-01 RX ADMIN — KETOROLAC TROMETHAMINE 15 MG: 30 INJECTION, SOLUTION INTRAMUSCULAR; INTRAVENOUS at 17:33

## 2023-06-01 RX ADMIN — MORPHINE SULFATE 4 MG: 4 INJECTION, SOLUTION INTRAMUSCULAR; INTRAVENOUS at 20:54

## 2023-06-01 RX ADMIN — MORPHINE SULFATE 4 MG: 4 INJECTION, SOLUTION INTRAMUSCULAR; INTRAVENOUS at 00:04

## 2023-06-01 RX ADMIN — POTASSIUM BICARBONATE 40 MEQ: 782 TABLET, EFFERVESCENT ORAL at 10:20

## 2023-06-01 RX ADMIN — MAGNESIUM SULFATE HEPTAHYDRATE 2000 MG: 40 INJECTION, SOLUTION INTRAVENOUS at 10:17

## 2023-06-01 RX ADMIN — ONDANSETRON 4 MG: 2 INJECTION INTRAMUSCULAR; INTRAVENOUS at 05:36

## 2023-06-01 RX ADMIN — MORPHINE SULFATE 4 MG: 4 INJECTION, SOLUTION INTRAMUSCULAR; INTRAVENOUS at 10:20

## 2023-06-01 RX ADMIN — MORPHINE SULFATE 4 MG: 4 INJECTION, SOLUTION INTRAMUSCULAR; INTRAVENOUS at 16:36

## 2023-06-01 RX ADMIN — MORPHINE SULFATE 4 MG: 4 INJECTION, SOLUTION INTRAMUSCULAR; INTRAVENOUS at 06:06

## 2023-06-01 ASSESSMENT — PAIN DESCRIPTION - FREQUENCY
FREQUENCY: CONTINUOUS

## 2023-06-01 ASSESSMENT — PAIN SCALES - GENERAL
PAINLEVEL_OUTOF10: 10
PAINLEVEL_OUTOF10: 8
PAINLEVEL_OUTOF10: 7
PAINLEVEL_OUTOF10: 7
PAINLEVEL_OUTOF10: 8
PAINLEVEL_OUTOF10: 10
PAINLEVEL_OUTOF10: 4
PAINLEVEL_OUTOF10: 5
PAINLEVEL_OUTOF10: 7
PAINLEVEL_OUTOF10: 4
PAINLEVEL_OUTOF10: 10
PAINLEVEL_OUTOF10: 6
PAINLEVEL_OUTOF10: 10
PAINLEVEL_OUTOF10: 7
PAINLEVEL_OUTOF10: 4
PAINLEVEL_OUTOF10: 4
PAINLEVEL_OUTOF10: 6
PAINLEVEL_OUTOF10: 6
PAINLEVEL_OUTOF10: 10
PAINLEVEL_OUTOF10: 5
PAINLEVEL_OUTOF10: 5
PAINLEVEL_OUTOF10: 4
PAINLEVEL_OUTOF10: 6

## 2023-06-01 ASSESSMENT — PAIN DESCRIPTION - DESCRIPTORS
DESCRIPTORS: DISCOMFORT
DESCRIPTORS: SORE;TENDER
DESCRIPTORS: SORE
DESCRIPTORS: SORE
DESCRIPTORS: DISCOMFORT
DESCRIPTORS: SORE
DESCRIPTORS: DISCOMFORT
DESCRIPTORS: SORE;TENDER
DESCRIPTORS: DISCOMFORT
DESCRIPTORS: SORE

## 2023-06-01 ASSESSMENT — PAIN DESCRIPTION - ORIENTATION
ORIENTATION: RIGHT;LEFT;MID
ORIENTATION: MID;RIGHT;LEFT
ORIENTATION: MID
ORIENTATION: RIGHT;LEFT;MID

## 2023-06-01 ASSESSMENT — PAIN - FUNCTIONAL ASSESSMENT
PAIN_FUNCTIONAL_ASSESSMENT: PREVENTS OR INTERFERES SOME ACTIVE ACTIVITIES AND ADLS

## 2023-06-01 ASSESSMENT — PAIN DESCRIPTION - LOCATION
LOCATION: ABDOMEN

## 2023-06-01 ASSESSMENT — PAIN DESCRIPTION - ONSET
ONSET: ON-GOING

## 2023-06-01 ASSESSMENT — PAIN DESCRIPTION - PAIN TYPE
TYPE: SURGICAL PAIN

## 2023-06-02 LAB
ANION GAP SERPL CALCULATED.3IONS-SCNC: 5 MMOL/L (ref 3–16)
BUN SERPL-MCNC: 5 MG/DL (ref 7–20)
CALCIUM SERPL-MCNC: 7.6 MG/DL (ref 8.3–10.6)
CHLORIDE SERPL-SCNC: 103 MMOL/L (ref 99–110)
CO2 SERPL-SCNC: 27 MMOL/L (ref 21–32)
CREAT SERPL-MCNC: <0.5 MG/DL (ref 0.6–1.2)
GFR SERPLBLD CREATININE-BSD FMLA CKD-EPI: >60 ML/MIN/{1.73_M2}
GLUCOSE BLD-MCNC: 74 MG/DL (ref 70–99)
GLUCOSE BLD-MCNC: 89 MG/DL (ref 70–99)
GLUCOSE BLD-MCNC: 89 MG/DL (ref 70–99)
GLUCOSE BLD-MCNC: 90 MG/DL (ref 70–99)
GLUCOSE BLD-MCNC: 93 MG/DL (ref 70–99)
GLUCOSE BLD-MCNC: 94 MG/DL (ref 70–99)
GLUCOSE SERPL-MCNC: 108 MG/DL (ref 70–99)
MAGNESIUM SERPL-MCNC: 2.1 MG/DL (ref 1.8–2.4)
PERFORMED ON: NORMAL
POTASSIUM SERPL-SCNC: 3.1 MMOL/L (ref 3.5–5.1)
SODIUM SERPL-SCNC: 135 MMOL/L (ref 136–145)

## 2023-06-02 PROCEDURE — 94640 AIRWAY INHALATION TREATMENT: CPT

## 2023-06-02 PROCEDURE — 6360000002 HC RX W HCPCS: Performed by: SURGERY

## 2023-06-02 PROCEDURE — 97166 OT EVAL MOD COMPLEX 45 MIN: CPT

## 2023-06-02 PROCEDURE — 1200000000 HC SEMI PRIVATE

## 2023-06-02 PROCEDURE — 97116 GAIT TRAINING THERAPY: CPT

## 2023-06-02 PROCEDURE — A4216 STERILE WATER/SALINE, 10 ML: HCPCS | Performed by: SURGERY

## 2023-06-02 PROCEDURE — 2700000000 HC OXYGEN THERAPY PER DAY

## 2023-06-02 PROCEDURE — 2500000003 HC RX 250 WO HCPCS

## 2023-06-02 PROCEDURE — APPSS15 APP SPLIT SHARED TIME 0-15 MINUTES

## 2023-06-02 PROCEDURE — 99024 POSTOP FOLLOW-UP VISIT: CPT

## 2023-06-02 PROCEDURE — 83735 ASSAY OF MAGNESIUM: CPT

## 2023-06-02 PROCEDURE — 94761 N-INVAS EAR/PLS OXIMETRY MLT: CPT

## 2023-06-02 PROCEDURE — 2500000003 HC RX 250 WO HCPCS: Performed by: SURGERY

## 2023-06-02 PROCEDURE — 2580000003 HC RX 258: Performed by: SURGERY

## 2023-06-02 PROCEDURE — 97530 THERAPEUTIC ACTIVITIES: CPT

## 2023-06-02 PROCEDURE — 6360000002 HC RX W HCPCS

## 2023-06-02 PROCEDURE — 80048 BASIC METABOLIC PNL TOTAL CA: CPT

## 2023-06-02 PROCEDURE — 97162 PT EVAL MOD COMPLEX 30 MIN: CPT

## 2023-06-02 RX ORDER — DEXTROSE, SODIUM CHLORIDE, AND POTASSIUM CHLORIDE 5; .45; .15 G/100ML; G/100ML; G/100ML
INJECTION INTRAVENOUS CONTINUOUS
Status: DISCONTINUED | OUTPATIENT
Start: 2023-06-02 | End: 2023-06-05

## 2023-06-02 RX ADMIN — TIOTROPIUM BROMIDE INHALATION SPRAY 2 PUFF: 3.12 SPRAY, METERED RESPIRATORY (INHALATION) at 07:21

## 2023-06-02 RX ADMIN — KETOROLAC TROMETHAMINE 15 MG: 30 INJECTION, SOLUTION INTRAMUSCULAR; INTRAVENOUS at 05:29

## 2023-06-02 RX ADMIN — MORPHINE SULFATE 4 MG: 4 INJECTION, SOLUTION INTRAMUSCULAR; INTRAVENOUS at 22:25

## 2023-06-02 RX ADMIN — MORPHINE SULFATE 4 MG: 4 INJECTION, SOLUTION INTRAMUSCULAR; INTRAVENOUS at 01:22

## 2023-06-02 RX ADMIN — POTASSIUM CHLORIDE, DEXTROSE MONOHYDRATE AND SODIUM CHLORIDE: 150; 5; 450 INJECTION, SOLUTION INTRAVENOUS at 09:59

## 2023-06-02 RX ADMIN — MORPHINE SULFATE 4 MG: 4 INJECTION, SOLUTION INTRAMUSCULAR; INTRAVENOUS at 06:12

## 2023-06-02 RX ADMIN — Medication 10 ML: at 22:29

## 2023-06-02 RX ADMIN — Medication 20 MG: at 22:27

## 2023-06-02 RX ADMIN — Medication 2 PUFF: at 07:22

## 2023-06-02 RX ADMIN — Medication 2 PUFF: at 19:22

## 2023-06-02 RX ADMIN — KETOROLAC TROMETHAMINE 15 MG: 30 INJECTION, SOLUTION INTRAMUSCULAR; INTRAVENOUS at 16:48

## 2023-06-02 RX ADMIN — MORPHINE SULFATE 4 MG: 4 INJECTION, SOLUTION INTRAMUSCULAR; INTRAVENOUS at 19:13

## 2023-06-02 RX ADMIN — MORPHINE SULFATE 4 MG: 4 INJECTION, SOLUTION INTRAMUSCULAR; INTRAVENOUS at 16:48

## 2023-06-02 RX ADMIN — KETOROLAC TROMETHAMINE 15 MG: 30 INJECTION, SOLUTION INTRAMUSCULAR; INTRAVENOUS at 09:32

## 2023-06-02 RX ADMIN — MORPHINE SULFATE 2 MG: 2 INJECTION, SOLUTION INTRAMUSCULAR; INTRAVENOUS at 13:10

## 2023-06-02 RX ADMIN — Medication 20 MG: at 09:32

## 2023-06-02 RX ADMIN — MORPHINE SULFATE 2 MG: 2 INJECTION, SOLUTION INTRAMUSCULAR; INTRAVENOUS at 03:36

## 2023-06-02 RX ADMIN — MORPHINE SULFATE 2 MG: 2 INJECTION, SOLUTION INTRAMUSCULAR; INTRAVENOUS at 15:09

## 2023-06-02 RX ADMIN — ENOXAPARIN SODIUM 30 MG: 100 INJECTION SUBCUTANEOUS at 16:48

## 2023-06-02 RX ADMIN — KETOROLAC TROMETHAMINE 15 MG: 30 INJECTION, SOLUTION INTRAMUSCULAR; INTRAVENOUS at 22:27

## 2023-06-02 RX ADMIN — MORPHINE SULFATE 2 MG: 2 INJECTION, SOLUTION INTRAMUSCULAR; INTRAVENOUS at 11:21

## 2023-06-02 RX ADMIN — DEXTROSE AND SODIUM CHLORIDE: 5; 450 INJECTION, SOLUTION INTRAVENOUS at 01:35

## 2023-06-02 ASSESSMENT — PAIN DESCRIPTION - DESCRIPTORS
DESCRIPTORS: ACHING;SORE;SHARP
DESCRIPTORS: SORE;TENDER
DESCRIPTORS: SHARP;SHOOTING
DESCRIPTORS: TENDER;SORE
DESCRIPTORS: SORE;TENDER
DESCRIPTORS: ACHING;SORE
DESCRIPTORS: ACHING;SORE
DESCRIPTORS: SORE;TENDER
DESCRIPTORS: SORE
DESCRIPTORS: DULL;SHARP

## 2023-06-02 ASSESSMENT — PAIN DESCRIPTION - LOCATION
LOCATION: ABDOMEN

## 2023-06-02 ASSESSMENT — PAIN DESCRIPTION - ORIENTATION
ORIENTATION: RIGHT;LEFT;MID
ORIENTATION: MID
ORIENTATION: MID;RIGHT;LEFT
ORIENTATION: MID
ORIENTATION: MID
ORIENTATION: RIGHT;LEFT;DISTAL
ORIENTATION: RIGHT;LEFT;MID
ORIENTATION: MID
ORIENTATION: RIGHT;LEFT;MID

## 2023-06-02 ASSESSMENT — PAIN - FUNCTIONAL ASSESSMENT

## 2023-06-02 ASSESSMENT — PAIN SCALES - GENERAL
PAINLEVEL_OUTOF10: 5
PAINLEVEL_OUTOF10: 8
PAINLEVEL_OUTOF10: 9
PAINLEVEL_OUTOF10: 4
PAINLEVEL_OUTOF10: 9
PAINLEVEL_OUTOF10: 5
PAINLEVEL_OUTOF10: 7
PAINLEVEL_OUTOF10: 7
PAINLEVEL_OUTOF10: 8
PAINLEVEL_OUTOF10: 10
PAINLEVEL_OUTOF10: 6
PAINLEVEL_OUTOF10: 5
PAINLEVEL_OUTOF10: 10
PAINLEVEL_OUTOF10: 6
PAINLEVEL_OUTOF10: 10
PAINLEVEL_OUTOF10: 5
PAINLEVEL_OUTOF10: 7

## 2023-06-02 ASSESSMENT — PAIN DESCRIPTION - FREQUENCY
FREQUENCY: CONTINUOUS

## 2023-06-02 ASSESSMENT — PAIN DESCRIPTION - PAIN TYPE
TYPE: SURGICAL PAIN

## 2023-06-02 ASSESSMENT — PAIN DESCRIPTION - ONSET
ONSET: ON-GOING

## 2023-06-03 LAB
ANION GAP SERPL CALCULATED.3IONS-SCNC: 6 MMOL/L (ref 3–16)
BUN SERPL-MCNC: 4 MG/DL (ref 7–20)
CALCIUM SERPL-MCNC: 7.8 MG/DL (ref 8.3–10.6)
CHLORIDE SERPL-SCNC: 105 MMOL/L (ref 99–110)
CO2 SERPL-SCNC: 27 MMOL/L (ref 21–32)
CREAT SERPL-MCNC: <0.5 MG/DL (ref 0.6–1.2)
GFR SERPLBLD CREATININE-BSD FMLA CKD-EPI: >60 ML/MIN/{1.73_M2}
GLUCOSE BLD-MCNC: 72 MG/DL (ref 70–99)
GLUCOSE BLD-MCNC: 80 MG/DL (ref 70–99)
GLUCOSE BLD-MCNC: 84 MG/DL (ref 70–99)
GLUCOSE BLD-MCNC: 86 MG/DL (ref 70–99)
GLUCOSE BLD-MCNC: 90 MG/DL (ref 70–99)
GLUCOSE BLD-MCNC: 92 MG/DL (ref 70–99)
GLUCOSE SERPL-MCNC: 112 MG/DL (ref 70–99)
PERFORMED ON: NORMAL
POTASSIUM SERPL-SCNC: 3.3 MMOL/L (ref 3.5–5.1)
SODIUM SERPL-SCNC: 138 MMOL/L (ref 136–145)

## 2023-06-03 PROCEDURE — 94761 N-INVAS EAR/PLS OXIMETRY MLT: CPT

## 2023-06-03 PROCEDURE — 6360000002 HC RX W HCPCS

## 2023-06-03 PROCEDURE — 6360000002 HC RX W HCPCS: Performed by: SURGERY

## 2023-06-03 PROCEDURE — 94640 AIRWAY INHALATION TREATMENT: CPT

## 2023-06-03 PROCEDURE — 2500000003 HC RX 250 WO HCPCS

## 2023-06-03 PROCEDURE — 2580000003 HC RX 258: Performed by: SURGERY

## 2023-06-03 PROCEDURE — A4216 STERILE WATER/SALINE, 10 ML: HCPCS | Performed by: SURGERY

## 2023-06-03 PROCEDURE — 36592 COLLECT BLOOD FROM PICC: CPT

## 2023-06-03 PROCEDURE — 1200000000 HC SEMI PRIVATE

## 2023-06-03 PROCEDURE — 2700000000 HC OXYGEN THERAPY PER DAY

## 2023-06-03 PROCEDURE — 2500000003 HC RX 250 WO HCPCS: Performed by: SURGERY

## 2023-06-03 PROCEDURE — 80048 BASIC METABOLIC PNL TOTAL CA: CPT

## 2023-06-03 PROCEDURE — 6370000000 HC RX 637 (ALT 250 FOR IP): Performed by: SURGERY

## 2023-06-03 PROCEDURE — 99024 POSTOP FOLLOW-UP VISIT: CPT | Performed by: SURGERY

## 2023-06-03 RX ORDER — POTASSIUM CHLORIDE 29.8 MG/ML
20 INJECTION INTRAVENOUS ONCE
Status: COMPLETED | OUTPATIENT
Start: 2023-06-03 | End: 2023-06-03

## 2023-06-03 RX ORDER — POTASSIUM CHLORIDE 7.45 MG/ML
10 INJECTION INTRAVENOUS
Status: DISCONTINUED | OUTPATIENT
Start: 2023-06-03 | End: 2023-06-03 | Stop reason: ALTCHOICE

## 2023-06-03 RX ADMIN — POTASSIUM CHLORIDE, DEXTROSE MONOHYDRATE AND SODIUM CHLORIDE: 150; 5; 450 INJECTION, SOLUTION INTRAVENOUS at 23:55

## 2023-06-03 RX ADMIN — KETOROLAC TROMETHAMINE 15 MG: 30 INJECTION, SOLUTION INTRAMUSCULAR; INTRAVENOUS at 23:55

## 2023-06-03 RX ADMIN — ENOXAPARIN SODIUM 30 MG: 100 INJECTION SUBCUTANEOUS at 15:50

## 2023-06-03 RX ADMIN — MORPHINE SULFATE 4 MG: 4 INJECTION, SOLUTION INTRAMUSCULAR; INTRAVENOUS at 20:26

## 2023-06-03 RX ADMIN — KETOROLAC TROMETHAMINE 15 MG: 30 INJECTION, SOLUTION INTRAMUSCULAR; INTRAVENOUS at 06:05

## 2023-06-03 RX ADMIN — MORPHINE SULFATE 4 MG: 4 INJECTION, SOLUTION INTRAMUSCULAR; INTRAVENOUS at 00:51

## 2023-06-03 RX ADMIN — POTASSIUM CHLORIDE, DEXTROSE MONOHYDRATE AND SODIUM CHLORIDE: 150; 5; 450 INJECTION, SOLUTION INTRAVENOUS at 13:00

## 2023-06-03 RX ADMIN — MORPHINE SULFATE 4 MG: 4 INJECTION, SOLUTION INTRAMUSCULAR; INTRAVENOUS at 18:22

## 2023-06-03 RX ADMIN — Medication 20 MG: at 08:39

## 2023-06-03 RX ADMIN — MORPHINE SULFATE 4 MG: 4 INJECTION, SOLUTION INTRAMUSCULAR; INTRAVENOUS at 03:34

## 2023-06-03 RX ADMIN — MORPHINE SULFATE 4 MG: 4 INJECTION, SOLUTION INTRAMUSCULAR; INTRAVENOUS at 10:50

## 2023-06-03 RX ADMIN — Medication 20 MG: at 20:27

## 2023-06-03 RX ADMIN — TIOTROPIUM BROMIDE INHALATION SPRAY 2 PUFF: 3.12 SPRAY, METERED RESPIRATORY (INHALATION) at 07:45

## 2023-06-03 RX ADMIN — MORPHINE SULFATE 4 MG: 4 INJECTION, SOLUTION INTRAMUSCULAR; INTRAVENOUS at 15:50

## 2023-06-03 RX ADMIN — MORPHINE SULFATE 2 MG: 2 INJECTION, SOLUTION INTRAMUSCULAR; INTRAVENOUS at 23:55

## 2023-06-03 RX ADMIN — KETOROLAC TROMETHAMINE 15 MG: 30 INJECTION, SOLUTION INTRAMUSCULAR; INTRAVENOUS at 17:05

## 2023-06-03 RX ADMIN — MORPHINE SULFATE 4 MG: 4 INJECTION, SOLUTION INTRAMUSCULAR; INTRAVENOUS at 13:00

## 2023-06-03 RX ADMIN — MORPHINE SULFATE 4 MG: 4 INJECTION, SOLUTION INTRAMUSCULAR; INTRAVENOUS at 06:14

## 2023-06-03 RX ADMIN — PROCHLORPERAZINE EDISYLATE 10 MG: 5 INJECTION INTRAMUSCULAR; INTRAVENOUS at 03:34

## 2023-06-03 RX ADMIN — Medication 2 PUFF: at 07:45

## 2023-06-03 RX ADMIN — POTASSIUM CHLORIDE 20 MEQ: 29.8 INJECTION, SOLUTION INTRAVENOUS at 10:33

## 2023-06-03 RX ADMIN — Medication 2 PUFF: at 19:36

## 2023-06-03 RX ADMIN — MORPHINE SULFATE 4 MG: 4 INJECTION, SOLUTION INTRAMUSCULAR; INTRAVENOUS at 08:38

## 2023-06-03 RX ADMIN — KETOROLAC TROMETHAMINE 15 MG: 30 INJECTION, SOLUTION INTRAMUSCULAR; INTRAVENOUS at 10:50

## 2023-06-03 ASSESSMENT — PAIN SCALES - GENERAL
PAINLEVEL_OUTOF10: 8
PAINLEVEL_OUTOF10: 10
PAINLEVEL_OUTOF10: 9
PAINLEVEL_OUTOF10: 8
PAINLEVEL_OUTOF10: 9
PAINLEVEL_OUTOF10: 8
PAINLEVEL_OUTOF10: 10
PAINLEVEL_OUTOF10: 9
PAINLEVEL_OUTOF10: 0
PAINLEVEL_OUTOF10: 9

## 2023-06-03 ASSESSMENT — PAIN - FUNCTIONAL ASSESSMENT
PAIN_FUNCTIONAL_ASSESSMENT: ACTIVITIES ARE NOT PREVENTED
PAIN_FUNCTIONAL_ASSESSMENT: PREVENTS OR INTERFERES SOME ACTIVE ACTIVITIES AND ADLS
PAIN_FUNCTIONAL_ASSESSMENT: ACTIVITIES ARE NOT PREVENTED
PAIN_FUNCTIONAL_ASSESSMENT: PREVENTS OR INTERFERES SOME ACTIVE ACTIVITIES AND ADLS
PAIN_FUNCTIONAL_ASSESSMENT: ACTIVITIES ARE NOT PREVENTED
PAIN_FUNCTIONAL_ASSESSMENT: ACTIVITIES ARE NOT PREVENTED

## 2023-06-03 ASSESSMENT — PAIN DESCRIPTION - PAIN TYPE: TYPE: ACUTE PAIN

## 2023-06-03 ASSESSMENT — PAIN DESCRIPTION - DESCRIPTORS
DESCRIPTORS: ACHING
DESCRIPTORS: DULL;SHARP
DESCRIPTORS: DULL;SHARP
DESCRIPTORS: JABBING
DESCRIPTORS: ACHING;SHARP
DESCRIPTORS: DISCOMFORT
DESCRIPTORS: ACHING
DESCRIPTORS: STABBING;BURNING
DESCRIPTORS: DULL;SHARP

## 2023-06-03 ASSESSMENT — PAIN DESCRIPTION - ONSET: ONSET: ON-GOING

## 2023-06-03 ASSESSMENT — PAIN DESCRIPTION - LOCATION
LOCATION: ABDOMEN

## 2023-06-03 ASSESSMENT — PAIN DESCRIPTION - ORIENTATION
ORIENTATION: MID
ORIENTATION: LOWER
ORIENTATION: MID
ORIENTATION: LOWER
ORIENTATION: RIGHT;LEFT;LOWER
ORIENTATION: LOWER

## 2023-06-03 ASSESSMENT — PAIN DESCRIPTION - FREQUENCY: FREQUENCY: CONTINUOUS

## 2023-06-04 LAB
ANION GAP SERPL CALCULATED.3IONS-SCNC: 6 MMOL/L (ref 3–16)
ANISOCYTOSIS BLD QL SMEAR: ABNORMAL
BASOPHILS # BLD: 0 K/UL (ref 0–0.2)
BASOPHILS NFR BLD: 0.3 %
BUN SERPL-MCNC: 4 MG/DL (ref 7–20)
CALCIUM SERPL-MCNC: 8 MG/DL (ref 8.3–10.6)
CHLORIDE SERPL-SCNC: 103 MMOL/L (ref 99–110)
CO2 SERPL-SCNC: 26 MMOL/L (ref 21–32)
CREAT SERPL-MCNC: <0.5 MG/DL (ref 0.6–1.2)
DEPRECATED RDW RBC AUTO: 15.7 % (ref 12.4–15.4)
EOSINOPHIL # BLD: 0.2 K/UL (ref 0–0.6)
EOSINOPHIL NFR BLD: 1.9 %
GFR SERPLBLD CREATININE-BSD FMLA CKD-EPI: >60 ML/MIN/{1.73_M2}
GLUCOSE BLD-MCNC: 67 MG/DL (ref 70–99)
GLUCOSE BLD-MCNC: 69 MG/DL (ref 70–99)
GLUCOSE BLD-MCNC: 71 MG/DL (ref 70–99)
GLUCOSE BLD-MCNC: 73 MG/DL (ref 70–99)
GLUCOSE BLD-MCNC: 84 MG/DL (ref 70–99)
GLUCOSE BLD-MCNC: 84 MG/DL (ref 70–99)
GLUCOSE BLD-MCNC: 91 MG/DL (ref 70–99)
GLUCOSE SERPL-MCNC: 102 MG/DL (ref 70–99)
HCT VFR BLD AUTO: 36.4 % (ref 36–48)
HGB BLD-MCNC: 12.1 G/DL (ref 12–16)
LYMPHOCYTES # BLD: 0.9 K/UL (ref 1–5.1)
LYMPHOCYTES NFR BLD: 7.1 %
MCH RBC QN AUTO: 31.2 PG (ref 26–34)
MCHC RBC AUTO-ENTMCNC: 33.3 G/DL (ref 31–36)
MCV RBC AUTO: 93.8 FL (ref 80–100)
MONOCYTES # BLD: 1 K/UL (ref 0–1.3)
MONOCYTES NFR BLD: 7.7 %
NEUTROPHILS # BLD: 10.3 K/UL (ref 1.7–7.7)
NEUTROPHILS NFR BLD: 83 %
PATH INTERP BLD-IMP: NO
PERFORMED ON: ABNORMAL
PERFORMED ON: ABNORMAL
PERFORMED ON: NORMAL
PLATELET # BLD AUTO: 193 K/UL (ref 135–450)
PLATELET BLD QL SMEAR: ADEQUATE
PMV BLD AUTO: 7.1 FL (ref 5–10.5)
POTASSIUM SERPL-SCNC: 3.9 MMOL/L (ref 3.5–5.1)
RBC # BLD AUTO: 3.88 M/UL (ref 4–5.2)
SLIDE REVIEW: ABNORMAL
SODIUM SERPL-SCNC: 135 MMOL/L (ref 136–145)
WBC # BLD AUTO: 12.5 K/UL (ref 4–11)

## 2023-06-04 PROCEDURE — 97530 THERAPEUTIC ACTIVITIES: CPT

## 2023-06-04 PROCEDURE — 97535 SELF CARE MNGMENT TRAINING: CPT

## 2023-06-04 PROCEDURE — 94640 AIRWAY INHALATION TREATMENT: CPT

## 2023-06-04 PROCEDURE — 6360000002 HC RX W HCPCS: Performed by: SURGERY

## 2023-06-04 PROCEDURE — 2580000003 HC RX 258: Performed by: SURGERY

## 2023-06-04 PROCEDURE — 1200000000 HC SEMI PRIVATE

## 2023-06-04 PROCEDURE — 85025 COMPLETE CBC W/AUTO DIFF WBC: CPT

## 2023-06-04 PROCEDURE — 6360000002 HC RX W HCPCS

## 2023-06-04 PROCEDURE — 80048 BASIC METABOLIC PNL TOTAL CA: CPT

## 2023-06-04 PROCEDURE — 2500000003 HC RX 250 WO HCPCS: Performed by: SURGERY

## 2023-06-04 PROCEDURE — 99024 POSTOP FOLLOW-UP VISIT: CPT | Performed by: SURGERY

## 2023-06-04 PROCEDURE — 2500000003 HC RX 250 WO HCPCS

## 2023-06-04 PROCEDURE — A4216 STERILE WATER/SALINE, 10 ML: HCPCS | Performed by: SURGERY

## 2023-06-04 RX ADMIN — MORPHINE SULFATE 4 MG: 4 INJECTION, SOLUTION INTRAMUSCULAR; INTRAVENOUS at 13:28

## 2023-06-04 RX ADMIN — POTASSIUM CHLORIDE, DEXTROSE MONOHYDRATE AND SODIUM CHLORIDE: 150; 5; 450 INJECTION, SOLUTION INTRAVENOUS at 23:58

## 2023-06-04 RX ADMIN — MORPHINE SULFATE 4 MG: 4 INJECTION, SOLUTION INTRAMUSCULAR; INTRAVENOUS at 15:46

## 2023-06-04 RX ADMIN — ENOXAPARIN SODIUM 30 MG: 100 INJECTION SUBCUTANEOUS at 15:46

## 2023-06-04 RX ADMIN — TIOTROPIUM BROMIDE INHALATION SPRAY 2 PUFF: 3.12 SPRAY, METERED RESPIRATORY (INHALATION) at 07:35

## 2023-06-04 RX ADMIN — MORPHINE SULFATE 4 MG: 4 INJECTION, SOLUTION INTRAMUSCULAR; INTRAVENOUS at 05:51

## 2023-06-04 RX ADMIN — KETOROLAC TROMETHAMINE 15 MG: 30 INJECTION, SOLUTION INTRAMUSCULAR; INTRAVENOUS at 11:14

## 2023-06-04 RX ADMIN — MORPHINE SULFATE 4 MG: 4 INJECTION, SOLUTION INTRAMUSCULAR; INTRAVENOUS at 02:40

## 2023-06-04 RX ADMIN — Medication 2 PUFF: at 19:48

## 2023-06-04 RX ADMIN — KETOROLAC TROMETHAMINE 15 MG: 30 INJECTION, SOLUTION INTRAMUSCULAR; INTRAVENOUS at 05:51

## 2023-06-04 RX ADMIN — MORPHINE SULFATE 4 MG: 4 INJECTION, SOLUTION INTRAMUSCULAR; INTRAVENOUS at 23:59

## 2023-06-04 RX ADMIN — POTASSIUM CHLORIDE, DEXTROSE MONOHYDRATE AND SODIUM CHLORIDE: 150; 5; 450 INJECTION, SOLUTION INTRAVENOUS at 11:18

## 2023-06-04 RX ADMIN — KETOROLAC TROMETHAMINE 15 MG: 30 INJECTION, SOLUTION INTRAMUSCULAR; INTRAVENOUS at 23:02

## 2023-06-04 RX ADMIN — MORPHINE SULFATE 4 MG: 4 INJECTION, SOLUTION INTRAMUSCULAR; INTRAVENOUS at 22:04

## 2023-06-04 RX ADMIN — Medication 2 PUFF: at 07:35

## 2023-06-04 RX ADMIN — Medication 20 MG: at 08:31

## 2023-06-04 RX ADMIN — MORPHINE SULFATE 4 MG: 4 INJECTION, SOLUTION INTRAMUSCULAR; INTRAVENOUS at 17:52

## 2023-06-04 RX ADMIN — KETOROLAC TROMETHAMINE 15 MG: 30 INJECTION, SOLUTION INTRAMUSCULAR; INTRAVENOUS at 17:17

## 2023-06-04 RX ADMIN — MORPHINE SULFATE 4 MG: 4 INJECTION, SOLUTION INTRAMUSCULAR; INTRAVENOUS at 08:32

## 2023-06-04 RX ADMIN — Medication 20 MG: at 20:10

## 2023-06-04 RX ADMIN — Medication 10 ML: at 20:10

## 2023-06-04 RX ADMIN — MORPHINE SULFATE 4 MG: 4 INJECTION, SOLUTION INTRAMUSCULAR; INTRAVENOUS at 20:10

## 2023-06-04 RX ADMIN — MORPHINE SULFATE 4 MG: 4 INJECTION, SOLUTION INTRAMUSCULAR; INTRAVENOUS at 11:14

## 2023-06-04 ASSESSMENT — PAIN DESCRIPTION - PAIN TYPE
TYPE: SURGICAL PAIN
TYPE: ACUTE PAIN;CHRONIC PAIN
TYPE: SURGICAL PAIN
TYPE: ACUTE PAIN;CHRONIC PAIN
TYPE: SURGICAL PAIN

## 2023-06-04 ASSESSMENT — PAIN DESCRIPTION - ORIENTATION
ORIENTATION: LOWER
ORIENTATION: RIGHT;LEFT;MID
ORIENTATION: LOWER
ORIENTATION: RIGHT;LEFT;MID
ORIENTATION: RIGHT;LEFT;MID
ORIENTATION: LOWER
ORIENTATION: RIGHT;LEFT;MID

## 2023-06-04 ASSESSMENT — PAIN SCALES - GENERAL
PAINLEVEL_OUTOF10: 9
PAINLEVEL_OUTOF10: 5
PAINLEVEL_OUTOF10: 8
PAINLEVEL_OUTOF10: 5
PAINLEVEL_OUTOF10: 9
PAINLEVEL_OUTOF10: 10
PAINLEVEL_OUTOF10: 8
PAINLEVEL_OUTOF10: 8
PAINLEVEL_OUTOF10: 5
PAINLEVEL_OUTOF10: 9
PAINLEVEL_OUTOF10: 8
PAINLEVEL_OUTOF10: 9

## 2023-06-04 ASSESSMENT — PAIN DESCRIPTION - DESCRIPTORS
DESCRIPTORS: STABBING
DESCRIPTORS: JABBING
DESCRIPTORS: SORE
DESCRIPTORS: SHARP;DULL
DESCRIPTORS: SORE;TENDER
DESCRIPTORS: SORE;TENDER
DESCRIPTORS: SHARP;DULL
DESCRIPTORS: SORE;STABBING
DESCRIPTORS: DULL;SHARP
DESCRIPTORS: SHARP;DULL
DESCRIPTORS: DULL
DESCRIPTORS: SHARP;DULL

## 2023-06-04 ASSESSMENT — PAIN DESCRIPTION - FREQUENCY
FREQUENCY: CONTINUOUS

## 2023-06-04 ASSESSMENT — PAIN - FUNCTIONAL ASSESSMENT
PAIN_FUNCTIONAL_ASSESSMENT: ACTIVITIES ARE NOT PREVENTED
PAIN_FUNCTIONAL_ASSESSMENT: PREVENTS OR INTERFERES SOME ACTIVE ACTIVITIES AND ADLS
PAIN_FUNCTIONAL_ASSESSMENT: ACTIVITIES ARE NOT PREVENTED
PAIN_FUNCTIONAL_ASSESSMENT: ACTIVITIES ARE NOT PREVENTED

## 2023-06-04 ASSESSMENT — PAIN DESCRIPTION - LOCATION
LOCATION: ABDOMEN

## 2023-06-04 ASSESSMENT — PAIN DESCRIPTION - ONSET
ONSET: ON-GOING

## 2023-06-05 LAB
ANION GAP SERPL CALCULATED.3IONS-SCNC: 6 MMOL/L (ref 3–16)
BUN SERPL-MCNC: 4 MG/DL (ref 7–20)
CALCIUM SERPL-MCNC: 7.8 MG/DL (ref 8.3–10.6)
CHLORIDE SERPL-SCNC: 98 MMOL/L (ref 99–110)
CO2 SERPL-SCNC: 27 MMOL/L (ref 21–32)
CREAT SERPL-MCNC: <0.5 MG/DL (ref 0.6–1.2)
GFR SERPLBLD CREATININE-BSD FMLA CKD-EPI: >60 ML/MIN/{1.73_M2}
GLUCOSE BLD-MCNC: 53 MG/DL (ref 70–99)
GLUCOSE BLD-MCNC: 60 MG/DL (ref 70–99)
GLUCOSE BLD-MCNC: 62 MG/DL (ref 70–99)
GLUCOSE BLD-MCNC: 73 MG/DL (ref 70–99)
GLUCOSE BLD-MCNC: 74 MG/DL (ref 70–99)
GLUCOSE BLD-MCNC: 76 MG/DL (ref 70–99)
GLUCOSE BLD-MCNC: 82 MG/DL (ref 70–99)
GLUCOSE BLD-MCNC: 84 MG/DL (ref 70–99)
GLUCOSE BLD-MCNC: 92 MG/DL (ref 70–99)
GLUCOSE BLD-MCNC: 98 MG/DL (ref 70–99)
GLUCOSE SERPL-MCNC: 98 MG/DL (ref 70–99)
PERFORMED ON: ABNORMAL
PERFORMED ON: NORMAL
POTASSIUM SERPL-SCNC: 4 MMOL/L (ref 3.5–5.1)
SODIUM SERPL-SCNC: 131 MMOL/L (ref 136–145)

## 2023-06-05 PROCEDURE — 94761 N-INVAS EAR/PLS OXIMETRY MLT: CPT

## 2023-06-05 PROCEDURE — 6370000000 HC RX 637 (ALT 250 FOR IP)

## 2023-06-05 PROCEDURE — 2500000003 HC RX 250 WO HCPCS: Performed by: SURGERY

## 2023-06-05 PROCEDURE — 97116 GAIT TRAINING THERAPY: CPT

## 2023-06-05 PROCEDURE — 1200000000 HC SEMI PRIVATE

## 2023-06-05 PROCEDURE — 80048 BASIC METABOLIC PNL TOTAL CA: CPT

## 2023-06-05 PROCEDURE — 6360000002 HC RX W HCPCS: Performed by: SURGERY

## 2023-06-05 PROCEDURE — 97530 THERAPEUTIC ACTIVITIES: CPT

## 2023-06-05 PROCEDURE — 94640 AIRWAY INHALATION TREATMENT: CPT

## 2023-06-05 PROCEDURE — 6360000002 HC RX W HCPCS

## 2023-06-05 PROCEDURE — 6370000000 HC RX 637 (ALT 250 FOR IP): Performed by: SURGERY

## 2023-06-05 PROCEDURE — APPSS15 APP SPLIT SHARED TIME 0-15 MINUTES

## 2023-06-05 PROCEDURE — 99024 POSTOP FOLLOW-UP VISIT: CPT

## 2023-06-05 PROCEDURE — 2580000003 HC RX 258: Performed by: SURGERY

## 2023-06-05 RX ORDER — MORPHINE SULFATE 30 MG/1
30 TABLET, FILM COATED, EXTENDED RELEASE ORAL EVERY 12 HOURS SCHEDULED
Status: DISCONTINUED | OUTPATIENT
Start: 2023-06-05 | End: 2023-06-06 | Stop reason: HOSPADM

## 2023-06-05 RX ORDER — GABAPENTIN 400 MG/1
400 CAPSULE ORAL 3 TIMES DAILY
Status: DISCONTINUED | OUTPATIENT
Start: 2023-06-05 | End: 2023-06-06 | Stop reason: HOSPADM

## 2023-06-05 RX ORDER — POLYETHYLENE GLYCOL 3350 17 G/17G
17 POWDER, FOR SOLUTION ORAL DAILY PRN
Status: DISCONTINUED | OUTPATIENT
Start: 2023-06-05 | End: 2023-06-06 | Stop reason: HOSPADM

## 2023-06-05 RX ORDER — FAMOTIDINE 20 MG/1
20 TABLET, FILM COATED ORAL 2 TIMES DAILY
Status: DISCONTINUED | OUTPATIENT
Start: 2023-06-05 | End: 2023-06-06

## 2023-06-05 RX ORDER — MORPHINE SULFATE 30 MG/1
30 CAPSULE, EXTENDED RELEASE ORAL 2 TIMES DAILY
Status: DISCONTINUED | OUTPATIENT
Start: 2023-06-05 | End: 2023-06-05

## 2023-06-05 RX ORDER — OXYCODONE HYDROCHLORIDE 5 MG/1
10 TABLET ORAL EVERY 4 HOURS PRN
Status: DISCONTINUED | OUTPATIENT
Start: 2023-06-05 | End: 2023-06-06 | Stop reason: HOSPADM

## 2023-06-05 RX ADMIN — MORPHINE SULFATE 4 MG: 4 INJECTION, SOLUTION INTRAMUSCULAR; INTRAVENOUS at 02:48

## 2023-06-05 RX ADMIN — KETOROLAC TROMETHAMINE 15 MG: 30 INJECTION, SOLUTION INTRAMUSCULAR; INTRAVENOUS at 05:11

## 2023-06-05 RX ADMIN — FAMOTIDINE 20 MG: 20 TABLET ORAL at 20:42

## 2023-06-05 RX ADMIN — Medication 2 PUFF: at 19:10

## 2023-06-05 RX ADMIN — MORPHINE SULFATE 4 MG: 4 INJECTION, SOLUTION INTRAMUSCULAR; INTRAVENOUS at 06:31

## 2023-06-05 RX ADMIN — TIOTROPIUM BROMIDE INHALATION SPRAY 2 PUFF: 3.12 SPRAY, METERED RESPIRATORY (INHALATION) at 07:17

## 2023-06-05 RX ADMIN — Medication 20 MG: at 08:39

## 2023-06-05 RX ADMIN — GABAPENTIN 400 MG: 400 CAPSULE ORAL at 08:39

## 2023-06-05 RX ADMIN — DEXTROSE MONOHYDRATE 125 ML: 10 INJECTION, SOLUTION INTRAVENOUS at 17:21

## 2023-06-05 RX ADMIN — Medication 10 ML: at 20:42

## 2023-06-05 RX ADMIN — ENOXAPARIN SODIUM 30 MG: 100 INJECTION SUBCUTANEOUS at 16:15

## 2023-06-05 RX ADMIN — MORPHINE SULFATE 30 MG: 30 TABLET, FILM COATED, EXTENDED RELEASE ORAL at 09:58

## 2023-06-05 RX ADMIN — Medication 2 PUFF: at 07:22

## 2023-06-05 RX ADMIN — OXYCODONE HYDROCHLORIDE 10 MG: 5 TABLET ORAL at 08:39

## 2023-06-05 RX ADMIN — MORPHINE SULFATE 30 MG: 30 TABLET, FILM COATED, EXTENDED RELEASE ORAL at 20:42

## 2023-06-05 ASSESSMENT — PAIN DESCRIPTION - ONSET
ONSET: ON-GOING

## 2023-06-05 ASSESSMENT — PAIN DESCRIPTION - FREQUENCY
FREQUENCY: CONTINUOUS

## 2023-06-05 ASSESSMENT — PAIN DESCRIPTION - LOCATION
LOCATION: ABDOMEN

## 2023-06-05 ASSESSMENT — PAIN DESCRIPTION - PAIN TYPE
TYPE: SURGICAL PAIN

## 2023-06-05 ASSESSMENT — PAIN DESCRIPTION - ORIENTATION
ORIENTATION: RIGHT;LEFT;MID

## 2023-06-05 ASSESSMENT — PAIN SCALES - GENERAL
PAINLEVEL_OUTOF10: 5
PAINLEVEL_OUTOF10: 6
PAINLEVEL_OUTOF10: 8
PAINLEVEL_OUTOF10: 5
PAINLEVEL_OUTOF10: 8
PAINLEVEL_OUTOF10: 5

## 2023-06-05 ASSESSMENT — PAIN DESCRIPTION - DESCRIPTORS
DESCRIPTORS: SORE;TENDER

## 2023-06-05 ASSESSMENT — PAIN - FUNCTIONAL ASSESSMENT: PAIN_FUNCTIONAL_ASSESSMENT: ACTIVITIES ARE NOT PREVENTED

## 2023-06-05 NOTE — PLAN OF CARE
Problem: Discharge Planning  Goal: Discharge to home or other facility with appropriate resources  6/4/2023 2133 by Jane Alcala RN  Outcome: Progressing  6/4/2023 1252 by Rafaela Luna RN  Outcome: Progressing  Flowsheets (Taken 6/4/2023 0148)  Discharge to home or other facility with appropriate resources: Identify barriers to discharge with patient and caregiver     Problem: Pain  Goal: Verbalizes/displays adequate comfort level or baseline comfort level  6/4/2023 2133 by Jane Alcala RN  Outcome: Progressing  6/4/2023 1252 by Rafaela Luna RN  Outcome: Progressing     Problem: Skin/Tissue Integrity  Goal: Absence of new skin breakdown  Description: 1. Monitor for areas of redness and/or skin breakdown  2. Assess vascular access sites hourly  3. Every 4-6 hours minimum:  Change oxygen saturation probe site  4. Every 4-6 hours:  If on nasal continuous positive airway pressure, respiratory therapy assess nares and determine need for appliance change or resting period.   6/4/2023 2133 by Jane Alcala RN  Outcome: Progressing  6/4/2023 1252 by Rafaela Luna RN  Outcome: Progressing     Problem: Safety - Adult  Goal: Free from fall injury  6/4/2023 2133 by Jane Alcala RN  Outcome: Progressing  6/4/2023 1252 by Rafaela Luna RN  Outcome: Progressing

## 2023-06-05 NOTE — CARE COORDINATION
INTERDISCIPLINARY PLAN OF CARE CONFERENCE    Date/Time: 6/5/2023 3:06 PM  Completed by: Mone Dillon RN, Case Management      Patient Name:  Jennifer Escobar  YOB: 1960  Admitting Diagnosis: Small bowel obstruction [facility]) [R63.791]     Admit Date/Time:  5/30/2023  2:33 PM    Chart reviewed. Interdisciplinary team contacted or reviewed plan related to patient progress and discharge plans. Disciplines included Case Management, Nursing, and Dietitian. Current Status:5/30/23  PT/OT recommendation for discharge plan of care:     Expected D/C Disposition:  Home  Discharge Plan Comments: Possible discharge tomorrow. Pt doing very well today. Expects discharge tomorrow. Will resume ACM HC. Denies CM needs.      Home O2 in place on admit: N/A

## 2023-06-05 NOTE — PLAN OF CARE
Problem: Discharge Planning  Goal: Discharge to home or other facility with appropriate resources  6/4/2023 2133 by Gino Morgan RN  Outcome: Progressing     Problem: Pain  Goal: Verbalizes/displays adequate comfort level or baseline comfort level  6/5/2023 1051 by Félix Akbar RN  Outcome: Progressing  6/4/2023 2133 by Gino Morgan RN  Outcome: Progressing     Problem: Skin/Tissue Integrity  Goal: Absence of new skin breakdown  Description: 1. Monitor for areas of redness and/or skin breakdown  2. Assess vascular access sites hourly  3. Every 4-6 hours minimum:  Change oxygen saturation probe site  4. Every 4-6 hours:  If on nasal continuous positive airway pressure, respiratory therapy assess nares and determine need for appliance change or resting period.   6/4/2023 2133 by Gino Morgan RN  Outcome: Progressing     Problem: Safety - Adult  Goal: Free from fall injury  6/5/2023 1051 by Félix Akbar RN  Outcome: Progressing  6/4/2023 2133 by Gino Morgan RN  Outcome: Progressing     Problem: ABCDS Injury Assessment  Goal: Absence of physical injury  Outcome: Progressing     Problem: Nutrition Deficit:  Goal: Optimize nutritional status  Outcome: Progressing

## 2023-06-06 VITALS
OXYGEN SATURATION: 90 % | HEART RATE: 68 BPM | DIASTOLIC BLOOD PRESSURE: 60 MMHG | SYSTOLIC BLOOD PRESSURE: 90 MMHG | RESPIRATION RATE: 16 BRPM | BODY MASS INDEX: 15.7 KG/M2 | TEMPERATURE: 97.6 F | WEIGHT: 88.6 LBS | HEIGHT: 63 IN

## 2023-06-06 PROBLEM — K56.609 SMALL BOWEL OBSTRUCTION (HCC): Status: RESOLVED | Noted: 2023-05-30 | Resolved: 2023-06-06

## 2023-06-06 LAB
GLUCOSE BLD-MCNC: 69 MG/DL (ref 70–99)
GLUCOSE BLD-MCNC: 74 MG/DL (ref 70–99)
GLUCOSE BLD-MCNC: 90 MG/DL (ref 70–99)
PERFORMED ON: ABNORMAL
PERFORMED ON: NORMAL
PERFORMED ON: NORMAL

## 2023-06-06 PROCEDURE — 6370000000 HC RX 637 (ALT 250 FOR IP): Performed by: SURGERY

## 2023-06-06 PROCEDURE — 99024 POSTOP FOLLOW-UP VISIT: CPT

## 2023-06-06 PROCEDURE — 94640 AIRWAY INHALATION TREATMENT: CPT

## 2023-06-06 PROCEDURE — 94761 N-INVAS EAR/PLS OXIMETRY MLT: CPT

## 2023-06-06 PROCEDURE — 2580000003 HC RX 258: Performed by: SURGERY

## 2023-06-06 PROCEDURE — 6370000000 HC RX 637 (ALT 250 FOR IP)

## 2023-06-06 PROCEDURE — APPSS30 APP SPLIT SHARED TIME 16-30 MINUTES

## 2023-06-06 RX ORDER — PANTOPRAZOLE SODIUM 40 MG/1
40 TABLET, DELAYED RELEASE ORAL ONCE
Status: COMPLETED | OUTPATIENT
Start: 2023-06-06 | End: 2023-06-06

## 2023-06-06 RX ADMIN — Medication 2 PUFF: at 07:33

## 2023-06-06 RX ADMIN — FAMOTIDINE 20 MG: 20 TABLET ORAL at 08:47

## 2023-06-06 RX ADMIN — OXYCODONE HYDROCHLORIDE 10 MG: 5 TABLET ORAL at 04:07

## 2023-06-06 RX ADMIN — PANTOPRAZOLE SODIUM 40 MG: 40 TABLET, DELAYED RELEASE ORAL at 10:47

## 2023-06-06 RX ADMIN — GABAPENTIN 400 MG: 400 CAPSULE ORAL at 08:47

## 2023-06-06 RX ADMIN — TIOTROPIUM BROMIDE INHALATION SPRAY 2 PUFF: 3.12 SPRAY, METERED RESPIRATORY (INHALATION) at 07:34

## 2023-06-06 RX ADMIN — Medication 10 ML: at 08:47

## 2023-06-06 ASSESSMENT — PAIN DESCRIPTION - DESCRIPTORS: DESCRIPTORS: SORE;STABBING

## 2023-06-06 ASSESSMENT — PAIN DESCRIPTION - ONSET: ONSET: GRADUAL

## 2023-06-06 ASSESSMENT — PAIN DESCRIPTION - ORIENTATION: ORIENTATION: RIGHT;LEFT;MID

## 2023-06-06 ASSESSMENT — PAIN SCALES - GENERAL
PAINLEVEL_OUTOF10: 5
PAINLEVEL_OUTOF10: 8

## 2023-06-06 ASSESSMENT — PAIN DESCRIPTION - LOCATION: LOCATION: ABDOMEN

## 2023-06-06 ASSESSMENT — PAIN DESCRIPTION - PAIN TYPE: TYPE: CHRONIC PAIN;SURGICAL PAIN

## 2023-06-06 ASSESSMENT — PAIN DESCRIPTION - FREQUENCY: FREQUENCY: INTERMITTENT

## 2023-06-06 NOTE — DISCHARGE INSTRUCTIONS
directed. Do NOT drive while taking your narcotic pain medicine. You can resume driving when you feel capable of responding to urgent situation if needed and not taking prescription pain medication. Watch for signs of infection:   Fever over 100.5°   Excessive warmth or bright redness around your incisions  Leakage of bloody or cloudy fluid from you incisions    If your procedure was done laparoscopically, gas is pumped into the abdominal cavity to help your surgeon with visualization during the procedure. You may feel abdominal, shoulder, or rib pain for a few days due to this. Utilize your pain medicine as directed. If you experience constipation  Increase your water intake and activity; walking is best.  A stool softener or mild laxative may be necessary if you still have not had a bowel  movement ; call the office for further instructions. Call the office with questions or concerns.

## 2023-06-06 NOTE — CARE COORDINATION
DISCHARGE ORDER  Date/Time 2023 10:31 AM  Completed by: Raymundo Leon RN, Case Management    Patient Name: Juanita Morales      : 1960  Admitting Diagnosis: Small bowel obstruction ([facility] [E98.144]      Admit order Date and Status:22  (verify MD's last order for status of admission)      Noted discharge order. If applicable PT/OT recommendation at Discharge: Home with PRN assist and HHPT. DME recommendation by PT/OT: N/A  Confirmed discharge plan    Discharge Plan: Chart reviewed. Met with pt at bedside and explained the role of the CM. Discharging home with AC HC. Pt leaving by private vehicle. Pt is independent with care. Date of Last IMM Given: N/A      Has Home O2 in place on admit:  N/A    Discharge timeout done with Jatin Winters RN. All discharge needs and concerns addressed.

## 2023-06-06 NOTE — DISCHARGE SUMMARY
0741 06/06/23 0802 06/06/23 0845   BP: (!) 109/54  (!) 94/53 90/60   Pulse: 96  68    Resp: 16  16    Temp: 98 °F (36.7 °C)  97.6 °F (36.4 °C)    TempSrc: Oral  Oral    SpO2: 91% 90%     Weight:       Height:         Gen: No distress. Alert. Chronically ill appearing. Frail appearance. Resp: No accessory muscle use. No crackles. No wheezes. No rhonchi. CV: Regular rate. Regular rhythm. No murmur. No rub. No edema. GI: Non tender. Distension improved. No masses. No organomegaly. +bowel sounds. No hernia. Left sided PEG tube in place. Surgical site with some irritation around staple but otherwise no evidence of dehiscence or drainage. Skin: Warm and dry. No nodule on exposed extremities. No rash on exposed extremities. Condition at discharge: stable    HPI:  Jennifer Escobar is a 57 y.o. female with a pmhx of Colon Cancer and COPD who presented to [facility] with abdominal pain, distention, nausea and vomiting. States that her vomiting was profuse, brown, smelled like stool and tasted like what she would imagine stool tastes like. 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 She follows with Dr. Shahrzad Miramontes with Oncology and has been receiving chemotherapy for this as well. Feeding tube was converted to a G tube earlier this year. G tube was placed to intermittent suction at [facility] and it was thought she may have been improving. PO trial there resulted in recurrent vomiting and patient was discharged from [facility] as recommended to present here for further surgical management. She does endorse further nausea and vomiting between [facility] and [facility] prior to arrival.      CT from [facility] displays recurrent and significant tumor burden on the right side of her abdomen and SBO. Serial AXRs display persistent SBO. Hospital Course:  POD#6 following above procedure. Pain controlled. Passing gas.  Tolerating diet and

## 2023-06-06 NOTE — PLAN OF CARE
Problem: Pain  Goal: Verbalizes/displays adequate comfort level or baseline comfort level  Outcome: Progressing     Problem: Safety - Adult  Goal: Free from fall injury  6/5/2023 2114 by Yuniel Velazquez RN  Outcome: Progressing  6/5/2023 1051 by Yuniel Velazquez RN  Outcome: Progressing     Problem: ABCDS Injury Assessment  Goal: Absence of physical injury  6/5/2023 2114 by Yuniel Velazquez RN  Outcome: Progressing  6/5/2023 1051 by Yuniel Velazquez RN  Outcome: Progressing     Problem: Nutrition Deficit:  Goal: Optimize nutritional status  6/5/2023 2114 by Yuniel Velazquez RN  Outcome: Progressing  6/5/2023 1051 by Yuniel Velazquez RN  Outcome: Progressing

## 2023-06-06 NOTE — DISCHARGE INSTR - COC
Continuity of Care Form    Patient Name: Mark Bridges   :  1960  MRN:  [id number]    Admit date:  2023  Discharge date:  23    Code Status Order: [facility]   Advance Directives:   Jim Directive Type of Healthcare Directive Copy in [address] Agent's Name Healthcare Agent's Phone Number    [phone] Yes, patient has an advance directive for healthcare treatment -- -- -- -- --            Admitting Physician:  Tunde Cox MD  PCP: GUY Bauer CNP    Discharging Nurse: [facility] Unit/Room#: 0203/0203-01  Discharging Unit Phone Number: [phone]    Emergency Contact:   Extended Emergency Contact Information  Primary Emergency Contact: Mariel Smith  Mobile Phone: [phone]  Relation: Other  Secondary Emergency Contact: Tito Pollack  Mobile Phone: [phone]  Relation: Other    Past Surgical History:  Past Surgical History:   Procedure Laterality Date    BLADDER SURGERY N/A 2023    CYSTOSCOPY STENT EXCHANGE RIGHT performed by Sharon Lee MD at [address] [facility] Right 5/10/2022    RIGHT COLECTOMY, PARTIAL DUODENECTOMY, SMALL BOWEL RESECTION, CHOLECYSTECTOMY, INSERTION OF FEEDING JEJUNOSTOMY TUBE performed by Tunde Cox MD at [address] [facility] N/A 2023    LAPAROTOMY, distal small bowel bypasswith staples,enteroenterostomy performed by Tunde Cox MD at [address] 3/1/2023    EGD/PEG W/ANES.  (9:30) performed by Carrillo Mae MD at [address] [facility]       Immunization History:   Immunization History   Administered Date(s) Administered    COVID-19, J&J, (age 18y+), IM, 0.5 mL 2021    COVID-19, MODERNA BLUE border, Primary or Immunocompromised, (age 12y+), IM, 100 mcg/0.5mL 2021, 2022    Pneumococcal,

## 2023-06-06 NOTE — PLAN OF CARE
Problem: Discharge Planning  Goal: Discharge to home or other facility with appropriate resources  Outcome: Progressing     Problem: Pain  Goal: Verbalizes/displays adequate comfort level or baseline comfort level  Outcome: Progressing     Problem: Skin/Tissue Integrity  Goal: Absence of new skin breakdown  Description: 1. Monitor for areas of redness and/or skin breakdown  2. Assess vascular access sites hourly  3. Every 4-6 hours minimum:  Change oxygen saturation probe site  4. Every 4-6 hours:  If on nasal continuous positive airway pressure, respiratory therapy assess nares and determine need for appliance change or resting period.   Outcome: Progressing     Problem: Safety - Adult  Goal: Free from fall injury  6/6/2023 0949 by Filemon Melendrez RN  Outcome: Progressing  6/5/2023 2114 by Adriana Cooley RN  Outcome: Progressing     Problem: ABCDS Injury Assessment  Goal: Absence of physical injury  6/6/2023 0949 by Filemon Melendrez RN  Outcome: Progressing  6/5/2023 2114 by Adriana Cooley RN  Outcome: Progressing

## 2023-06-06 NOTE — PLAN OF CARE
Problem: Discharge Planning  Goal: Discharge to home or other facility with appropriate resources  6/6/2023 1032 by Temitope Matamoros RN  Outcome: Completed  6/6/2023 0949 by Temitope Matamoros RN  Outcome: Progressing     Problem: Pain  Goal: Verbalizes/displays adequate comfort level or baseline comfort level  6/6/2023 1032 by Temitope Matamoros RN  Outcome: Completed  6/6/2023 0949 by Temitope Matamoros RN  Outcome: Progressing     Problem: Skin/Tissue Integrity  Goal: Absence of new skin breakdown  Description: 1. Monitor for areas of redness and/or skin breakdown  2. Assess vascular access sites hourly  3. Every 4-6 hours minimum:  Change oxygen saturation probe site  4. Every 4-6 hours:  If on nasal continuous positive airway pressure, respiratory therapy assess nares and determine need for appliance change or resting period.   6/6/2023 1032 by Temitope Matamoros RN  Outcome: Completed  6/6/2023 0949 by Temitope Matamoros RN  Outcome: Progressing     Problem: Safety - Adult  Goal: Free from fall injury  6/6/2023 1032 by Temitope Matamoros RN  Outcome: Completed  6/6/2023 0949 by Temitope Matamoros RN  Outcome: Progressing  6/5/2023 2114 by Jose Tapia RN  Outcome: Progressing     Problem: ABCDS Injury Assessment  Goal: Absence of physical injury  6/6/2023 1032 by Temitope Matamoros RN  Outcome: Completed  6/6/2023 0949 by Temitope Matamoros RN  Outcome: Progressing  6/5/2023 2114 by Jose Tapia RN  Outcome: Progressing     Problem: Nutrition Deficit:  Goal: Optimize nutritional status  6/6/2023 1032 by Temitope Matamoros RN  Outcome: Completed  6/6/2023 0949 by Temitope Matamoros RN  Outcome: Progressing  6/5/2023 2114 by Jose Tapia RN  Outcome: Progressing

## 2023-06-07 ENCOUNTER — TELEPHONE (OUTPATIENT)
Dept: SURGERY | Age: 63
End: 2023-06-07

## 2023-06-07 NOTE — TELEPHONE ENCOUNTER
Pt private nurse called stating incision is draining a bloody clear waterish color fluid, saturating 6 layers of gauze in less than an hour. Home Care is coming in this evening. G-tube is seeping a dark foul drainage as well, states it was going on while she was at the hospital as well. Abdomen is distended more than it was when she left the hospital. She had a BM this morning. Potassium and Lasix that family doctor put her on, wanting to know if Dr Riddhi Barnes wants another way for her to consume potassium in another form since when she had a BM this morning the nurse found 2 potassium pills in stool. She last took the potassium pills a week ago and they were in her stool this morning. Lasix 20mg, she takes half a tablet a day, is Dr. Riddhi Barnes ok with her going back to taking half a tablet?     [phone]  Liz

## 2023-06-08 NOTE — PROGRESS NOTES
/70   Pulse 88   Temp 97 °F (36.1 °C) (Oral)   Resp 16   Ht 5' 3\" (1.6 m)   Wt 88 lb 9.6 oz (40.2 kg)   SpO2 97%   BMI 15.69 kg/m²     Assessment complete. Meds passed. Pt denies needs at this time. PRN Roxicodone provided. MS contin provided after it was verified. Patient understanding of d/c'd IV pain meds. Pain voided in bsc with RN assist. PT seeing patient. Patient states she has chronic pain, and her acute pain is r/t her staples poking her        Bedside Mobility Assessment Tool (BMAT):     Assessment Level 1- Sit and Shake    1. From a semi-reclined position, ask patient to sit up and rotate to a seated position at the side of the bed. Can use the bedrail. 2. Ask patient to reach out and grab your hand and shake making sure patient reaches across his/her midline. Pass- Patient is able to come to a seated position, maintain core strength. Maintains seated balance while reaching across midline. Move on to Assessment Level 2. Assessment Level 2- Stretch and Point   1. With patient in seated position at the side of the bed, have patient place both feet on the floor (or stool) with knees no higher than hips. 2. Ask patient to stretch one leg and straighten the knee, then bend the ankle/flex and point the toes. If appropriate, repeat with the other leg. Pass- Patient is able to demonstrate appropriate quad strength on intended weight bearing limb(s). Move onto Assessment Level 3. Assessment Level 3- Stand   1. Ask patient to elevate off the bed or chair (seated to standing) using an assistive device (cane, bedrail). 2. Patient should be able to raise buttocks off be and hold for a count of five. May repeat once. Pass- Patient maintains standing stability for at least 5 seconds, proceed to assessment level 4. Assessment Level 4- Walk   1. Ask patient to march in place at bedside. 2. Then ask patient to advance step and return each foot.  Some medical conditions may render a
06/06/23 1138   Encounter Summary   Encounter Overview/Reason  Spiritual/Emotional Needs   Service Provided For: Patient   Referral/Consult From: [address] Family members   Last Encounter  06/06/23  (emotoinal support, nurtured hope, blessing)   Complexity of Encounter Low   Begin Time 1015   End Time  1030   Total Time Calculated 15 min   Spiritual/Emotional needs   Type Spiritual Support   Assessment/Intervention/Outcome   Assessment Calm; Hopeful   Intervention Active listening;Nurtured Hope   Outcome Comfort;Encouraged;Expressed Gratitude
AM labs drawn, Toradol given and preventative mepilex replaced on bilateral heels per pt request. Suggested elevating legs/ankles on pillows pt refused at this time
Bedside report given to [facility] pt in stable condition no needs at this time.  Pts J tube unclamped at this time Call light within reach
Bedside report given to [facility] RN  pt in stable condition no needs at this time.  Call light within reach
General Surgery  Daily Progress Note    Pt Name: Ana Mcgrath  Medical Record Number: [id number]  Date of Birth 1960   Today's Date: 6/5/2023    SUBJECTIVE  Continues to improve. + gas or BM yet. Tolerating intermittent clamping trial and clear liquids. OBJECTIVE  Vitals:    06/04/23 1948 06/04/23 2352 06/05/23 0348 06/05/23 0717   BP:  (!) 109/94 119/71    Pulse:  (!) 105 92 77   Resp:  16 16 18   Temp:  98.1 °F (36.7 °C) 98.8 °F (37.1 °C)    TempSrc:  Oral Oral    SpO2: 97% 93% 98%    Weight:       Height:           Gen: No distress. Alert. Chronically ill appearing. Frail appearance. Resp: No accessory muscle use. No crackles. No wheezes. No rhonchi. CV: Regular rate. Regular rhythm. No murmur. No rub. No edema. 1+ BLE pitting edema. GI: +Diffuse TTP improving. Distension improved. No masses. No organomegaly. +bowel sounds. No hernia. Left sided PEG tube in place  Skin: Warm and dry. No nodule on exposed extremities. No rash on exposed extremities. I/O last 3 completed shifts:  In: -   Out: 2950 [Urine:2900; Emesis/NG output:50]  No intake/output data recorded. LABS  CBC:   Recent Labs     06/04/23  0550   WBC 12.5*   HGB 12.1   HCT 36.4   MCV 93.8        BMP:   Recent Labs     06/03/23  0610 06/04/23  0550 06/05/23  0510    135* 131*   K 3.3* 3.9 4.0    103 98*   CO2 27 26 27   BUN 4* 4* 4*   CREATININE <0.5* <0.5* <0.5*     LIVER PROFILE: No results for input(s): AST, ALT, LIPASE, BILIDIR, BILITOT, ALKPHOS in the last 72 hours. Invalid input(s): AMYLASE,  ALB  PT/INR: No results for input(s): PROTIME, INR in the last 72 hours. APTT: No results for input(s): APTT in the last 72 hours. UA:No results for input(s): NITRITE, COLORU, PHUR, LABCAST, WBCUA, RBCUA, MUCUS, TRICHOMONAS, YEAST, BACTERIA, CLARITYU, SPECGRAV, LEUKOCYTESUR, UROBILINOGEN, BILIRUBINUR, BLOODU, GLUCOSEU, AMORPHOUS in the last 72 hours.     Invalid input(s): KETONESU      IMAGING  FL LESS THAN 1
J tube clamped at this time. Pt denies any needs.  Call light within reach
J tube unclamped at this time.  Scheduled Toradol given
Patient blood sugar initially low, RN gave the patient orange juice with some additional sugar. Patient sugar at recheck was 53. Patient alert and oriented, asymptomatic with fairly cold hands. Heat pack applied to right hand, and orange juice was consumed. At  [phone], that patient was only 60 on her blood glucose. RN started a d10 bolus to instill 125cc of fluid to raise sugar.  Will recheck at [phone]
Physician Progress Note      PATIENT:               Chance Salazar  CSN #:                  [id number]  :                       1960  ADMIT DATE:       2023 2:33 PM  [address] [facility] DATE:  RESPONDING  PROVIDER #:        Herbert Roger          QUERY TEXT:    Patient admitted with colon cancer. Noted to have BMI 15.6. If possible,   please document in progress notes and discharge summary if you are evaluating   and /or treating any of the following: The medical record reflects the following:  Risk Factors: BMI 15.6, colon cancer  Clinical Indicators: dietitian note- BMI Categories: Underweight (BMI less   than 18.5)  Treatment: dietitian consult, h distal small bowel bypass with stapled   enteroenterostomy    Thank You Anita Ness RN, ROSA [email or web address]. [employer]    ASPEN Criteria:    https://aspenjournals. onlinelibrary. smith. com/doi/full/10.1177/333567218003810  5  Options provided:  -- Underweight with BMI 15.6  -- Other - I will add my own diagnosis  -- Disagree - Not applicable / Not valid  -- Disagree - Clinically unable to determine / Unknown  -- Refer to Clinical Documentation Reviewer    PROVIDER RESPONSE TEXT:    This patient is underweight with a BMI of 15.6.     Query created by: Zander Armendariz on 2023 3:27 PM      Electronically signed by:  Herbert Roger 2023 7:31 AM
Pt awake at this time, J tube unclamped at this time. PRN Morphine given.
Pt awake at this time. J tube clamped at this time. PRN morphine given .
Pt given written and verbal discharge instructions with prescriptions. Pt indicated understanding and received prescription and home medication instructions. Pt did have slight bloody drainage from abdominal incision when she put her jeans on. Pressure held and gauze applied. Sent additional gauze with patientPt packed own belongings. Pt taken down to family car via wheelchair.
Pt sat on side of bed brushed teeth and washed hair and face. J tube dressing changed at this time. Odor noted.
backwards, use your best clinical judgement. Fail- Patient not able to complete tasks OR requires use of assistive device. Patient is MOBILITY LEVEL 3.        Mobility Level- 3
conservation, pacing activity, and calling for assist with mobility. Assessment  Pt tolerating functional activity progressively better. Able to complete bed mobility modified IND, transfers and 50 ft ambulation with walker at supervision level. Cynthia is slow and posture is moderately flexed as a function of abd pain. Discussed techniques for gradual transition back to fully upright posture over time and pt was receptive. She will have PRN assist from independent home RN upon return home. She has good awareness of her limitations. Goals : To be met in 3 visits:  1). Independent with LE Ex x 10 reps  2). Sit to/from stand: Independent  3). Bed to chair: Independent      To be met in 6 visits:  1). Gait: Ambulate 150 ft.  with Independent and use of LRAD (least restrictive assistive device)  2). Tolerate B LE exercises 3 sets of 10-15 reps  3). Ascend/descend 1 + 1 steps with Supervision with use of no handrail and LRAD (least restrictive assistive device)    Rehabilitation Potential: Good  Strengths for achieving goals include:   Pt motivated, PLOF, Family Support, and Pt cooperative   Barriers to achieving goals include:    Complexity of condition, Chronicity of condition, Pain, and Weakness    Plan    To be seen 2-3 x / week  while in acute care setting for therapeutic exercises, bed mobility, transfers, progressive gait training, balance training, and family/patient education. Signature: Kameron Falk, PT , DPT    If patient discharges from this facility prior to next visit, this note will serve as the Discharge Summary.

## 2023-06-08 NOTE — TELEPHONE ENCOUNTER
I spoke with Paco Alfred. Per Paco Alfred, he was not aware of any drainage when she was d/c'd from hospital, and she can f/up with Dr. Kiara Sequeira next week. If drainage improves, then she can return for her regularly planned 2 week hospital f/up. If drainage worsened, or signs of infection, she needs to return to hospital. With Lasix, it is fine to go back on this as directed by the doctor who prescribed this for her. With Potassium, she can discuss with the prescribing doctor also, and they might switch her to a dissolvable tablet. I called and left message for Liz to return my call.

## 2023-06-09 NOTE — TELEPHONE ENCOUNTER
I called Erin Meredith and informed her of info. She states she will see patient tonight and inform her.

## 2023-06-19 ENCOUNTER — TELEPHONE (OUTPATIENT)
Dept: SURGERY | Age: 63
End: 2023-06-19

## 2023-06-19 NOTE — TELEPHONE ENCOUNTER
Liz richardson RN called stating patient needs an prescription for niastatin swish and swallow for thrush caused by abx.     [address]

## 2023-06-20 ENCOUNTER — TELEPHONE (OUTPATIENT)
Dept: FAMILY MEDICINE CLINIC | Age: 63
End: 2023-06-20

## 2023-06-20 NOTE — TELEPHONE ENCOUNTER
Luciana Many called and was wanting to know if she is to resume the lasix and potassium and if so she will need a potassium  in liquid form or casual.

## 2023-06-21 NOTE — TELEPHONE ENCOUNTER
Based on the patient's recent blood pressure of 90 systolic I do not think I would add back Lasix at this time. We can periodically monitor her for her routine visits. We could at some point resume medication but I think based information available and given the fact have not seen the patient in person physically since February I would continue to monitor.

## 2023-07-07 ENCOUNTER — OFFICE VISIT (OUTPATIENT)
Dept: FAMILY MEDICINE CLINIC | Age: 63
End: 2023-07-07
Payer: COMMERCIAL

## 2023-07-07 VITALS
OXYGEN SATURATION: 93 % | HEART RATE: 64 BPM | BODY MASS INDEX: 16.47 KG/M2 | DIASTOLIC BLOOD PRESSURE: 64 MMHG | WEIGHT: 93 LBS | SYSTOLIC BLOOD PRESSURE: 112 MMHG

## 2023-07-07 DIAGNOSIS — R53.1 GENERAL WEAKNESS: ICD-10-CM

## 2023-07-07 DIAGNOSIS — C18.9 MALIGNANT NEOPLASM OF COLON, UNSPECIFIED PART OF COLON (HCC): Primary | ICD-10-CM

## 2023-07-07 PROCEDURE — 3078F DIAST BP <80 MM HG: CPT

## 2023-07-07 PROCEDURE — 99215 OFFICE O/P EST HI 40 MIN: CPT

## 2023-07-07 PROCEDURE — 3074F SYST BP LT 130 MM HG: CPT

## 2023-07-07 RX ORDER — ACETAMINOPHEN 500 MG
500 TABLET ORAL EVERY 6 HOURS PRN
COMMUNITY

## 2023-07-07 RX ORDER — GABAPENTIN 300 MG/1
300 CAPSULE ORAL 3 TIMES DAILY
Qty: 90 CAPSULE | Refills: 0 | Status: SHIPPED | OUTPATIENT
Start: 2023-07-07 | End: 2023-08-06

## 2023-07-07 RX ORDER — DOCUSATE SODIUM 100 MG/1
100 CAPSULE, LIQUID FILLED ORAL 3 TIMES DAILY PRN
COMMUNITY

## 2023-07-07 RX ORDER — IBUPROFEN 200 MG
200 TABLET ORAL EVERY 6 HOURS PRN
COMMUNITY

## 2023-07-07 ASSESSMENT — ENCOUNTER SYMPTOMS
RESPIRATORY NEGATIVE: 1
BACK PAIN: 1
ABDOMINAL PAIN: 1

## 2023-07-07 NOTE — PROGRESS NOTES
Chief Complaint   Patient presents with    Extremity Weakness       HPI:  Binh Martin is a 57 y.o. (: 1960) here today   for extremity weakness in which she needs new handicap placard for. Not a new symptom. Also wants to discuss treatment options for pain management now that she is not going to continue with chemotherapy for her colon cancer. Started Chemo end of March and continued for roughly 2 months.  per pt states pt would not be a candidate for ongoing treatments based on her current condition and the surgery he performed for her conditions. Patient had a CT of the abdomen pelvis on 2023 which noted the colon cancer finding. Had a repeat CT of the abdomen pelvis with IV contrast only which noted some shrinking of the mass slightly from prior CT in April. Is on gabapentin 300 mg 3 times daily currently. Maritza Walker had pt on Morphine Sulf ER 30mg Tab BID. Was refilled by  due to pt not seeing oncology now since stopping Chemo. Was advised per pt by  to see PCP to order Palliative Care. Last filled Gabapentin on 23 for 30 days and Morphine on 23 for 30 days. Pt wanting to proceed with transition program with [facility] [address] See below op noted from  from procedure on 23. Procedure:  1. Laparotomy with distal small bowel bypass with stapled enteroenterostomy  2. Cystoscopy with right side ureteral stent exchange. Patient's medications, allergies, past medical, surgical, social and family histories were reviewed and updated asappropriate. ROS:  Review of Systems   Constitutional: Negative. HENT: Negative. Respiratory: Negative. Cardiovascular: Negative. Gastrointestinal:  Positive for abdominal pain. Musculoskeletal:  Positive for arthralgias, back pain and gait problem. Psychiatric/Behavioral: Negative. Prior to Visit Medications    Medication Sig Taking?  Authorizing Provider   acetaminophen

## 2023-07-11 RX ORDER — FLUTICASONE FUROATE, UMECLIDINIUM BROMIDE AND VILANTEROL TRIFENATATE 100; 62.5; 25 UG/1; UG/1; UG/1
1 POWDER RESPIRATORY (INHALATION) DAILY
Qty: 1 EACH | Refills: 3 | Status: SHIPPED | OUTPATIENT
Start: 2023-07-11

## 2023-07-11 RX ORDER — ESTRADIOL AND NORETHINDRONE ACETATE 1; .5 MG/1; MG/1
1 TABLET ORAL DAILY
Qty: 84 TABLET | Refills: 1 | Status: SHIPPED | OUTPATIENT
Start: 2023-07-11 | End: 2023-10-09

## 2023-07-11 RX ORDER — ERGOCALCIFEROL 1.25 MG/1
50000 CAPSULE ORAL WEEKLY
Qty: 12 CAPSULE | Refills: 1 | Status: SHIPPED | OUTPATIENT
Start: 2023-07-11

## 2023-08-16 ENCOUNTER — HOSPITAL ENCOUNTER (OUTPATIENT)
Age: 63
Setting detail: OUTPATIENT SURGERY
Discharge: HOME | End: 2023-08-16
Attending: INTERNAL MEDICINE | Admitting: INTERNAL MEDICINE
Payer: COMMERCIAL

## 2023-08-16 PROCEDURE — 3609038000 HC PEG TUBE REMOVAL: Performed by: INTERNAL MEDICINE

## 2023-08-16 PROCEDURE — 3600000033: Performed by: INTERNAL MEDICINE

## 2023-08-16 NOTE — PROGRESS NOTES
PEG tube removed per MD without complications. Patient denied any pain from procedure. Dry sterile dressing to site. Pt discharged to home in stable condition. Verbal and written discharge instructions given pt verbalized understanding. Pt to car per wheelchair.

## 2023-08-16 NOTE — H&P
History and Physical / Pre-Sedation Assessment    Patient:  Filippo Mcclellan   :   1960     Intended Procedure:  PEG removal    HPI: 78-year-old female with history of hypertension and complex history of metastatic colon cancer had PEG tube placement for nutritional support. Past Medical History:   Diagnosis Date    Hx of colectomy 2023    Hypertension     Malignant neoplasm of ascending colon [facility])      Past Surgical History:   Procedure Laterality Date    BLADDER SURGERY N/A 2023    CYSTOSCOPY STENT EXCHANGE RIGHT performed by Rita Vang MD at [facility] 5/10/2022    RIGHT COLECTOMY, PARTIAL DUODENECTOMY, SMALL BOWEL RESECTION, CHOLECYSTECTOMY, INSERTION OF FEEDING JEJUNOSTOMY TUBE performed by Kaylynn Vaz MD at [facility] 2023    LAPAROTOMY, distal small bowel bypasswith staples,enteroenterostomy performed by Kaylynn Vaz MD at [address] [place] [address] 3/1/2023    EGD/PEG W/ANES. (9:30) performed by Raphael Garcia MD at [address] [place] [address]       Medications reviewed  Prior to Admission medications    Medication Sig Start Date End Date Taking?  Authorizing Provider   vitamin D (ERGOCALCIFEROL) 1.25 MG (97829 UT) CAPS capsule Take 1 capsule by mouth once a week Once a week on 23   Mane Gamez MD   estradiol-norethindrone (ACTIVELLA) 1-0.5 MG per tablet Take 1 tablet by mouth daily 7/11/23 10/9/23  Mane Gamez MD   fluticasone-umeclidin-vilant (TRELEGY ELLIPTA) 373-63.8-89 MCG/ACT AEPB inhaler Inhale 1 puff into the lungs daily 23   Mane Gamez MD   acetaminophen (TYLENOL) 500 MG tablet Take 1 tablet by mouth every 6 hours as needed for Pain    Historical Provider, MD   ibuprofen (ADVIL;MOTRIN) 200 MG tablet Take 1 tablet by mouth every 6 hours as needed for Pain    Historical Provider, MD   docusate sodium (COLACE) 100 MG

## 2023-08-16 NOTE — DISCHARGE INSTRUCTIONS
Keep area clean and dry  Dry Sterile dressing to area for next several days  Follow up with Dr Leigh Course as needed

## 2023-08-16 NOTE — OP NOTE
Operative Note      Patient: Clarissa Dee  YOB: 1960  MRN: [id number]    Date of Procedure: 8/16/2023    Pre-Op Diagnosis Codes:     * PEG (percutaneous endoscopic gastrostomy) adjustment/replacement/removal ([address]) [Z43.1]    Post-Op Diagnosis:  PEG removal       Procedure(s):  REMOVAL PEG TUBE (11:30) POA Melissaudence Hao [phone]    Surgeon(s):  Carrie Ho MD    Assistant:   * No surgical staff found *    Anesthesia: None    Estimated Blood Loss (mL): Minimal    Complications: None    Specimens:   * No specimens in log *    Implants:  * No implants in log *      Drains:   Gastrostomy/Enterostomy/Jejunostomy Tube Gastrostomy LLQ 1 20 fr (Active)       Gastrostomy/Enterostomy/Jejunostomy Tube Gastrostomy LLQ (Active)       Findings:  gastrostomy malfunction    Detailed Description of Procedure:   Existing PEG tube appears to be malfuntioning. PEG site appears to be inflamed. PEG tube was removed using pull method successfully.  New dry dressing was applied    Recommendation  Keep gastrostomy site clean and dry  Consider wound care consult     Electronically signed by Carrie Ho MD on 8/16/2023 at 12:30 PM

## 2023-09-29 ENCOUNTER — TELEPHONE (OUTPATIENT)
Dept: PULMONOLOGY | Age: 63
End: 2023-09-29

## 2023-09-29 NOTE — TELEPHONE ENCOUNTER
Patient did not show for 6 month COPD appointment  with Dr. Kristian Cunningham on 9/29/23    Same Day Cancellation: No    Patient rescheduled:  No    New appointment: NA    Patient was also no show on: NA    LOV 3/31/23   IMPRESSION:    1-COPD/emphysema  2-chronic hypoxic resp failure   3-metastatic colon cancer                 PLAN:      -will start Trelegy 100  -PFT ,doubt she will be above   -CT shows emphysema ,next ct is on December   -will see in August   _ keep o2

## 2023-10-11 ENCOUNTER — COMMUNITY OUTREACH (OUTPATIENT)
Dept: FAMILY MEDICINE CLINIC | Age: 63
End: 2023-10-11

## 2023-10-11 NOTE — PROGRESS NOTES
Patient's HM shows they are overdue for [address] and  files searched  without success.

## 2024-04-29 ENCOUNTER — COMMUNITY OUTREACH (OUTPATIENT)
Dept: FAMILY MEDICINE CLINIC | Age: 64
End: 2024-04-29

## (undated) DEVICE — SHEATH INTRO 17GA L8IN TUNN DISP ON-Q

## (undated) DEVICE — RELOAD STPL L75MM OPN H3.8MM CLS 1.5MM WIRE DIA0.2MM REG

## (undated) DEVICE — STAPLER INT L60MM REG TISS BLU B FRM 8 FIRING 2 ROW AUTO

## (undated) DEVICE — SPONGE LAP W18XL18IN WHT COT 4 PLY FLD STRUNG RADPQ DISP ST

## (undated) DEVICE — SUTURE PROL SZ 1 L30IN NONABSORBABLE BLU CTX L48MM 1/2 CIR 8455H

## (undated) DEVICE — CIRCUIT ANES L72IN 3L BACT AND VIR FLTR EL CONN SGL LIMB

## (undated) DEVICE — CONMED ACCESSORY ELECTRODE, 6 INCH (15.24 CM) FLAT BLADE: Brand: CONMED

## (undated) DEVICE — Z INACTIVE USE 2855096 SPONGE GZ W4XL4IN 8 PLY 100% COT

## (undated) DEVICE — GOWN,AURORA,NONREINF,RAGLAN,XXL,STERILE: Brand: MEDLINE

## (undated) DEVICE — YANKAUER,BULB TIP,W/O VENT,RIGID,STERILE: Brand: MEDLINE

## (undated) DEVICE — ELECTRODE PT RET AD L9FT HI MOIST COND ADH HYDRGEL CORDED

## (undated) DEVICE — SOLUTION IV IRRIG 500ML 0.9% SODIUM CHL 2F7123

## (undated) DEVICE — APPLICATOR MEDICATED 26 CC SOLUTION HI LT ORNG CHLORAPREP

## (undated) DEVICE — MAJOR SET UP PK

## (undated) DEVICE — CANNULA NSL 13FT TUBE AD ETCO2 DIV SAMP M

## (undated) DEVICE — CONMED SCOPE SAVER BITE BLOCK, 20X27 MM: Brand: SCOPE SAVER

## (undated) DEVICE — GLOVE ORANGE PI 8 1/2   MSG9085

## (undated) DEVICE — SEALER ENDOSCP NANO COAT OPN DIV CRV L JAW LIGASURE IMPACT

## (undated) DEVICE — GOWN SIRUS NONREIN XL W/TWL: Brand: MEDLINE INDUSTRIES, INC.

## (undated) DEVICE — STAPLER SKIN H3.9MM WIRE DIA0.58MM CRWN 6.9MM 35 STPL FIX

## (undated) DEVICE — SPONGE LAP W18XL18IN WHT COT 4 PLY FLD STRUNG RADPQ DISP ST 2 PER PACK

## (undated) DEVICE — SUTURE PERMA-HAND SZ 2-0 L30IN NONABSORBABLE BLK L26MM SH K833H

## (undated) DEVICE — GUIDEWIRE VASC NITINOL HYDROPHIL STR 3 CM 0.035 INX150 CM STD NIT ZIPWIRE

## (undated) DEVICE — STAPLER INT CUT LN 51MM STPL 51MM BLU CRV HD B FRM

## (undated) DEVICE — SYRINGE MED 10ML LUERLOCK TIP W/O SFTY DISP

## (undated) DEVICE — STAPLER INT L75MM CUT LN L73MM STPL LN L77MM BLU B FRM 8

## (undated) DEVICE — VERTICAL TUBE ATTACHMENT DEVICE - ACCOMODATES 5FR TO 40FR TUBES: Brand: HOLLISTER

## (undated) DEVICE — KIT INFUS PMP 270ML 4ML/HR 2ML/SITE SOAK CATH L5IN N NARC

## (undated) DEVICE — ECHELON CONTOUR GST RELOAD GREEN: Brand: ECHELON

## (undated) DEVICE — SUTURE PERMAHAND SZ 2-0 L30IN NONABSORBABLE BLK SILK W/O A305H

## (undated) DEVICE — BASIC CYSTO I-LF: Brand: MEDLINE INDUSTRIES, INC.

## (undated) DEVICE — SUTURE PERMAHAND SZ 3-0 L18IN NONABSORBABLE BLK L26MM SH C013D

## (undated) DEVICE — RELOAD STPL H1.5X3.6XL60MM REG TISS BLU B FRM AUTO RET PIN

## (undated) DEVICE — GOWN SIRUS NONREIN LG W/TWL: Brand: MEDLINE INDUSTRIES, INC.

## (undated) DEVICE — TRAY CATH 16FR F INCLUDE LUB DRNGE BG STATLOK STBL DEV

## (undated) DEVICE — GAUZE,SPONGE,4"X4",8PLY,STRL,LF,10/TRAY: Brand: MEDLINE

## (undated) DEVICE — AGENT HEMSTAT W2XL4IN OXIDIZED REGENERATED CELOS ABSRB

## (undated) DEVICE — Device

## (undated) DEVICE — ENDO CARRY-ON PROCEDURE KIT INCLUDES SUCTION TUBING, LUBRICANT, GAUZE, BIOHAZARD STICKER, TRANSPORT PAD AND INTERCEPT BEDSIDE KIT.: Brand: ENDO CARRY-ON PROCEDURE KIT

## (undated) DEVICE — BAG URIN STRL FOR URO CTCH SYS

## (undated) DEVICE — TUBING, SUCTION, 3/16" X 10', STRAIGHT: Brand: MEDLINE

## (undated) DEVICE — INVIEW CLEAR LEGGINGS: Brand: CONVERTORS

## (undated) DEVICE — KIT EVAC 100CC W10MMXL20CM SIL FULL PERF HUBLESS FLAT DRN

## (undated) DEVICE — ELECTRODE ECG MONITR FOAM TEAR DROP ADLT RED

## (undated) DEVICE — PREP SOL PVP IODINE 4%  4 OZ/BTL

## (undated) DEVICE — GLOVE ORANGE PI 8   MSG9080

## (undated) DEVICE — SOLUTION IRRIGATION STRL H2O 1000 ML UROMATIC CONTAINER

## (undated) DEVICE — BOWL MED L 32OZ PLAS W/ MOLD GRAD EZ OPN PEEL PCH

## (undated) DEVICE — PACK,UNIVERSAL,SPLIT,II,AURORA: Brand: MEDLINE

## (undated) DEVICE — PLUG,CATHETER,DRAINAGE PROTECTOR,TUBE: Brand: MEDLINE

## (undated) DEVICE — STRL PENROSE DRAIN 18" X 1/4": Brand: CARDINAL HEALTH

## (undated) DEVICE — CATHETER,URETHRAL,REDRUBBER,STRL,14FR: Brand: MEDLINE INDUSTRIES, INC.